# Patient Record
Sex: MALE | Race: WHITE | NOT HISPANIC OR LATINO | Employment: OTHER | URBAN - METROPOLITAN AREA
[De-identification: names, ages, dates, MRNs, and addresses within clinical notes are randomized per-mention and may not be internally consistent; named-entity substitution may affect disease eponyms.]

---

## 2021-08-03 ENCOUNTER — HOSPITAL ENCOUNTER (INPATIENT)
Facility: CLINIC | Age: 71
LOS: 1 days | Discharge: ANOTHER HOSPITAL | DRG: 540 | End: 2021-08-05
Attending: EMERGENCY MEDICINE | Admitting: INTERNAL MEDICINE
Payer: MEDICARE

## 2021-08-03 ENCOUNTER — APPOINTMENT (OUTPATIENT)
Dept: GENERAL RADIOLOGY | Facility: CLINIC | Age: 71
DRG: 540 | End: 2021-08-03
Attending: EMERGENCY MEDICINE
Payer: MEDICARE

## 2021-08-03 ENCOUNTER — OFFICE VISIT (OUTPATIENT)
Dept: URGENT CARE | Facility: URGENT CARE | Age: 71
End: 2021-08-03
Payer: MEDICARE

## 2021-08-03 VITALS — SYSTOLIC BLOOD PRESSURE: 131 MMHG | DIASTOLIC BLOOD PRESSURE: 71 MMHG | TEMPERATURE: 99.4 F | HEART RATE: 81 BPM

## 2021-08-03 DIAGNOSIS — L03.116 CELLULITIS OF LEFT LOWER EXTREMITY: ICD-10-CM

## 2021-08-03 DIAGNOSIS — L03.116 CELLULITIS OF LEFT ANKLE: ICD-10-CM

## 2021-08-03 DIAGNOSIS — E11.69 TYPE 2 DIABETES MELLITUS WITH OTHER SPECIFIED COMPLICATION, WITHOUT LONG-TERM CURRENT USE OF INSULIN (H): ICD-10-CM

## 2021-08-03 DIAGNOSIS — M79.89 LEFT LEG SWELLING: Primary | ICD-10-CM

## 2021-08-03 DIAGNOSIS — Z87.821 HISTORY OF FOREIGN BODY IN EYE: ICD-10-CM

## 2021-08-03 LAB
ANION GAP SERPL CALCULATED.3IONS-SCNC: 7 MMOL/L (ref 3–14)
BASOPHILS # BLD AUTO: 0 10E3/UL (ref 0–0.2)
BASOPHILS NFR BLD AUTO: 0 %
BUN SERPL-MCNC: 27 MG/DL (ref 7–30)
CALCIUM SERPL-MCNC: 8.8 MG/DL (ref 8.5–10.1)
CHLORIDE BLD-SCNC: 104 MMOL/L (ref 94–109)
CO2 SERPL-SCNC: 25 MMOL/L (ref 20–32)
CREAT SERPL-MCNC: 1.16 MG/DL (ref 0.66–1.25)
EOSINOPHIL # BLD AUTO: 0.1 10E3/UL (ref 0–0.7)
EOSINOPHIL NFR BLD AUTO: 1 %
ERYTHROCYTE [DISTWIDTH] IN BLOOD BY AUTOMATED COUNT: 13.4 % (ref 10–15)
GFR SERPL CREATININE-BSD FRML MDRD: 63 ML/MIN/1.73M2
GLUCOSE BLD-MCNC: 182 MG/DL (ref 70–99)
HCT VFR BLD AUTO: 39.9 % (ref 40–53)
HGB BLD-MCNC: 13.1 G/DL (ref 13.3–17.7)
HOLD SPECIMEN: NORMAL
IMM GRANULOCYTES # BLD: 0 10E3/UL
IMM GRANULOCYTES NFR BLD: 0 %
LACTATE SERPL-SCNC: 1 MMOL/L (ref 0.7–2)
LYMPHOCYTES # BLD AUTO: 1.7 10E3/UL (ref 0.8–5.3)
LYMPHOCYTES NFR BLD AUTO: 13 %
MCH RBC QN AUTO: 30.1 PG (ref 26.5–33)
MCHC RBC AUTO-ENTMCNC: 32.8 G/DL (ref 31.5–36.5)
MCV RBC AUTO: 92 FL (ref 78–100)
MONOCYTES # BLD AUTO: 0.8 10E3/UL (ref 0–1.3)
MONOCYTES NFR BLD AUTO: 7 %
NEUTROPHILS # BLD AUTO: 10.2 10E3/UL (ref 1.6–8.3)
NEUTROPHILS NFR BLD AUTO: 79 %
NRBC # BLD AUTO: 0 10E3/UL
NRBC BLD AUTO-RTO: 0 /100
PLATELET # BLD AUTO: 209 10E3/UL (ref 150–450)
POTASSIUM BLD-SCNC: 3.9 MMOL/L (ref 3.4–5.3)
RBC # BLD AUTO: 4.35 10E6/UL (ref 4.4–5.9)
SODIUM SERPL-SCNC: 136 MMOL/L (ref 133–144)
WBC # BLD AUTO: 12.9 10E3/UL (ref 4–11)

## 2021-08-03 PROCEDURE — 99285 EMERGENCY DEPT VISIT HI MDM: CPT

## 2021-08-03 PROCEDURE — 250N000011 HC RX IP 250 OP 636: Performed by: EMERGENCY MEDICINE

## 2021-08-03 PROCEDURE — 73610 X-RAY EXAM OF ANKLE: CPT | Mod: LT

## 2021-08-03 PROCEDURE — 80048 BASIC METABOLIC PNL TOTAL CA: CPT | Performed by: EMERGENCY MEDICINE

## 2021-08-03 PROCEDURE — 36415 COLL VENOUS BLD VENIPUNCTURE: CPT | Performed by: EMERGENCY MEDICINE

## 2021-08-03 PROCEDURE — C9803 HOPD COVID-19 SPEC COLLECT: HCPCS

## 2021-08-03 PROCEDURE — 99223 1ST HOSP IP/OBS HIGH 75: CPT | Mod: AI | Performed by: INTERNAL MEDICINE

## 2021-08-03 PROCEDURE — 87040 BLOOD CULTURE FOR BACTERIA: CPT | Performed by: EMERGENCY MEDICINE

## 2021-08-03 PROCEDURE — 99204 OFFICE O/P NEW MOD 45 MIN: CPT | Performed by: FAMILY MEDICINE

## 2021-08-03 PROCEDURE — 85025 COMPLETE CBC W/AUTO DIFF WBC: CPT | Performed by: EMERGENCY MEDICINE

## 2021-08-03 PROCEDURE — 83605 ASSAY OF LACTIC ACID: CPT | Performed by: EMERGENCY MEDICINE

## 2021-08-03 PROCEDURE — 96365 THER/PROPH/DIAG IV INF INIT: CPT

## 2021-08-03 PROCEDURE — 87635 SARS-COV-2 COVID-19 AMP PRB: CPT | Performed by: EMERGENCY MEDICINE

## 2021-08-03 RX ADMIN — TAZOBACTAM SODIUM AND PIPERACILLIN SODIUM 4.5 G: 500; 4 INJECTION, SOLUTION INTRAVENOUS at 23:38

## 2021-08-03 ASSESSMENT — ENCOUNTER SYMPTOMS
COLOR CHANGE: 1
WOUND: 1
FEVER: 0

## 2021-08-03 ASSESSMENT — MIFFLIN-ST. JEOR: SCORE: 1754.38

## 2021-08-03 NOTE — ED TRIAGE NOTES
Presents with LLE wounds of various stages of healing, redness and swelling. Pt has been remodeling a house and has several new wounds from the project as well as several diabetic ulcers to LLE. Pt on metformin. LLE is malodorous. VSS on RA.

## 2021-08-04 ENCOUNTER — APPOINTMENT (OUTPATIENT)
Dept: ULTRASOUND IMAGING | Facility: CLINIC | Age: 71
DRG: 540 | End: 2021-08-04
Attending: PODIATRIST
Payer: MEDICARE

## 2021-08-04 ENCOUNTER — APPOINTMENT (OUTPATIENT)
Dept: GENERAL RADIOLOGY | Facility: CLINIC | Age: 71
DRG: 540 | End: 2021-08-04
Attending: PODIATRIST
Payer: MEDICARE

## 2021-08-04 ENCOUNTER — APPOINTMENT (OUTPATIENT)
Dept: MRI IMAGING | Facility: CLINIC | Age: 71
DRG: 540 | End: 2021-08-04
Attending: PODIATRIST
Payer: MEDICARE

## 2021-08-04 PROBLEM — L03.116 CELLULITIS OF LEFT ANKLE: Status: ACTIVE | Noted: 2021-08-04

## 2021-08-04 LAB
CRP SERPL-MCNC: 122 MG/L (ref 0–8)
ERYTHROCYTE [SEDIMENTATION RATE] IN BLOOD BY WESTERGREN METHOD: 14 MM/HR (ref 0–20)
GLUCOSE BLDC GLUCOMTR-MCNC: 163 MG/DL (ref 70–99)
GLUCOSE BLDC GLUCOMTR-MCNC: 175 MG/DL (ref 70–99)
GLUCOSE BLDC GLUCOMTR-MCNC: 188 MG/DL (ref 70–99)
GLUCOSE BLDC GLUCOMTR-MCNC: 237 MG/DL (ref 70–99)
HBA1C MFR BLD: 7.6 % (ref 0–5.6)
SARS-COV-2 RNA RESP QL NAA+PROBE: NEGATIVE

## 2021-08-04 PROCEDURE — 250N000011 HC RX IP 250 OP 636: Performed by: INTERNAL MEDICINE

## 2021-08-04 PROCEDURE — 36415 COLL VENOUS BLD VENIPUNCTURE: CPT | Performed by: PODIATRIST

## 2021-08-04 PROCEDURE — 255N000002 HC RX 255 OP 636: Performed by: INTERNAL MEDICINE

## 2021-08-04 PROCEDURE — 83036 HEMOGLOBIN GLYCOSYLATED A1C: CPT | Performed by: INTERNAL MEDICINE

## 2021-08-04 PROCEDURE — 93922 UPR/L XTREMITY ART 2 LEVELS: CPT

## 2021-08-04 PROCEDURE — 99232 SBSQ HOSP IP/OBS MODERATE 35: CPT | Performed by: PHYSICIAN ASSISTANT

## 2021-08-04 PROCEDURE — 36415 COLL VENOUS BLD VENIPUNCTURE: CPT | Performed by: INTERNAL MEDICINE

## 2021-08-04 PROCEDURE — 86140 C-REACTIVE PROTEIN: CPT | Performed by: PODIATRIST

## 2021-08-04 PROCEDURE — G1004 CDSM NDSC: HCPCS | Performed by: RADIOLOGY

## 2021-08-04 PROCEDURE — 258N000003 HC RX IP 258 OP 636: Performed by: INTERNAL MEDICINE

## 2021-08-04 PROCEDURE — A9585 GADOBUTROL INJECTION: HCPCS | Performed by: INTERNAL MEDICINE

## 2021-08-04 PROCEDURE — 120N000004 HC R&B MS OVERFLOW

## 2021-08-04 PROCEDURE — 73720 MRI LWR EXTREMITY W/O&W/DYE: CPT | Mod: 26 | Performed by: RADIOLOGY

## 2021-08-04 PROCEDURE — 73720 MRI LWR EXTREMITY W/O&W/DYE: CPT | Mod: LT

## 2021-08-04 PROCEDURE — 70030 X-RAY EYE FOR FOREIGN BODY: CPT

## 2021-08-04 PROCEDURE — 73723 MRI JOINT LWR EXTR W/O&W/DYE: CPT | Mod: LT

## 2021-08-04 PROCEDURE — 250N000012 HC RX MED GY IP 250 OP 636 PS 637: Performed by: INTERNAL MEDICINE

## 2021-08-04 PROCEDURE — 99222 1ST HOSP IP/OBS MODERATE 55: CPT | Performed by: PODIATRIST

## 2021-08-04 PROCEDURE — 73723 MRI JOINT LWR EXTR W/O&W/DYE: CPT | Mod: 26 | Performed by: RADIOLOGY

## 2021-08-04 PROCEDURE — 85652 RBC SED RATE AUTOMATED: CPT | Performed by: PODIATRIST

## 2021-08-04 RX ORDER — DEXTROSE MONOHYDRATE 25 G/50ML
25-50 INJECTION, SOLUTION INTRAVENOUS
Status: DISCONTINUED | OUTPATIENT
Start: 2021-08-04 | End: 2021-08-05 | Stop reason: HOSPADM

## 2021-08-04 RX ORDER — DEXTROSE MONOHYDRATE 25 G/50ML
25-50 INJECTION, SOLUTION INTRAVENOUS
Status: DISCONTINUED | OUTPATIENT
Start: 2021-08-04 | End: 2021-08-05

## 2021-08-04 RX ORDER — LIDOCAINE 40 MG/G
CREAM TOPICAL
Status: DISCONTINUED | OUTPATIENT
Start: 2021-08-04 | End: 2021-08-05 | Stop reason: HOSPADM

## 2021-08-04 RX ORDER — CEFAZOLIN SODIUM 1 G/50ML
1750 SOLUTION INTRAVENOUS ONCE
Status: COMPLETED | OUTPATIENT
Start: 2021-08-04 | End: 2021-08-04

## 2021-08-04 RX ORDER — ACETAMINOPHEN 650 MG/1
650 SUPPOSITORY RECTAL EVERY 6 HOURS PRN
Status: DISCONTINUED | OUTPATIENT
Start: 2021-08-04 | End: 2021-08-05 | Stop reason: HOSPADM

## 2021-08-04 RX ORDER — SODIUM CHLORIDE 9 MG/ML
INJECTION, SOLUTION INTRAVENOUS CONTINUOUS
Status: DISCONTINUED | OUTPATIENT
Start: 2021-08-04 | End: 2021-08-05

## 2021-08-04 RX ORDER — ACETAMINOPHEN 325 MG/1
650 TABLET ORAL EVERY 6 HOURS PRN
Status: DISCONTINUED | OUTPATIENT
Start: 2021-08-04 | End: 2021-08-05 | Stop reason: HOSPADM

## 2021-08-04 RX ORDER — HYDROMORPHONE HCL IN WATER/PF 6 MG/30 ML
0.2 PATIENT CONTROLLED ANALGESIA SYRINGE INTRAVENOUS
Status: DISCONTINUED | OUTPATIENT
Start: 2021-08-04 | End: 2021-08-05 | Stop reason: HOSPADM

## 2021-08-04 RX ORDER — NALOXONE HYDROCHLORIDE 0.4 MG/ML
0.2 INJECTION, SOLUTION INTRAMUSCULAR; INTRAVENOUS; SUBCUTANEOUS
Status: DISCONTINUED | OUTPATIENT
Start: 2021-08-04 | End: 2021-08-05 | Stop reason: HOSPADM

## 2021-08-04 RX ORDER — NICOTINE POLACRILEX 4 MG
15-30 LOZENGE BUCCAL
Status: DISCONTINUED | OUTPATIENT
Start: 2021-08-04 | End: 2021-08-05

## 2021-08-04 RX ORDER — NALOXONE HYDROCHLORIDE 0.4 MG/ML
0.4 INJECTION, SOLUTION INTRAMUSCULAR; INTRAVENOUS; SUBCUTANEOUS
Status: DISCONTINUED | OUTPATIENT
Start: 2021-08-04 | End: 2021-08-05 | Stop reason: HOSPADM

## 2021-08-04 RX ORDER — CEFAZOLIN SODIUM 1 G/50ML
1750 SOLUTION INTRAVENOUS EVERY 24 HOURS
Status: DISCONTINUED | OUTPATIENT
Start: 2021-08-04 | End: 2021-08-05 | Stop reason: HOSPADM

## 2021-08-04 RX ORDER — OXYCODONE HYDROCHLORIDE 5 MG/1
5 TABLET ORAL EVERY 4 HOURS PRN
Status: DISCONTINUED | OUTPATIENT
Start: 2021-08-04 | End: 2021-08-05 | Stop reason: HOSPADM

## 2021-08-04 RX ORDER — NICOTINE POLACRILEX 4 MG
15-30 LOZENGE BUCCAL
Status: DISCONTINUED | OUTPATIENT
Start: 2021-08-04 | End: 2021-08-05 | Stop reason: HOSPADM

## 2021-08-04 RX ORDER — GADOBUTROL 604.72 MG/ML
10 INJECTION INTRAVENOUS ONCE
Status: COMPLETED | OUTPATIENT
Start: 2021-08-04 | End: 2021-08-04

## 2021-08-04 RX ADMIN — VANCOMYCIN HYDROCHLORIDE 1750 MG: 10 INJECTION, POWDER, LYOPHILIZED, FOR SOLUTION INTRAVENOUS at 03:23

## 2021-08-04 RX ADMIN — TAZOBACTAM SODIUM AND PIPERACILLIN SODIUM 4.5 G: 500; 4 INJECTION, SOLUTION INTRAVENOUS at 18:45

## 2021-08-04 RX ADMIN — VANCOMYCIN HYDROCHLORIDE 1750 MG: 1 INJECTION, POWDER, LYOPHILIZED, FOR SOLUTION INTRAVENOUS at 22:01

## 2021-08-04 RX ADMIN — TAZOBACTAM SODIUM AND PIPERACILLIN SODIUM 4.5 G: 500; 4 INJECTION, SOLUTION INTRAVENOUS at 06:39

## 2021-08-04 RX ADMIN — TAZOBACTAM SODIUM AND PIPERACILLIN SODIUM 4.5 G: 500; 4 INJECTION, SOLUTION INTRAVENOUS at 13:13

## 2021-08-04 RX ADMIN — SODIUM CHLORIDE, PRESERVATIVE FREE: 5 INJECTION INTRAVENOUS at 02:04

## 2021-08-04 RX ADMIN — INSULIN ASPART 1 UNITS: 100 INJECTION, SOLUTION INTRAVENOUS; SUBCUTANEOUS at 05:28

## 2021-08-04 RX ADMIN — SODIUM CHLORIDE, PRESERVATIVE FREE 100 ML/HR: 5 INJECTION INTRAVENOUS at 21:19

## 2021-08-04 RX ADMIN — GADOBUTROL 10 ML: 604.72 INJECTION INTRAVENOUS at 15:22

## 2021-08-04 RX ADMIN — INSULIN ASPART 2 UNITS: 100 INJECTION, SOLUTION INTRAVENOUS; SUBCUTANEOUS at 02:33

## 2021-08-04 ASSESSMENT — ACTIVITIES OF DAILY LIVING (ADL)
TOILETING_ISSUES: NO
PATIENT_/_FAMILY_COMMUNICATION_STYLE: SPOKEN LANGUAGE (ENGLISH OR BILINGUAL)
DIFFICULTY_COMMUNICATING: NO
NUMBER_OF_TIMES_PATIENT_HAS_FALLEN_WITHIN_LAST_SIX_MONTHS: 1
DRESSING/BATHING_DIFFICULTY: NO
DIFFICULTY_EATING/SWALLOWING: NO
WALKING_OR_CLIMBING_STAIRS_DIFFICULTY: NO
CONCENTRATING,_REMEMBERING_OR_MAKING_DECISIONS_DIFFICULTY: NO
FALL_HISTORY_WITHIN_LAST_SIX_MONTHS: YES
DOING_ERRANDS_INDEPENDENTLY_DIFFICULTY: NO
HEARING_DIFFICULTY_OR_DEAF: NO
WEAR_GLASSES_OR_BLIND: YES
VISION_MANAGEMENT: GLASSES

## 2021-08-04 ASSESSMENT — MIFFLIN-ST. JEOR: SCORE: 1769.15

## 2021-08-04 NOTE — ED PROVIDER NOTES
"  History   Chief Complaint:  Wound Check     HPI   Rangel Yang is a 71 year old male with history of type II diabetes and peripheral neuropathy requiring left fifth toe amputation who presents for a wound check. Recently the patient has been remodeling a house and fell off a ladder a week ago sustaining some wounds to his left ankle and left lower leg. Today he started to develop increasing pain, redness, and swelling to the left leg around these wounds, prompting him to come into the ED with primary concern that he could have a skin infection. He has not had any measured fever.     Review of Systems   Constitutional: Negative for fever.   Skin: Positive for color change (redness, left lower leg) and wound.   All other systems reviewed and are negative.      Allergies:  No known drug allergies      Medications:  Aspirin   Metformin     Past Medical History:    Hyperlipidemia  Peripheral neuropathy   Diabetes mellitus, type II      Past Surgical History:    Tonsillectomy and adenoidectomy  Open left fifth toe amputation   Full thickness subcutaneous debridement of left open 5th ray amputation site   Completion 5th ray metatarsal amputation with wound closure and full thickness subcutaneous debridement  Varicose vein surgery of left lower extremity      Family History:    CAD - Father   Diabetes - Mother     Social History:  The patient presents to the ED alone.     Physical Exam     Patient Vitals for the past 24 hrs:   BP Temp Temp src Pulse Resp SpO2 Height Weight   08/03/21 2330 (!) 148/79 -- -- 81 -- 100 % -- --   08/03/21 2305 (!) 142/68 -- -- 80 -- 96 % -- --   08/03/21 1853 (!) 136/97 97.7  F (36.5  C) Temporal 78 19 100 % 1.753 m (5' 9\") 100.4 kg (221 lb 5.5 oz)       Physical Exam  Nursing note and vitals reviewed.  Constitutional: Cooperative.   HENT:   Mouth/Throat: Moist mucous membranes.   Eyes: EOMI, nonicteric sclera  Cardiovascular: Normal rate, regular rhythm, no murmurs, rubs, or gallops.  " Normal DP pulses.  Pulmonary/Chest: Effort normal and breath sounds normal. No respiratory distress. No wheezes. No rales.   Abdominal: Soft. Nontender, nondistended, no guarding or rigidity.   Musculoskeletal: Normal range of motion.   Neurological: Alert. Moves all extremities spontaneously.   Skin: Skin is warm and dry. No rash noted.  Skin ulcer noted to left lateral malleolus.  Psychiatric: Normal mood and affect.      Emergency Department Course     Imaging:  XR Ankle Left:  IMPRESSION: Bimalleolar soft tissue swelling. Soft tissue defect over lateral malleolus, may reflect ulcer provided history. No radiographic evidence of osteomyelitis. Atherosclerotic calcifications.    Laboratory:   CBC: WBC 12.9 high, HGB 13.1 low,    BMP: Glucose 182 high, o/w WNL (Creatinine 1.16)   Lactic acid whole blood: 1.0   Asymptomatic COVID19 Virus PCR by nasopharyngeal swab: Negative       Emergency Department Course:  Reviewed:  I reviewed nursing notes, vitals and past medical history    Assessments:  2310: I obtained history and examined the patient as noted above.     Consults:   1213: I spoke with Dr. Watters of the hospitalist service regarding patient's presentation, findings, and plan of care.     Interventions:  2338 Zosyn 4.5 g IV     Disposition:  The patient was admitted to the hospital under the care of Dr. Watters.       Impression & Plan     Medical Decision Making:  Rangel Yang is a 71 year old male who presents for evaluation of skin redness.  The history, physical exam and supporting data are consistent with cellulitis.  There do not appear at this time to be any complication of cellulitis including necrotizing fascitis, osteomyelitis, sepsis, or shock.  I do not believe there is an abscess amenable to incision and drainage.  Based on history of diabetes, would admit to medicine for further cares and IV antibiotics.  Stable for admission. Dr. Watters accepts for admission.      Covid-19  Rangel HERRERA  Trey was evaluated during a global COVID-19 pandemic, which necessitated consideration that the patient might be at risk for infection with the SARS-CoV-2 virus that causes COVID-19.   Applicable protocols for evaluation were followed during the patient's care.   COVID-19 was considered as part of the patient's evaluation. The plan for testing is:  a test was obtained during this visit.     Diagnosis:    ICD-10-CM    1. Cellulitis of left ankle  L03.116         Scribe Disclosure:  I, Matthieu Agrawal, am serving as a scribe at 11:10 PM on 8/3/2021 to document services personally performed by Carlos Pinon MD based on my observations and the provider's statements to me.         Carlos Pinon MD  08/04/21 0964

## 2021-08-04 NOTE — ED NOTES
"[facility]  ED Nurse Handoff Report    Rangel Yang is a 71 year old male   ED Chief complaint: Wound Check and Wound Infection  . ED Diagnosis:   Final diagnoses:   Cellulitis of left ankle     Allergies: No Known Allergies    Code Status: Full Code  Activity level - Baseline/Home:  Stand by Assist. Activity Level - Current:   Stand by Assist. Lift room needed: No. Bariatric: No   Needed: No   Isolation: No. Infection: Not Applicable.     Vital Signs:   Vitals:    08/03/21 1853 08/03/21 2305   BP: (!) 136/97 (!) 142/68   Pulse: 78 80   Resp: 19    Temp: 97.7  F (36.5  C)    TempSrc: Temporal    SpO2: 100% 96%   Weight: 100.4 kg (221 lb 5.5 oz)    Height: 1.753 m (5' 9\")        Cardiac Rhythm:  ,      Pain level:    Patient confused: No. Patient Falls Risk: Yes.   Elimination Status: Has voided   Patient Report - Initial Complaint: wound to left lateral malleolus. Focused Assessment:   Skin Color/Condition Skin - Skin WDL: .WDL except  Skin Color: redness blanchable  Skin Integrity/Characteristics: scab  Skin Comment: numerous ulcers on left foot. Quarter sized wound on malleolus. Reports pt hit his ankle on a ladder. Pt reports this wound happened yesterday, but has worsened today. Malodorous wound on ankle. Redness on back of left calf, pt reports a shovel broke and knocked him in the leg. Few scattered scabs located on shin of right leg. Pt able to walk.         Tests Performed: labs, imaging. Abnormal Results:   Labs Ordered and Resulted from Time of ED Arrival Up to the Time of Departure from the ED   BASIC METABOLIC PANEL - Abnormal; Notable for the following components:       Result Value    Glucose 182 (*)     All other components within normal limits   CBC WITH PLATELETS AND DIFFERENTIAL - Abnormal; Notable for the following components:    WBC Count 12.9 (*)     RBC Count 4.35 (*)     Hemoglobin 13.1 (*)     Hematocrit 39.9 (*)     Absolute Neutrophils 10.2 (*)     All other " components within normal limits   LACTIC ACID WHOLE BLOOD - Normal   EXTRA BLUE TOP TUBE   EXTRA RED TOP TUBE   EXTRA GREEN TOP (LITHIUM HEPARIN) TUBE   EXTRA PURPLE TOP TUBE   EXTRA GREEN TOP (LITHIUM HEPARIN) TUBE   PERIPHERAL IV CATHETER   BLOOD CULTURE   BLOOD CULTURE   COVID-19 VIRUS (CORONAVIRUS) BY PCR   CBC WITH PLATELETS & DIFFERENTIAL    Narrative:     The following orders were created for panel order CBC + differential.  Procedure                               Abnormality         Status                     ---------                               -----------         ------                     CBC with platelets and d...[[id number]]  Abnormal            Final result                 Please view results for these tests on the individual orders.   EXTRA TUBE    Narrative:     The following orders were created for panel order Extra Tube (Maynard Draw).  Procedure                               Abnormality         Status                     ---------                               -----------         ------                     Extra Blue Top Tube[[id number]]                              Final result               Extra Red Top Tube[[id number]]                               Final result               Extra Green Top (Lithium...[[id number]]                      Final result               Extra Green Top (Lithium...[[id number]]                                                 Extra Purple Top Tube[[id number]]                            Final result                 Please view results for these tests on the individual orders.     XR Ankle Left G/E 3 Views    (Results Pending)     .   Treatments provided: zosyn  Family Comments: N/A  OBS brochure/video discussed/provided to patient:  Yes  ED Medications:   Medications   piperacillin-tazobactam (ZOSYN) intermittent infusion 4.5 g (4.5 g Intravenous New Bag 8/3/21 5046)     Drips infusing:  No  For the majority of the shift, the patient's behavior Green. Interventions performed  were N/A.    Sepsis treatment initiated: No     Patient tested for COVID 19 prior to admission: YES    ED Nurse Name/Phone Number: Hailey Landa RN,   11:50 PM    RECEIVING UNIT ED HANDOFF REVIEW    Above ED Nurse Handoff Report was reviewed: Yes  Reviewed by: Zakia Hunt RN on August 4, 2021 at 1:17 AM

## 2021-08-04 NOTE — CONSULTS
PATIENT HISTORY:  Rangel Yang is a 71 year old male who was admitted for left leg infection.      I was asked to see Rangel Yang  by  for ulcer left ankle.    Patient seen at bedside.  States he is from [place]. Just down in [place] getting his parents house ready for sale. He states he fell off a ladder a week ago. He had multiple cuts on his left leg and notes the one on the ankle was the only one that did not heal. He has been putting bacitracin on it. Noticed redness 2-3 days ago and came in today as it was worsening.     Review of Systems:  Patient denies fever, chills, rash, wound, stiffness, limping, weakness, heart burn, blood in stool, chest pain with activity, calf pain when walking, shortness of breath with activity, chronic cough, easy bleeding/bruising, swelling of ankles, excessive thirst, fatigue, depression, anxiety.  Patient admits to numbness.     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    resting bedside ABIs of 0.9 bilaterally; ocurred in association with  infection after agressive toenail cutting which the pt did himself     Hyperlipidemia LDL goal < 70 4-2010    tg > 700 at dx     Peripheral neuropathy     secondary to previously unrecognized DM2     Type II or unspecified type diabetes mellitus without mention of complication, uncontrolled 4-2010    very angry about his dx        PAST SURGICAL HISTORY:   Past Surgical History:   Procedure Laterality Date     TONSILLECTOMY & ADENOIDECTOMY       [facility] NONSPECIFIC PROCEDURE  4-    Open left 5th toe amputation, Dr. Hussein Engle     [facility] NONSPECIFIC PROCEDURE  4-    Full thickness subcutaneous debridement of left open 5th ray amputation site 04/19/2010, Dr. Hussein Engle.     [facility] NONSPECIFIC PROCEDURE  4-    Completion  5th ray metatarsal amputation with wound closure and full thickness subcutaneous debridement, Dr. Hussein Engle 04/21/2010.     [facility] NONSPECIFIC PROCEDURE  2008    varicose vein surgery of the left lower extremity.        MEDICATIONS:   Current Facility-Administered Medications:      acetaminophen (TYLENOL) tablet 650 mg, 650 mg, Oral, Q6H PRN **OR** acetaminophen (TYLENOL) Suppository 650 mg, 650 mg, Rectal, Q6H PRN, Jersey Watters MD     glucose gel 15-30 g, 15-30 g, Oral, Q15 Min PRN **OR** dextrose 50 % injection 25-50 mL, 25-50 mL, Intravenous, Q15 Min PRN **OR** glucagon injection 1 mg, 1 mg, Subcutaneous, Q15 Min PRN, Jersey Watters MD     HYDROmorphone (DILAUDID) injection 0.2 mg, 0.2 mg, Intravenous, Q2H PRN, Jersey Watters MD     insulin aspart (NovoLOG) injection (RAPID ACTING), 1-6 Units, Subcutaneous, Q4H, Jersey Watters MD, 1 Units at 08/04/21 0528     lidocaine (LMX4) cream, , Topical, Q1H PRN, Jersey Watters MD     lidocaine 1 % 0.1-1 mL, 0.1-1 mL, Other, Q1H PRN, Jersey Watters MD     melatonin tablet 1 mg, 1 mg, Oral, At Bedtime PRN, Jersey Watters MD     naloxone (NARCAN) injection 0.2 mg, 0.2 mg, Intravenous, Q2 Min PRN **OR** naloxone (NARCAN) injection 0.4 mg, 0.4 mg, Intravenous, Q2 Min PRN **OR** naloxone (NARCAN) injection 0.2 mg, 0.2 mg, Intramuscular, Q2 Min PRN **OR** naloxone (NARCAN) injection 0.4 mg, 0.4 mg, Intramuscular, Q2 Min PRN, Jersey Watters MD     oxyCODONE (ROXICODONE) tablet 5 mg, 5 mg, Oral, Q4H PRN, Jersey Watters MD     piperacillin-tazobactam (ZOSYN) intermittent infusion 4.5 g, 4.5 g, Intravenous, Q6H, Jersey Watters MD, Last Rate: 200 mL/hr at 08/04/21 0639, 4.5 g at 08/04/21 0639     sodium chloride (PF) 0.9% PF flush 3 mL, 3 mL, Intracatheter, Q8H, Jersey Watters MD, 3 mL at 08/04/21 0205     sodium chloride (PF) 0.9% PF flush 3 mL, 3 mL, Intracatheter, q1 min prn, Jersey Watters MD     sodium chloride 0.9% infusion, ,  Intravenous, Continuous, Jersey Watters MD, Last Rate: 100 mL/hr at 08/04/21 0204, New Bag at 08/04/21 0204     vancomycin (VANCOCIN) 1,750 mg in sodium chloride 0.9 % 500 mL intermittent infusion, 1,750 mg, Intravenous, Q24H, Jersey Watters MD     ALLERGIES:  No Known Allergies     SOCIAL HISTORY:   Social History     Socioeconomic History     Marital status:      Spouse name:  three times      Number of children: 2     Years of education: Not on file     Highest education level: Not on file   Occupational History     Employer: [employer]UDENT     Comment: former [employer]    Tobacco Use     Smoking status: Former Smoker   Substance and Sexual Activity     Alcohol use: Yes     Comment: occ     Drug use: No     Sexual activity: Yes     Partners: Female   Other Topics Concern     Parent/sibling w/ CABG, MI or angioplasty before 65F 55M? Not Asked   Social History Narrative     Not on file     Social Determinants of Health     Financial Resource Strain:      Difficulty of Paying Living Expenses:    Food Insecurity:      Worried About Running Out of Food in the Last Year:      Ran Out of Food in the Last Year:    Transportation Needs:      Lack of Transportation (Medical):      Lack of Transportation (Non-Medical):    Physical Activity:      Days of Exercise per Week:      Minutes of Exercise per Session:    Stress:      Feeling of Stress :    Social Connections:      Frequency of Communication with Friends and Family:      Frequency of Social Gatherings with Friends and Family:      Attends [employer] Services:      Active Member of Clubs or Organizations:      Attends Club or Organization Meetings:      Marital Status:    Intimate Partner Violence:      Fear of Current or Ex-Partner:      Emotionally Abused:      Physically Abused:      Sexually Abused:         FAMILY HISTORY:   Family History   Problem Relation Age of Onset     C.A.IVELISSE. Father         Father with coronary artery disease in his 40s.      "Diabetes Mother         EXAM:Vitals: BP (!) 141/69 (BP Location: Right arm)   Pulse 71   Temp 97.9  F (36.6  C) (Oral)   Resp 18   Ht 1.778 m (5' 10\")   Wt 100.3 kg (221 lb 1.6 oz)   SpO2 99%   BMI 31.72 kg/m    BMI= Body mass index is 31.72 kg/m .    LABS:    WBC   Date Value Ref Range Status   04/22/2010 12.7 (H) 4.0 - 11.0 10e9/L Final     WBC Count   Date Value Ref Range Status   08/03/2021 12.9 (H) 4.0 - 11.0 10e3/uL Final     HA1C: 7.6    C-reactive protein:  122  ESR: --: 14    General appearance: Patient is alert and fully cooperative with history & exam.  No sign of distress is noted during the visit.      Psychiatric: Affect is pleasant & appropriate.  Patient appears motivated to improve health.       Respiratory: Breathing is regular & unlabored while sitting.      HEENT: Hearing is intact to spoken word.  Speech is clear.  No gross evidence of visual impairment that would impact ambulation.       Dermatologic:  Full thickness ulceration to the lateral left ankle over distal fibula. Measures apprpoximately 1.4cm x 1.5cm x 0.4cm. does not probe to bone but base of wound is fibrous. Subcutaneous tissue expposed. Lightly malodorous. No purulent drainage noted. Redness up left leg is somewhat resolving from lines previously drawn.      Vascular: DP & PT pulses are not palpable bilaterally.  No significant edema or varicosities noted.  CFT and skin temperature is normal to both lower extremities.    Neurologic: Lower extremity sensation is diminished to feet.      Musculoskeletal: Patient is ambulatory without assistive device or brace. Previously left 5th toe amputation.     IMAGING: left ankle xray - I personally reviewed images - Bimalleolar soft tissue swelling. Soft tissue defect over lateral malleolus, may reflect ulcer provided history. No radiographic evidence of osteomyelitis. Atherosclerotic calcifications.    MRI left ankle and foot:   Pending    MIYA:  Waveforms: Triphasic throughout the " "right lower extremity. Biphasic in  the left femoral and popliteal region. Monophasic in the distal left  tibial arteries. Dampened digital waveforms in the left foot.                                                                      IMPRESSION:  1. Ankle-brachial index in the right lower extremity indicate moderate  arterial insufficiency.  2. Ankle-brachial index in the left lower extremity indicates moderate  to severe arterial insufficiency. Left digital brachial index would  indicate severe arterial insufficiency in the left foot.     ASSESSMENT: 71 yr old diabetic male with PAD, ulcer left ankle, cellulitis, r/o osteomyelitis and abscess.     PLAN:  Reviewed patient's chart in epic.  -dressing was changed at bedside.   -discussed xray results.   -at this time, I recommend MRI of the foot and ankle to assess for abscess or bone infection.   -MIYA\"s are poor to left foot and leg. If patient needs surgery, may need transfer to [facility] for vascular intervention before hand.   -we will follow up with MRI results tomorrow.       Kina Whitley DPM, Podiatry/Foot and Ankle Surgery    7:27 AM    "

## 2021-08-04 NOTE — PLAN OF CARE
Vitals are Temp: 97.9  F (36.6  C) Temp src: Oral BP: (!) 141/69 Pulse: 71   Resp: 18 SpO2: 99 %.  Patient admitted with cellulitis of L ankle. LLE red and edematous. X-ray showed soft tissue swelling, no osteomyelitis. Alert and Oriented x4. They are SBA with no assistive devices .  Pt is a NPO diet.  They are denying pain.  Patient has Normal Saline 0.9% running at 100 mL per hour. Q4 blood sugar checks. Plan: IV abx zosyn and vancomycin, podiatry consult. Continue to monitor.

## 2021-08-04 NOTE — PLAN OF CARE
PRIMARY DIAGNOSIS: SOFT TISSUE INFECTIONS  OUTPATIENT/OBSERVATION GOALS TO BE MET BEFORE DISCHARGE:  Vitals sign stable or return to baseline: Yes    Tolerating oral antibiotics or has home infusion set up if applicable: Yes    Pain status: Pain free.    Return to near baseline physical activity: Yes    Discharge Planner Nurse   Safe discharge environment identified: Yes  Barriers to discharge: Yes       Entered by: Aggie Chavira 08/04/2021 2:36 PM     Please review provider order for any additional goals.     Nurse to notify provider when observation goals have been met and patient is ready for discharge.    Pt is alert and oriented. Denies pain. IND in room. Seen by podiatry. They dressed the ankle wound, ordered MIYA and MRI, patient is at MRI now. Patient receiving Vanco and Zosyn. ID consulted. Pt is very eager to emil c.

## 2021-08-04 NOTE — PHARMACY-ADMISSION MEDICATION HISTORY
Admission medication history interview status for this patient is complete. See [facility] admission navigator for allergy information, prior to admission medications and immunization status.     Medication history interview done, indicate source(s): Patient  Medication history resources (including written lists, pill bottles, clinic record):None  Pharmacy: n/a    Changes made to PTA medication list:  Added: none  Changed: none  Reported as Not Taking: none  Removed: supplements/vitamins    Actions taken by pharmacist (provider contacted, etc):None     Additional medication history information:None    Medication reconciliation/reorder completed by provider prior to medication history?  Y   (Y/N)         Prior to Admission medications    Medication Sig Last Dose Taking? Auth Provider   metFORMIN (GLUCOPHAGE) 500 MG tablet Take 1,000 mg by mouth 2 times daily (with meals) 8/3/2021 at Unknown time Yes Reported, Patient

## 2021-08-04 NOTE — PROGRESS NOTES
SUBJECTIVE: Rangel Yang is a 71 year old male presenting with a chief complaint of left leg swelling and redness.  Onset of symptoms was day(s) ago.  Course of illness is worsening.    Predisposing factors include diabetes.    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    resting bedside ABIs of 0.9 bilaterally; ocurred in association with  infection after agressive toenail cutting which the pt did himself     Hyperlipidemia LDL goal < 70 4-2010    tg > 700 at dx     Peripheral neuropathy     secondary to previously unrecognized DM2     Type II or unspecified type diabetes mellitus without mention of complication, uncontrolled 4-2010    very angry about his dx     No Known Allergies  Social History     Tobacco Use     Smoking status: Former Smoker   Substance Use Topics     Alcohol use: Yes     Comment: occ       ROS:  SKIN: no rash  GI: no vomiting    OBJECTIVE:  /71   Pulse 81   Temp 99.4  F (37.4  C) (Tympanic) GENERAL APPEARANCE: healthy, alert and no distress  SKIN: leg ulcers and redness with left leg swelling      ICD-10-CM    1. Left leg swelling  M79.89 CANCELED: D dimer, quantitative   2. Cellulitis of left lower extremity  L03.116    3. Type 2 diabetes mellitus with other specified complication, without long-term current use of insulin (H)  E11.69      Pt will go through ED for cont w/u    
normal

## 2021-08-04 NOTE — PLAN OF CARE
"Primary Dx: Cellulitis of left ankle  Adm Date: 8/3/2021   Code Status: Full Code   Living Situation: Visiting family from [place]  Assessment:   BP (!) 169/83 (BP Location: Right arm)   Pulse 76   Temp 97.4  F (36.3  C) (Oral)   Resp 16   Ht 1.778 m (5' 10\")   Wt 100.3 kg (221 lb 1.6 oz)   SpO2 100%   BMI 31.72 kg/m    Neuro: AxOx4 and able to make needs known, GC 15, calm and cooperative.   HEENT:   Resp: Clear bilat, Denies SOB, Deep breathing/cough ind   CV: Hypertensive, HR 76, Denies chest pain   PNV: +1 edema LLE, pulses +2, CR < 3-sec. tingling in LLE reported \"from positioning during MRI\", denies numbness   GI: Non-distended, Last BM 8/3, Denies discomfort or difficulty.   : WDL   Skin: Wound L Ankle, Erythema and swelling L shin to ankle, Signs of phlebitis on medial calf R/T varicosities, reported \"got hit by a shovel\". No redness outside marked border.   MS: Strength WDL, Mildly limited mobility and joint soreness in L ankle. Ambulating SBA with no devices.   Pain: Denies   Safety: Calls appropriately, understands and cooperates with fall precautions   IV: NS @ 100, restarted following MRI 1621  Labs: MRI: showed osteitis with early osteomyelitis    Activity: SBA  Nutrition: Reg, tolerating well   Consults: Pod, ID  POC: IV Abx (zosyn/vanco), Elevate LLE, Consults, Result Blood Cx, Monitor BG.  Nrsg to continue providing supportive cares.    "

## 2021-08-04 NOTE — PLAN OF CARE
ROOM # 212    Living Situation (if not independent, order SW consult):  Facility name:  : Leonora Yang (daughter) [phone]    Activity level at baseline: Independent  Activity level on admit: SBA      Patient registered to observation; given Patient Bill of Rights; given the opportunity to ask questions about observation status and their plan of care.  Patient has been oriented to the observation room, bathroom and call light is in place.    Discussed discharge goals and expectations with patient/family.

## 2021-08-04 NOTE — H&P
Hospitalist Admission Note    Name: Rangel Yang    MRN: [id number]          YOB: 1950    Age: 71 year old  Date of admission: 8/3/2021  Primary care provider: Sandra Colmenares              Assessment:       Brief summary of admission assessment:    Rangel Yang is a 71 year old  male with a significant past medical history of type 2 diabetes with peripheral neuropathy and previous left fifth toe amputation who presents with  left lower extremity erythema, swelling and wound.  Patient was doing some remodeling work at home and he fell off a ladder a week ago sustaining skin injuries to his left ankle and left lower leg.  Patient was afebrile on arrival.  Complete blood count showed leukocytosis with left shift.  Blood sugar was 182.  Bimalleolar soft tissue swelling with no convincing evidence of osteomyelitis on x-ray noted.      Admission diagnoses:       #1.Leg wound and cellulitis             #2.  Type 2 diabetes on Metformin          Active comorbid medical conditions:    Hyperlipidemia    Peripheral neuropathy        COVID-19 Testing : Negative         Plan/MDM:       # Admission Status: Will admit patient to hospitalist service as inpatient as patient likely need over two mid night stay  in the hospital.     # Care plan:      Continue IV abx - zosyn , add vancomycin     Elevate affected limb    Podiatry consult    Obtain blood culture    Blood sugar control    # Supportive care:Pain management: acetominophen    # Diet:Diet advanced    # Activity:Advance activity as tolerated    # Education/Counseling :Discussed treatment plan with the patient    # Consults:Inpatient consult with podiatry    # VTE prophylactic measures:prophylaxis against venous thromboembolism with PCD     # Therapies:Wound care      # Additional orders:    --Care plan discussed with the patient/family and agreed to care plan   --Patient will be transferred to care of hospitalist attending for further  evaluation and management as appropriate   --Old medical orders reviewed   --imaging result independently reviewed by me     (See orders placed for this visit by me )     - Home medication reviewed and will be continued as appropriate once pharmacy reconciliation is completed         Code Status/Disposition:     # Code Status:Full Code      # Disposition:anticipate discharge to home and Anticipate discharge in 3 days        Disclaimer: This note consists of symbols derived from keyboarding, dictation and/or voice recognition software. As a result, there may be errors in the script that have gone undetected. Please consider this when interpreting information found in this chart.             Chief Complaint:       Left foot pain, wound, swelling and redness     History is obtained from the patient          History of Present Illness:      This patient is a 71 year old  male with a significant past medical history of type 2 diabetes with complications who presents with the following condition requiring a hospital admission:    Left lower extremity cellulitis and wound    Patient is 71-year-old male who came in with left foot redness, swelling and wound.  Patient sustained a fall about a week ago when he fell off a ladder with injury to his left ankle.  Following the injury, patient developed redness, swelling and pain which progressively got worse.  He has left ankle wound which is also worsening.  Patient was seen at urgent care and was referred to emergency department for evaluation.  Patient denies fever or chills.  No chest pain shortness of breath, nausea vomiting or diarrhea.  Patient had toe gangrene which was amputated in the past.         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    resting bedside ABIs of 0.9  bilaterally; ocurred in association with  infection after agressive toenail cutting which the pt did himself     Hyperlipidemia LDL goal < 70 4-2010    tg > 700 at dx     Peripheral neuropathy     secondary to previously unrecognized DM2     Type II or unspecified type diabetes mellitus without mention of complication, uncontrolled 4-2010    very angry about his dx            Past Surgical History:     Past Surgical History:   Procedure Laterality Date     TONSILLECTOMY & ADENOIDECTOMY       [facility] NONSPECIFIC PROCEDURE  4-    Open left 5th toe amputation, Dr. Hussein Engle     [facility] NONSPECIFIC PROCEDURE  4-    Full thickness subcutaneous debridement of left open 5th ray amputation site 04/19/2010, Dr. Hussein Engle.     [facility] NONSPECIFIC PROCEDURE  4-    Completion 5th ray metatarsal amputation with wound closure and full thickness subcutaneous debridement, Dr. Hussein Engle 04/21/2010.     [facility] NONSPECIFIC PROCEDURE  2008    varicose vein surgery of the left lower extremity.             Social History:     Social History     Tobacco Use     Smoking status: Former Smoker   Substance Use Topics     Alcohol use: Yes     Comment: occ             Family History:     Family History   Problem Relation Age of Onset     C.A.D. Father         Father with coronary artery disease in his 40s.     Diabetes Mother             Allergies:   No Known Allergies          Medications:        Prior to Admission medications    Medication Sig Last Dose Taking? Auth Provider   ASPIRIN 81 MG PO TABS ONE DAILY  Patient not taking: No sig reported   Terrell Manuel MD   cephALEXin (KEFLEX) 500 MG capsule Take 1 capsule (500 mg) by mouth 2 times daily  Patient not taking: Reported on 8/3/2021   Hank Child MD   Cholecalciferol (VITAMIN D3 PO) Take 2,000 Units by mouth daily  Patient not taking: Reported on 8/3/2021   Reported, Patient   cinnamon 500 MG CAPS Take 500-1,000 mg by mouth 2 times daily  Patient not  taking: Reported on 8/3/2021   Reported, Patient   Coenzyme Q10 (COQ10 PO) Take 1 capsule by mouth daily  Patient not taking: Reported on 8/3/2021   Reported, Patient   cyanocolbalamin (VITAMIN  B-12) 500 MCG tablet Take 500 mcg by mouth daily  Patient not taking: Reported on 8/3/2021   Reported, Patient   Ginseng 100 MG CAPS Take 2 capsules by mouth daily  Patient not taking: Reported on 8/3/2021   Reported, Patient   HERBALS Take 1 each by mouth 3 times daily [place]  Patient not taking: Reported on 8/3/2021   Reported, Patient   Lipoic Acid 150 MG CAPS Take 1 capsule by mouth 3 times daily  Patient not taking: Reported on 8/3/2021   Reported, Patient   Magnesium 500 MG CAPS Take 1 capsule by mouth daily  Patient not taking: Reported on 8/3/2021   Reported, Patient   metFORMIN (GLUCOPHAGE) 500 MG tablet Take 1,000 mg by mouth 2 times daily (with meals)   Reported, Patient   Multiple Vitamins-Minerals (ICAPS MV PO) Take 1 tablet by mouth daily  Patient not taking: Reported on 8/3/2021   Reported, Patient   Multiple Vitamins-Minerals (MAGNA C-7 OR)    Reported, Patient   Omega-3 Fatty Acids (OMEGA-3 FISH OIL PO) Take 1 g by mouth daily  Patient not taking: Reported on 8/3/2021   Reported, Patient   Turmeric 500 MG CAPS Take 1 capsule by mouth daily  Patient not taking: Reported on 8/3/2021   Reported, Patient          Review of Systems:     A Comprehensive greater than 10 system review of systems was carried out.  Pertinent positives and negatives are noted above in HPI.  Otherwise negative for contributory information.           Physical Exam:     Vital signs were reviewed    Temp:  [97.7  F (36.5  C)-99.4  F (37.4  C)] 97.7  F (36.5  C)  Pulse:  [78-81] 80  Resp:  [19] 19  BP: (131-142)/(68-97) 142/68  SpO2:  [96 %-100 %] 96 %        GEN: awake, alert, cooperative, no apparent distress, oriented x 3    NECK:Supple ,no mass or thyromegaly     HEENT:  Normocephalic/atraumatic, no scleral icterus, no nasal  discharge, mouth moist.    CV:  Regular rate and rhythm, no murmur or JVD.  S1 + S2 noted, no S3 or S4.    LUNGS:  Clear to auscultation bilaterally without rales/rhonchi/wheezing/retractions.  Symmetric chest rise on inhalation noted.    ABD:  Active bowel sounds, soft, non-tender/non-distended.  No rebound/guarding/rigidity.    EXT: Left foot erythema, swelling and wound as shown above  LGS: No cervical or axillary lymphadenopathy     SKIN:  Dry to touch, warm ,no exanthems noted in the visualized areas.    Neurologic:Grossly intact,non focal . No acute focal neurologic deficit     Psychaitric exam: Mood and affect normal                    Data:       All laboratory and imaging data in the past 24 hours reviewed     Results for orders placed or performed during the hospital encounter of 08/03/21   XR Ankle Left G/E 3 Views     Status: None    Narrative    EXAM: XR ANKLE LEFT G/E 3 VIEWS  LOCATION: [facility]  DATE/TIME: 8/3/2021 11:39 PM    INDICATION: diabetic ulcer overlying left lateral malleolus  COMPARISON: None.      Impression    IMPRESSION: Bimalleolar soft tissue swelling. Soft tissue defect over lateral malleolus, may reflect ulcer provided history. No radiographic evidence of osteomyelitis. Atherosclerotic calcifications.   Basic metabolic panel (BMP)     Status: Abnormal   Result Value Ref Range    Sodium 136 133 - 144 mmol/L    Potassium 3.9 3.4 - 5.3 mmol/L    Chloride 104 94 - 109 mmol/L    Carbon Dioxide (CO2) 25 20 - 32 mmol/L    Anion Gap 7 3 - 14 mmol/L    Urea Nitrogen 27 7 - 30 mg/dL    Creatinine 1.16 0.66 - 1.25 mg/dL    Calcium 8.8 8.5 - 10.1 mg/dL    Glucose 182 (H) 70 - 99 mg/dL    GFR Estimate 63 >60 mL/min/1.73m2   Extra Blue Top Tube     Status: None   Result Value Ref Range    Hold Specimen JIC    Extra Red Top Tube     Status: None   Result Value Ref Range    Hold Specimen JIC    Extra Green Top (Lithium Heparin) Tube     Status: None   Result Value Ref Range     Hold Specimen JIC    Extra Purple Top Tube     Status: None   Result Value Ref Range    Hold Specimen JIC    CBC with platelets and differential     Status: Abnormal   Result Value Ref Range    WBC Count 12.9 (H) 4.0 - 11.0 10e3/uL    RBC Count 4.35 (L) 4.40 - 5.90 10e6/uL    Hemoglobin 13.1 (L) 13.3 - 17.7 g/dL    Hematocrit 39.9 (L) 40.0 - 53.0 %    MCV 92 78 - 100 fL    MCH 30.1 26.5 - 33.0 pg    MCHC 32.8 31.5 - 36.5 g/dL    RDW 13.4 10.0 - 15.0 %    Platelet Count 209 150 - 450 10e3/uL    % Neutrophils 79 %    % Lymphocytes 13 %    % Monocytes 7 %    % Eosinophils 1 %    % Basophils 0 %    % Immature Granulocytes 0 %    NRBCs per 100 WBC 0 <1 /100    Absolute Neutrophils 10.2 (H) 1.6 - 8.3 10e3/uL    Absolute Lymphocytes 1.7 0.8 - 5.3 10e3/uL    Absolute Monocytes 0.8 0.0 - 1.3 10e3/uL    Absolute Eosinophils 0.1 0.0 - 0.7 10e3/uL    Absolute Basophils 0.0 0.0 - 0.2 10e3/uL    Absolute Immature Granulocytes 0.0 <=0.0 10e3/uL    Absolute NRBCs 0.0 10e3/uL   Lactic acid whole blood     Status: Normal   Result Value Ref Range    Lactic Acid 1.0 0.7 - 2.0 mmol/L   SARS-COV2 (COVID-19) Virus RT-PCR     Status: Normal    Specimen: Nasopharyngeal; Swab   Result Value Ref Range    SARS CoV2 PCR Negative Negative    Narrative    Testing was performed using the triny  SARS-CoV-2 & Influenza A/B Assay on the triny  Nguyen  System.  This test should be ordered for the detection of SARS-COV-2 in individuals who meet SARS-CoV-2 clinical and/or epidemiological criteria. Test performance is unknown in asymptomatic patients.  This test is for in vitro diagnostic use under the FDA EUA for laboratories certified under CLIA to perform moderate and/or high complexity testing. This test has not been FDA cleared or approved.  A negative test does not rule out the presence of PCR inhibitors in the specimen or target RNA in concentration below the limit of detection for the assay. The possibility of a false negative should be  considered if the patient's recent exposure or clinical presentation suggests COVID-19.  [facility] are certified under the Clinical Laboratory Improvement Amendments of 1988 (CLIA-88) as qualified to perform moderate and/or high complexity laboratory testing.   Glucose by meter     Status: Abnormal   Result Value Ref Range    GLUCOSE BY METER POCT 237 (H) 70 - 99 mg/dL   Asymptomatic COVID-19 Virus (Coronavirus) by PCR Nasopharyngeal     Status: Normal    Specimen: Nasopharyngeal; Swab    Narrative    The following orders were created for panel order Asymptomatic COVID-19 Virus (Coronavirus) by PCR Nasopharyngeal.  Procedure                               Abnormality         Status                     ---------                               -----------         ------                     SARS-COV2 (COVID-19) Vir...[[id number]]  Normal              Final result                 Please view results for these tests on the individual orders.   Extra Tube ([place] Draw)     Status: None (In process)    Narrative    The following orders were created for panel order Extra Tube ([place] Draw).  Procedure                               Abnormality         Status                     ---------                               -----------         ------                     Extra Blue Top Tube[[id number]]                              Final result               Extra Red Top Tube[[id number]]                               Final result               Extra Green Top (Lithium...[[id number]]                      Final result               Extra Green Top (Lithium...[[id number]]                                                 Extra Purple Top Tube[[id number]]                            Final result                 Please view results for these tests on the individual orders.   CBC + differential     Status: Abnormal    Narrative    The following orders were created for panel order CBC + differential.  Procedure                                Abnormality         Status                     ---------                               -----------         ------                     CBC with platelets and d...[[id number]]  Abnormal            Final result                 Please view results for these tests on the individual orders.             All imaging studies reviewed by me.         Patient`s old medical records reviewed and case discussed with the ED physician.    ED course-Reviewed  and care plan discussed with Carlos Pinon MD

## 2021-08-04 NOTE — PHARMACY-VANCOMYCIN DOSING SERVICE
"Pharmacy Vancomycin Initial Note  Date of Service 2021  Patient's  1950  71 year old, male    Indication: Skin and Soft Tissue Infection    Current estimated CrCl = Estimated Creatinine Clearance: 70.3 mL/min (based on SCr of 1.16 mg/dL).    Creatinine for last 3 days  8/3/2021:  7:20 PM Creatinine 1.16 mg/dL    Recent Vancomycin Level(s) for last 3 days  No results found for requested labs within last 72 hours.      Vancomycin IV Administrations (past 72 hours)      No vancomycin orders with administrations in past 72 hours.                Nephrotoxins and other renal medications (From now, onward)    Start     Dose/Rate Route Frequency Ordered Stop    21 2200  vancomycin (VANCOCIN) 1,750 mg in sodium chloride 0.9 % 500 mL intermittent infusion      1,750 mg  over 2 Hours Intravenous EVERY 24 HOURS 21 0256      21 0600  piperacillin-tazobactam (ZOSYN) intermittent infusion 4.5 g     Note to Pharmacy: For [facility], [facility] and [facility]: For Zosyn-naive patients, use the \"Zosyn initial dose + extended infusion\" order panel.    4.5 g  200 mL/hr over 30 Minutes Intravenous EVERY 6 HOURS 21 0152      21 0300  vancomycin (VANCOCIN) 1,750 mg in sodium chloride 0.9 % 500 mL intermittent infusion      1,750 mg  over 2 Hours Intravenous ONCE 21 0255            Contrast Orders - past 72 hours (72h ago, onward)    None          Loading dose: 1750 mg  Regimen: 1750 mg IV every 24 hours.  Start time: 02:56 on 2021  Exposure target: AUC24 (range)400-600 mg/L.hr   AUC24,ss: 501 mg/L.hr  Probability of AUC24 > 400: 75 %  Ctrough,ss: 14.5 mg/L  Probability of Ctrough,ss > 20: 23 %  Probability of nephrotoxicity (Lodise PATRIZIA ): 10 %          Plan:  1. Start vancomycin 1750 mg IV loading dose, then 1750 mg q24h starting 21 @2200.   2. Vancomycin monitoring method: AUC  3. Vancomycin therapeutic monitoring goal: 400-600 mg*h/L  4. Pharmacy will check vancomycin levels as appropriate " in 1-3 Days.    5. Serum creatinine levels will be ordered daily for the first week of therapy and at least twice weekly for subsequent weeks.      Eve Brown [facility]

## 2021-08-05 ENCOUNTER — HOSPITAL ENCOUNTER (INPATIENT)
Facility: CLINIC | Age: 71
LOS: 6 days | Discharge: HOME HEALTH | DRG: 623 | End: 2021-08-11
Attending: INTERNAL MEDICINE | Admitting: STUDENT IN AN ORGANIZED HEALTH CARE EDUCATION/TRAINING PROGRAM
Payer: MEDICARE

## 2021-08-05 VITALS
OXYGEN SATURATION: 96 % | WEIGHT: 221.1 LBS | SYSTOLIC BLOOD PRESSURE: 163 MMHG | TEMPERATURE: 96.2 F | HEART RATE: 69 BPM | RESPIRATION RATE: 18 BRPM | HEIGHT: 70 IN | DIASTOLIC BLOOD PRESSURE: 80 MMHG | BODY MASS INDEX: 31.65 KG/M2

## 2021-08-05 DIAGNOSIS — E11.42 DIABETIC POLYNEUROPATHY ASSOCIATED WITH TYPE 2 DIABETES MELLITUS (H): Primary | ICD-10-CM

## 2021-08-05 DIAGNOSIS — I10 ESSENTIAL HYPERTENSION: ICD-10-CM

## 2021-08-05 DIAGNOSIS — L97.324: ICD-10-CM

## 2021-08-05 DIAGNOSIS — M86.162 OTHER ACUTE OSTEOMYELITIS OF LEFT TIBIA (H): ICD-10-CM

## 2021-08-05 DIAGNOSIS — M86.9: ICD-10-CM

## 2021-08-05 DIAGNOSIS — M86.272 SUBACUTE OSTEOMYELITIS OF LEFT ANKLE (H): ICD-10-CM

## 2021-08-05 LAB
ANION GAP SERPL CALCULATED.3IONS-SCNC: 3 MMOL/L (ref 3–14)
BUN SERPL-MCNC: 18 MG/DL (ref 7–30)
CALCIUM SERPL-MCNC: 8.5 MG/DL (ref 8.5–10.1)
CHLORIDE BLD-SCNC: 107 MMOL/L (ref 94–109)
CO2 SERPL-SCNC: 26 MMOL/L (ref 20–32)
CREAT SERPL-MCNC: 0.97 MG/DL (ref 0.66–1.25)
ERYTHROCYTE [DISTWIDTH] IN BLOOD BY AUTOMATED COUNT: 13.5 % (ref 10–15)
GFR SERPL CREATININE-BSD FRML MDRD: 79 ML/MIN/1.73M2
GLUCOSE BLD-MCNC: 199 MG/DL (ref 70–99)
GLUCOSE BLDC GLUCOMTR-MCNC: 188 MG/DL (ref 70–99)
GLUCOSE BLDC GLUCOMTR-MCNC: 193 MG/DL (ref 70–99)
GLUCOSE BLDC GLUCOMTR-MCNC: 239 MG/DL (ref 70–99)
HCT VFR BLD AUTO: 38.6 % (ref 40–53)
HGB BLD-MCNC: 12.4 G/DL (ref 13.3–17.7)
MCH RBC QN AUTO: 29.7 PG (ref 26.5–33)
MCHC RBC AUTO-ENTMCNC: 32.1 G/DL (ref 31.5–36.5)
MCV RBC AUTO: 93 FL (ref 78–100)
PLATELET # BLD AUTO: 203 10E3/UL (ref 150–450)
POTASSIUM BLD-SCNC: 4.3 MMOL/L (ref 3.4–5.3)
RBC # BLD AUTO: 4.17 10E6/UL (ref 4.4–5.9)
SODIUM SERPL-SCNC: 136 MMOL/L (ref 133–144)
WBC # BLD AUTO: 10.3 10E3/UL (ref 4–11)

## 2021-08-05 PROCEDURE — 250N000011 HC RX IP 250 OP 636: Performed by: INTERNAL MEDICINE

## 2021-08-05 PROCEDURE — 250N000013 HC RX MED GY IP 250 OP 250 PS 637: Performed by: INTERNAL MEDICINE

## 2021-08-05 PROCEDURE — 99232 SBSQ HOSP IP/OBS MODERATE 35: CPT | Performed by: PODIATRIST

## 2021-08-05 PROCEDURE — 250N000013 HC RX MED GY IP 250 OP 250 PS 637: Performed by: PHYSICIAN ASSISTANT

## 2021-08-05 PROCEDURE — 85027 COMPLETE CBC AUTOMATED: CPT | Performed by: INTERNAL MEDICINE

## 2021-08-05 PROCEDURE — 250N000012 HC RX MED GY IP 250 OP 636 PS 637: Performed by: PHYSICIAN ASSISTANT

## 2021-08-05 PROCEDURE — 250N000011 HC RX IP 250 OP 636: Performed by: PHYSICIAN ASSISTANT

## 2021-08-05 PROCEDURE — 99222 1ST HOSP IP/OBS MODERATE 55: CPT | Mod: AI | Performed by: PHYSICIAN ASSISTANT

## 2021-08-05 PROCEDURE — 258N000003 HC RX IP 258 OP 636: Performed by: PHYSICIAN ASSISTANT

## 2021-08-05 PROCEDURE — 36415 COLL VENOUS BLD VENIPUNCTURE: CPT | Performed by: INTERNAL MEDICINE

## 2021-08-05 PROCEDURE — 258N000003 HC RX IP 258 OP 636: Performed by: INTERNAL MEDICINE

## 2021-08-05 PROCEDURE — 80048 BASIC METABOLIC PNL TOTAL CA: CPT | Performed by: INTERNAL MEDICINE

## 2021-08-05 PROCEDURE — 120N000001 HC R&B MED SURG/OB

## 2021-08-05 PROCEDURE — 99207 PR APP CREDIT; MD BILLING SHARED VISIT: CPT | Performed by: PHYSICIAN ASSISTANT

## 2021-08-05 RX ORDER — ACETAMINOPHEN 325 MG/1
650 TABLET ORAL EVERY 6 HOURS PRN
Status: DISCONTINUED | OUTPATIENT
Start: 2021-08-05 | End: 2021-08-11 | Stop reason: HOSPADM

## 2021-08-05 RX ORDER — LIDOCAINE 40 MG/G
CREAM TOPICAL
Status: DISCONTINUED | OUTPATIENT
Start: 2021-08-05 | End: 2021-08-07

## 2021-08-05 RX ORDER — NICOTINE POLACRILEX 4 MG
15-30 LOZENGE BUCCAL
Status: DISCONTINUED | OUTPATIENT
Start: 2021-08-05 | End: 2021-08-11 | Stop reason: HOSPADM

## 2021-08-05 RX ORDER — NALOXONE HYDROCHLORIDE 0.4 MG/ML
0.2 INJECTION, SOLUTION INTRAMUSCULAR; INTRAVENOUS; SUBCUTANEOUS
Status: DISCONTINUED | OUTPATIENT
Start: 2021-08-05 | End: 2021-08-11 | Stop reason: HOSPADM

## 2021-08-05 RX ORDER — SODIUM CHLORIDE 9 MG/ML
INJECTION, SOLUTION INTRAVENOUS CONTINUOUS
Status: DISCONTINUED | OUTPATIENT
Start: 2021-08-06 | End: 2021-08-09

## 2021-08-05 RX ORDER — AMOXICILLIN 250 MG
1 CAPSULE ORAL 2 TIMES DAILY PRN
Status: DISCONTINUED | OUTPATIENT
Start: 2021-08-05 | End: 2021-08-11 | Stop reason: HOSPADM

## 2021-08-05 RX ORDER — DEXTROSE MONOHYDRATE 25 G/50ML
25-50 INJECTION, SOLUTION INTRAVENOUS
Status: DISCONTINUED | OUTPATIENT
Start: 2021-08-05 | End: 2021-08-11 | Stop reason: HOSPADM

## 2021-08-05 RX ORDER — ONDANSETRON 2 MG/ML
4 INJECTION INTRAMUSCULAR; INTRAVENOUS EVERY 6 HOURS PRN
Status: DISCONTINUED | OUTPATIENT
Start: 2021-08-05 | End: 2021-08-07

## 2021-08-05 RX ORDER — AMOXICILLIN 250 MG
2 CAPSULE ORAL 2 TIMES DAILY PRN
Status: DISCONTINUED | OUTPATIENT
Start: 2021-08-05 | End: 2021-08-11 | Stop reason: HOSPADM

## 2021-08-05 RX ORDER — CEFAZOLIN SODIUM 1 G/50ML
1750 SOLUTION INTRAVENOUS EVERY 24 HOURS
Status: DISCONTINUED | OUTPATIENT
Start: 2021-08-05 | End: 2021-08-06

## 2021-08-05 RX ORDER — ACETAMINOPHEN 650 MG/1
650 SUPPOSITORY RECTAL EVERY 6 HOURS PRN
Status: DISCONTINUED | OUTPATIENT
Start: 2021-08-05 | End: 2021-08-11 | Stop reason: HOSPADM

## 2021-08-05 RX ORDER — PIPERACILLIN SODIUM, TAZOBACTAM SODIUM 3; .375 G/15ML; G/15ML
3.38 INJECTION, POWDER, LYOPHILIZED, FOR SOLUTION INTRAVENOUS EVERY 6 HOURS
Status: DISCONTINUED | OUTPATIENT
Start: 2021-08-05 | End: 2021-08-09

## 2021-08-05 RX ORDER — ONDANSETRON 4 MG/1
4 TABLET, ORALLY DISINTEGRATING ORAL EVERY 6 HOURS PRN
Status: DISCONTINUED | OUTPATIENT
Start: 2021-08-05 | End: 2021-08-07

## 2021-08-05 RX ORDER — OXYCODONE HYDROCHLORIDE 5 MG/1
5 TABLET ORAL EVERY 4 HOURS PRN
Status: DISCONTINUED | OUTPATIENT
Start: 2021-08-05 | End: 2021-08-07

## 2021-08-05 RX ORDER — NALOXONE HYDROCHLORIDE 0.4 MG/ML
0.4 INJECTION, SOLUTION INTRAMUSCULAR; INTRAVENOUS; SUBCUTANEOUS
Status: DISCONTINUED | OUTPATIENT
Start: 2021-08-05 | End: 2021-08-11 | Stop reason: HOSPADM

## 2021-08-05 RX ADMIN — Medication 1 MG: at 00:23

## 2021-08-05 RX ADMIN — PIPERACILLIN SODIUM AND TAZOBACTAM SODIUM 3.38 G: 3; .375 INJECTION, POWDER, LYOPHILIZED, FOR SOLUTION INTRAVENOUS at 19:41

## 2021-08-05 RX ADMIN — TAZOBACTAM SODIUM AND PIPERACILLIN SODIUM 4.5 G: 500; 4 INJECTION, SOLUTION INTRAVENOUS at 08:05

## 2021-08-05 RX ADMIN — SODIUM CHLORIDE: 9 INJECTION, SOLUTION INTRAVENOUS at 19:33

## 2021-08-05 RX ADMIN — OXYCODONE HYDROCHLORIDE 5 MG: 5 TABLET ORAL at 02:25

## 2021-08-05 RX ADMIN — VANCOMYCIN HYDROCHLORIDE 1750 MG: 5 INJECTION, POWDER, LYOPHILIZED, FOR SOLUTION INTRAVENOUS at 21:29

## 2021-08-05 RX ADMIN — OXYCODONE HYDROCHLORIDE 5 MG: 5 TABLET ORAL at 19:41

## 2021-08-05 RX ADMIN — INSULIN ASPART 2 UNITS: 100 INJECTION, SOLUTION INTRAVENOUS; SUBCUTANEOUS at 08:05

## 2021-08-05 RX ADMIN — TAZOBACTAM SODIUM AND PIPERACILLIN SODIUM 4.5 G: 500; 4 INJECTION, SOLUTION INTRAVENOUS at 02:15

## 2021-08-05 ASSESSMENT — ACTIVITIES OF DAILY LIVING (ADL)
ADLS_ACUITY_SCORE: 18
DIFFICULTY_COMMUNICATING: NO
CONCENTRATING,_REMEMBERING_OR_MAKING_DECISIONS_DIFFICULTY: NO
HEARING_DIFFICULTY_OR_DEAF: NO
NUMBER_OF_TIMES_PATIENT_HAS_FALLEN_WITHIN_LAST_SIX_MONTHS: 1
DRESSING/BATHING_DIFFICULTY: NO
PATIENT_/_FAMILY_COMMUNICATION_STYLE: SPOKEN LANGUAGE (ENGLISH OR BILINGUAL)
DOING_ERRANDS_INDEPENDENTLY_DIFFICULTY: NO
WALKING_OR_CLIMBING_STAIRS_DIFFICULTY: NO
DIFFICULTY_EATING/SWALLOWING: NO
WEAR_GLASSES_OR_BLIND: YES
TOILETING_ISSUES: NO
FALL_HISTORY_WITHIN_LAST_SIX_MONTHS: YES

## 2021-08-05 NOTE — DISCHARGE SUMMARY
Patient alert and oriented  Peripheral IV saline locked- transferring with it in place  Pain controlled, declined needing intervention  VSS, on room air  Report called by previous nurse  Report given to EMS  Transfer via [facility] stretcher to [facility] for further care  All belongings sent with patient

## 2021-08-05 NOTE — PLAN OF CARE
"Vitals: BP (!) 157/69 (BP Location: Right arm)   Pulse 77   Temp 97.5  F (36.4  C) (Oral)   Resp 16   Ht 1.778 m (5' 10\")   Wt 100.3 kg (221 lb 1.6 oz)   SpO2 95%   BMI 31.72 kg/m      Neuro: WNL, A&O x4  Cardiac: WNL  Lungs: WNL  GI: WNL  : WNL  Skin: Cellulitis of L ankle, redness and swelling, borders marked with no growth  Activity: SBA  Diet: Regular  Pain: Reports pain in L ankle, oxy given x1  IV: Peripheral  mL/hr   Meds: Vanco and Zosyn  Labs/tests: MRI foot/ankle shows osteomyelitis  Plan: IV fluids, antibiotics, and plan to discharge to [facility] when bed is available.Will provide supportive care.      "

## 2021-08-05 NOTE — CONSULTS
ID consult dictated IMP 1 70 yo male DM and likely PVD, prior christen amp 15 yrs ago, complex wf infection R lateral foot 3/21 resolved/, now new R foot traumatic wd and cellulitis, prob not deep despite MRI    REC  Same for now, await podiatry,

## 2021-08-05 NOTE — PROGRESS NOTES
X-COVER    MRI Foot/Ankle reviewed and suggests left fibular osteomyelitis.  Per discussion with rounding provider, patient's ABIs revealed moderate RLE and moderate-to-severe LLE arterial insufficiency (severe in the left foot).  Given osteomyelitis in setting of severe PVD, will likely need transfer to [facility].  Patient currently stable.      Contacted Patient [facility]acement.  They are not sure what staffing looks like for Med Beds right now.  Will reach out to ANS and call back with an update.     Glenny PAC        ADDENDUM:   No beds available tonight.  Will need to call in AM to assess bed/staffing availability.

## 2021-08-05 NOTE — H&P
[facility]    History and Physical - Hospitalist Service       Date of Admission: 08/05/21     Assessment & Plan      Rangel Yang is a 71 year old male with a history of DM Type 2, Peripheral Neuropathy, Previous Left 5th toe amputation who was initially admitted 8/3/21 to [facility] for evaluation left ankle wound and cellulitis. He was admitted and started on IV antibiotics. MRI concerning for osteomyelitis for the distal fibular and evidence of aterial insufficiency on MIYA. He is transferred for vascular surgery consultation prior to surgical I&D.      Left ankle wound with associated cellulitis  Osteomyelitis of distal left fibula: Pt was doing some remodeling work last week when he scraped his left anterior lower extremity and left ankle on the aluminum ladder.  He started to develop increased erythema and opening of the left ankle wound 2-3 days prior to admission.  Xray on admission with bimalleolar soft tissue swelling with no convincing evidence of osteomyelitis.  MIYA were obtained which showed BLE arterial insufficiency.  MRI obtained which shows distal fibula osteitis and concern for early osteomyelitis. Has been on vancomycin and Zosyn since admission. BC NGTD  - Admit inpatient  - Continue IV vancomycin and Zosyn  - Podiatry consulted, appreciate assistance  --ID consulted, appreciate assistance  -vascular workup    Peripheral Arterial Disease: MIYA revealed moderate arterial insufficiency in the RLE and moderate to severe in the LLE.  - Vascular surgery consultation  -  NPO at midnight    DM-2 with neuropathy, A1C 7.6: Metformin 1000 mg bid PTA  - Hold Metformin  - SSI      Diet:  moderate carbohydrate, NPO at midnight   DVT Prophylaxis: Pneumatic Compression Devices  Manuel Catheter: Not present  Central Lines: None  Code Status:   FULL CODE     Disposition Plan   Expected discharge: 2+ days pending vascular evaluation, podiatry plan. Anticipate some type of surgical  intervention in next few days.      The patient's care was discussed with Dr. Loi Carranza PA-C    _____________________________________________________________________    Chief Complaint   Osteomyelitis    History is obtained from the patient    History of Present Illness   Rangel Yang is a 71 year old male with a past medical history of type 2 diabetes, peripheral neuropathy with previous left fifth toe amputation who was transferred from [facility] for evaluation of osteomyelitis and suspected peripheral vascular disease.    Patient had initially sustained a mechanical injury when he fell off a ladder approximately week prior to admission.  He presented to [facility] 8/3/21 with complaints of increasing swelling and erythema.  He was admitted and started on IV antibiotics.  MRI was obtained which was concerning for osteomyelitis of the distal fibula.  ABIs indicative of peripheral vascular disease.  Podiatry was consulted who recommended transfer to [facility] for vascular surgery consultation prior to surgical I&D. He is presently evaluated in his room on the medical floor. Reports increased pain after MRI today, improved with oxycodone. No fevers, chills. Currently does not feel generally ill. Reports improvement in redness, swelling significantly since admission to [facility]. No CP, SOB, nausea.     Review of Systems    The 10 point Review of Systems is negative other than noted in the HPI or here.     Past Medical History    I have reviewed this patient's medical history and updated it with pertinent information if needed.   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  Past Surgical History:   Procedure Laterality Date     TONSILLECTOMY & ADENOIDECTOMY       [facility] NONSPECIFIC PROCEDURE  4-    Open left 5th toe amputation, Dr. Hussein Engle     [facility] NONSPECIFIC PROCEDURE  4-    Full thickness subcutaneous debridement of left open 5th ray amputation site 04/19/2010, Dr. Hussein Engle.     [facility] NONSPECIFIC PROCEDURE  4-    Completion 5th ray metatarsal amputation with wound closure and full thickness subcutaneous debridement, Dr. Hussein Engle 04/21/2010.     [facility] NONSPECIFIC PROCEDURE  2008    varicose vein surgery of the left lower extremity.       Social History   I have reviewed this patient's social history and updated it with pertinent information if needed.  Social History     Tobacco Use     Smoking status: Former Smoker   Substance Use Topics     Alcohol use: Yes     Comment: occ     Drug use: No       Family History   I have reviewed this patient's family history and updated it with pertinent information if needed.  Family History   Problem Relation Age of Onset     C.A.D. Father         Father with coronary artery disease in his 40s.     Diabetes Mother        Prior to Admission Medications   Cannot display prior to admission medications because the patient has not been admitted in this contact.     Allergies   No Known Allergies    Physical Exam   Temp: (!) 96.3  F (35.7  C) Temp src: Oral BP: (!) 185/84 Pulse: 72   Resp: 16 SpO2: 100 % O2 Device: None (Room air)    There were no vitals filed for this visit.  Vital Signs with Ranges  Temp:  [96.2  F (35.7  C)-99  F  (37.2  C)] 96.3  F (35.7  C)  Pulse:  [69-78] 72  Resp:  [16-18] 16  BP: (135-185)/(64-84) 185/84  SpO2:  [95 %-100 %] 100 %  No intake/output data recorded.    Constitutional: Alert and oriented, sitting up in bed. Appears comfortable and is appropriately conversant   ENT: moist mucous membranes  Eyes:  Sclera anicteric, EOMI  Respiratory: Lungs clear to auscultation bilaterally, no increased work of breathing  Cardiovascular: Regular rate and rhythm, + murmur  GI:  active bowel sounds, abdomen soft, non-tender  Skin/Integumen: wound present lateral malleolus with purulent drainage. Erythema surrounding and up into shin though receeding from previously marked borders  Msk:  Moves all four extremities  Neuro:  Speech is clear. Face symmetric. CN 2-12 grossly intact.     Data   Data reviewed today: I reviewed all medications, new labs and imaging results over the last 24 hours. I personally reviewed no images or EKG's today.    Recent Labs   Lab 08/05/21  1114 08/05/21  0648 08/05/21  0511 08/03/21  1920   WBC  --   --  10.3 12.9*   HGB  --   --  12.4* 13.1*   MCV  --   --  93 92   PLT  --   --  203 209   NA  --   --  136 136   POTASSIUM  --   --  4.3 3.9   CHLORIDE  --   --  107 104   CO2  --   --  26 25   BUN  --   --  18 27   CR  --   --  0.97 1.16   ANIONGAP  --   --  3 7   SHEILA  --   --  8.5 8.8   * 193* 199* 182*

## 2021-08-05 NOTE — PLAN OF CARE
PRIMARY DIAGNOSIS: SOFT TISSUE INFECTIONS  OUTPATIENT/OBSERVATION GOALS TO BE MET BEFORE DISCHARGE:  Vitals sign stable or return to baseline: Yes     Tolerating oral antibiotics or has home infusion set up if applicable: Yes     Pain status: Pain free.     Return to near baseline physical activity: Yes     Discharge Planner Nurse   Safe discharge environment identified: Yes  Barriers to discharge: Yes         Please review provider order for any additional goals.      Nurse to notify provider when observation goals have been met and patient is ready for discharge.     Pt is alert and oriented. Denies pain. IND in room. ID consulted. Patient receiving Vanco and Zosyn. Podiatry recommending transfer to D for vascular consult before podiatry intervention. L ankle wound with mepilex, all other wounds JESSICA.

## 2021-08-05 NOTE — PROGRESS NOTES
[facility]  Internal Medicine  Progress Note    Date of Service: 8/5/2021    Patient: Rangel Yang  MRN: [id number]  Admission Date: 8/3/2021  Hospital Day # 3    Assessment & Plan: Rangel Yang is a 71 year old male with a history of DM Type 2, Peripheral Neuropathy, Previous Left 5th toe amputation who presented with LLE erythema, swelling and wound.     LLE Wound with Cellulitis and Osteomyelitis - pt was doing some remodeling work last week when he scraped his left anterior lower extremity and left ankle on the aluminum ladder.  He started to develop increased erythema and opening of the left ankle wound 2-3 days prior to admission.  Xray on admission with bimalleolar soft tissue swelling with no convincing evidence of osteomyelitis.  MIYA were obtained which showed BLE arterial insufficiency.  MRI obtained which shows distal fibula osteitis and concern for early osteomyelitis.   - continue Vancomycin and Zosyn  - Infectious Disease consult  - Podiatry consulted and recommend transfer to [facility] for Vascular Surgery evaluation prior to Podiatry surgical intervention  - analgesics prn    Peripheral Arterial Disease - MIYA revealed moderate arterial insufficiency in the RLE and moderate to severe in the LLE.  - will transfer to [facility] for Vascular Surgery consultation      DM Type 2 - hgb A1C 7.6.  -199.  - holding pta Metformin  - continue ISS protocol     CODE: full  DVT: SCD  Diet/fluids: regular diet, NS  Disposition: transfer to [facility]; no beds available at this time.    Viv Pacheco MS, PA-C  Hospitalist Physician Assistant  [facility]  Pager: [phone]      Subjective & Interval Hx:    Patient reports pain is controlled.  Denies chest pain, shortness of breath, abdominal pain.  He thinks the redness and swelling of the LLE have improved since yesterday.    Last 24 hr care team notes reviewed.   ROS:  4 point ROS including Respiratory, CV, GI and  ", other than that noted in the HPI, is negative.    Physical Exam:    Blood pressure 135/64, pulse 78, temperature 98.4  F (36.9  C), temperature source Oral, resp. rate 16, height 1.778 m (5' 10\"), weight 100.3 kg (221 lb 1.6 oz), SpO2 98 %.  General: Alert, interactive, NAD, lying in bed  HEENT: AT/NC  Resp: clear to auscultation bilaterally, no crackles or wheezes  Cardiac: regular rate and rhythm, no murmur  Abdomen: Soft, nontender, nondistended. +BS  Extremities: LLE anterior shin to the foot with erythema today receeding from the previously marked borders. no exceeding outlined borders.  Left lateral malleolus dressing in place. Left lateral foot with chronic appearing dry ulceration which does not appear infected.    Neuro: Alert & oriented x 3, Cns 2-12 intact, moves all extremities equally  Labs & Images:  Reviewed in Epic   Medications:    Current Facility-Administered Medications   Medication     acetaminophen (TYLENOL) tablet 650 mg    Or     acetaminophen (TYLENOL) Suppository 650 mg     glucose gel 15-30 g    Or     dextrose 50 % injection 25-50 mL    Or     glucagon injection 1 mg     glucose gel 15-30 g    Or     dextrose 50 % injection 25-50 mL    Or     glucagon injection 1 mg     HYDROmorphone (DILAUDID) injection 0.2 mg     insulin aspart (NovoLOG) injection (RAPID ACTING)     insulin aspart (NovoLOG) injection (RAPID ACTING)     lidocaine (LMX4) cream     lidocaine 1 % 0.1-1 mL     melatonin tablet 1 mg     naloxone (NARCAN) injection 0.2 mg    Or     naloxone (NARCAN) injection 0.4 mg    Or     naloxone (NARCAN) injection 0.2 mg    Or     naloxone (NARCAN) injection 0.4 mg     oxyCODONE (ROXICODONE) tablet 5 mg     piperacillin-tazobactam (ZOSYN) intermittent infusion 4.5 g     sodium chloride (PF) 0.9% PF flush 3 mL     sodium chloride (PF) 0.9% PF flush 3 mL     sodium chloride 0.9% infusion     vancomycin (VANCOCIN) 1,750 mg in sodium chloride 0.9 % 500 mL intermittent infusion       "

## 2021-08-05 NOTE — PROGRESS NOTES
"Podiatry / Foot and Ankle Surgery Progress Note    August 5, 2021    Subject: Patient was seen at bedside. He states that the swelling and redness have come down since yesterday. Minimal to no pain to the area.    Objective:  Vitals: /64   Pulse 78   Temp 98.4  F (36.9  C) (Oral)   Resp 16   Ht 1.778 m (5' 10\")   Wt 100.3 kg (221 lb 1.6 oz)   SpO2 98%   BMI 31.72 kg/m    BMI= Body mass index is 31.72 kg/m .     WBC   Date Value Ref Range Status   04/22/2010 12.7 (H) 4.0 - 11.0 10e9/L Final     WBC Count   Date Value Ref Range Status   08/05/2021 10.3 4.0 - 11.0 10e3/uL Final     General:  Patient is alert and orientated.  NAD  HA1C: 7.6     C-reactive protein:  122  ESR: --: 14     General appearance: Patient is alert and fully cooperative with history & exam.  No sign of distress is noted during the visit.      Psychiatric: Affect is pleasant & appropriate.  Patient appears motivated to improve health.       Respiratory: Breathing is regular & unlabored while sitting.      HEENT: Hearing is intact to spoken word.  Speech is clear.  No gross evidence of visual impairment that would impact ambulation.       Dermatologic:  Full thickness ulceration to the lateral left ankle over distal fibula. Measures apprpoximately 1.4cm x 1.5cm x 0.4cm. does not probe to bone but base of wound is fibrous. Subcutaneous tissue expposed. Lightly malodorous. No purulent drainage noted. Redness up left leg is somewhat resolving from lines previously drawn.      Vascular: DP & PT pulses are not palpable bilaterally.  No significant edema or varicosities noted.  CFT and skin temperature is normal to both lower extremities.     Neurologic: Lower extremity sensation is diminished to feet.      Musculoskeletal: Patient is ambulatory without assistive device or brace. Previously left 5th toe amputation.      IMAGING: left ankle xray - I personally reviewed images - Bimalleolar soft tissue swelling. Soft tissue defect over lateral " malleolus, may reflect ulcer provided history. No radiographic evidence of osteomyelitis. Atherosclerotic calcifications.     MRI left ankle and foot:    1.Deep to marker placed over the distal lateral fibula there is a soft  tissue defect that extends to the periosteum of the bone. Associated  edema within the distal fibula with very subtle early marrow  replacement on T1-weighted images about the far distal fibula tip  laterally. These findings are most consistent with distal fibula  osteitis with very early osteomyelitis.   2. Significant soft tissue swelling proximal and distal to the fibula  tip this replacement of normal subcutaneous fat, consistent with  cellulitis.  3. Cellulitis of the soft tissues at the dorsal aspect of the second  through fifth metatarsals with the soft tissue wound abutting the  extensor and the periosteum of the fifth metatarsal head without  evidence of osteomyelitis in this area.  4. Nonspecific edema within the plantar musculature can be seen in the  context of diabetic microangiopathy or neuropathy.     MIYA:  Waveforms: Triphasic throughout the right lower extremity. Biphasic in  the left femoral and popliteal region. Monophasic in the distal left  tibial arteries. Dampened digital waveforms in the left foot.                                                                      IMPRESSION:  1. Ankle-brachial index in the right lower extremity indicate moderate  arterial insufficiency.  2. Ankle-brachial index in the left lower extremity indicates moderate  to severe arterial insufficiency. Left digital brachial index would  indicate severe arterial insufficiency in the left foot.     ASSESSMENT: 71 yr old diabetic male with PAD, ulcer left ankle, cellulitis, osteomyelitis of distal fibula.    Plan:    -Had a long discussion with patient about his MRI results. Explained that it is showing early signs of osteomyelitis in the distal fibula where the ulcer is located. Discussed that  with osteomyelitis/bone infection this needs to be surgically removed to try to get the infection under control. We discussed that antibiotics do not usually get rid of the infection itself.  -Discussed that I am also concerned with his decrease in blood flow and that if we proceed with surgery at this time this could cause worsening of the wound and nonhealing. He is at high risk of loss of below the knee amputation.  -I recommend transfer to [facility] for vascular assessment before proceeding with surgery on the left ankle.  -Discussed that what we would do is try to shave down some of the bone and feel the remaining distal fibula with antibiotic bone cement to try to help get rid of the infection. He will likely need a longer course of IV antibiotics.  -Patient is in agreement with plan.  -We will follow up with him at the [facility].        Kina Whitley DPM, Podiatry/Foot and Ankle Surgery  12:39 PM

## 2021-08-05 NOTE — CONSULTS
Consult Date: 08/05/2021    INFECTIOUS DISEASE CONSULTATION    LOCATION:  Room 212, [facility]    REFERRING PHYSICIAN:  Viv Pacheco PA-C    IMPRESSION:     1.  A 71-year-old male with underlying diabetes and probably peripheral vascular disease, prior recurrent left foot problems, now with recent traumatic lateral foot wound and cellulitis without major clinical sepsis.  MRI scan shows the wound tracks deep to bone, but unlikely by history to be osteomyelitis.  2.  Prior significant left lateral foot wound distal to the current area treated in [place] with extensive antibiotics and I and D.  Imaging without obvious infection at that site.  3.  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  4.  Diabetes mellitus.  5.  Probable peripheral vascular disease.    RECOMMENDATIONS:     1.  Continue same for now.  Probably do not need vancomycin, but one more day if cultures are negative, probably stop.  2.  Await Podiatry plan.  Aggressive approach here would be to biopsy the distal bone, make sure there is not actual osteomyelitis present.  If present, some amount of I and D and extended antibiotics.  If no deep infection, probably oral antibiotics here will be acceptable if he improves.  3.  Probably significant peripheral vascular disease workup underway.    HISTORY OF PRESENT ILLNESS:  This 71-year-old male is seen in consultation.  He has a history of diabetes and undiagnosed probable peripheral vascular disease.  He has had issues in his left foot going back 15 years at which time he had a toe amputation with infection.  Since that time, he has done relatively well in that leg, some minor issues.  Then in 03/2021, he was injured at a [employer] protest and developed an infection in his lateral left foot and also in his jaw.  That infection in the jaw resolved with antibiotics and treatment.  The foot wound required hospitalization in [place], where he lives.  He had an I and D and IV and then  oral antibiotics.  Not felt to have deep infection in that area of foot wound, which is distal to the current area resolved.    He then did well, fell off a ladder a few weeks ago and had an injury to the left lateral foot and some other minor lacerations on his leg.  All those have been healing.  The area seemed to be improving, but then in the last few days, significant redness, swelling and increased discomfort at the site occurred.  He is now admitted on antibiotics.  It is improved.    PAST MEDICAL HISTORY:  The prior infections as listed, history of diabetes controlled, although hemoglobin A1c has been in the 7's.    SOCIAL AND FAMILY HISTORY:  Lives in [place], visiting family here now.    MEDICATIONS:  As listed.    REVIEW OF SYSTEMS:  Some discomfort at the site.  Overall feels better at present.    PHYSICAL EXAMINATION:    GENERAL:  The patient appears his stated age.  Does not look toxic or ill.  VITAL SIGNS:  Currently normal.   HEART AND LUNGS:  Unremarkable.  ABDOMEN:  Soft and nontender.  EXTREMITIES:  The left leg with a mild cellulitis around the area wound as noted.  Distal foot looks okay, redness less than initial pictures showed.    IMAGING/LABORATORY:  Slight abnormality in that area, deep.  White blood cell count 12,900 initially.  Sed rate 14.    Thank you very much for the consultation.  I will follow the patient with you.    Amos Arora MD        D: 2021   T: 2021   MT: JEROMY    Name:     MATT SALAZAR  MRN:      [id number]        Account:      [id number]   :      1950           Consult Date: 2021     Document: [id number]    cc:  SADIE VILLEGAS PA-C

## 2021-08-05 NOTE — DISCHARGE SUMMARY
[facility] Observation Unit Discharge Summary          Rangel Yang MRN# [id number]   Age: 71 year old YOB: 1950     Date of Admission:  8/3/2021  Date of Discharge::  8/5/2021  Admitting Physician:  Jersey Watters MD  Discharge Physician:  Viv Pacheco PA-C  Primary Physician: Sandra Colmenares     Primary Discharge Diagnoses:   PAD, ulcer left ankle, cellulitis, osteomyelitis of distal fibula.     Secondary Discharge Diagnoses:   Diabetes Mellitus Type 2     Hospital Course:   For detail history, please refer to H & P from 8/3/2021. 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     LLE Wound with Cellulitis and Osteomyelitis - pt was doing some remodeling work last week when he scraped his left anterior lower extremity and left ankle on the aluminum ladder.  He started to develop increased erythema and opening of the left ankle wound 2-3 days prior to admission.  Xray on admission with bimalleolar soft tissue swelling with no convincing evidence of osteomyelitis.  MIYA were obtained which showed BLE arterial insufficiency.  MRI obtained which shows distal fibula osteitis and concern for early osteomyelitis.   - continue Vancomycin and Zosyn  - Infectious Disease consulted  - Podiatry consulted and recommend transfer to [facility] for Vascular Surgery evaluation prior to Podiatry surgical intervention  - patient was accepted in transfer by Dr. Ma with Internal Medicine     Peripheral Arterial Disease - MIYA revealed moderate arterial insufficiency in the RLE and moderate to severe in the LLE.  - will transfer to [facility] for Vascular Surgery consultation      DM Type 2 - hgb A1C 7.6.  -199.  - holding pta Metformin  - continue ISS protocol    Procedures/Imaging:     Results for orders placed or performed during the hospital  encounter of 08/03/21   XR Ankle Left G/E 3 Views    Narrative    EXAM: XR ANKLE LEFT G/E 3 VIEWS  LOCATION: [facility]  DATE/TIME: 8/3/2021 11:39 PM    INDICATION: diabetic ulcer overlying left lateral malleolus  COMPARISON: None.      Impression    IMPRESSION: Bimalleolar soft tissue swelling. Soft tissue defect over lateral malleolus, may reflect ulcer provided history. No radiographic evidence of osteomyelitis. Atherosclerotic calcifications.   US MIYA Doppler No Exercise    Narrative    IR MIYA US MIYA DOPPLER NO EXERCISE, 1-2 LEVELS, BILAT   8/4/2021 9:15  AM     HISTORY: left foot ulcers.  Assess bilateral bloodflow with toe  pressures, peripheral vascular disease.    COMPARISON: None.    FINDINGS:  Right MIYA:   DP: 0.88  PT: 0.85.    Left MIYA:   DP: 0.59   PT: 0.58.    Right Digital brachial index: 0.45, 64 mmHg.  Left Digital Brachial index: 0.17, 24 mmHg    Waveforms: Triphasic throughout the right lower extremity. Biphasic in  the left femoral and popliteal region. Monophasic in the distal left  tibial arteries. Dampened digital waveforms in the left foot.      Impression    IMPRESSION:  1. Ankle-brachial index in the right lower extremity indicate moderate  arterial insufficiency.  2. Ankle-brachial index in the left lower extremity indicates moderate  to severe arterial insufficiency. Left digital brachial index would  indicate severe arterial insufficiency in the left foot.     MIYA CRITERIA:  >0.95 Normal  0.90 - 0.94 Mild  0.5 - 0.89 Moderate  0.2 - 0.49 Severe  <0.2 Critical    DIGITAL BRACHIAL DIAGNOSTIC CRITERIA    > 0.7                                                     Normal  0.5-0.7                                                 Mild PAD  0.35-0.5                                               Moderate PAD  <0.35 & Toe pressure 40mmHG      Moderate to Severe PAD  <0.35 & Toe pressure <30mmHG    Severe PAD    MARY MILAN MD         SYSTEM ID:  [id number]   MR Ankle Left  w/o & w Contrast    Narrative    MR   left  ankle without  and foot with intravenous contrast 8/4/2021  3:50 PM    History: assess for fibular osteomyelitis    Techniques: Multiplanar multisequence imaging of the left ankle was  obtained without  administration of intra-articular or intravenous  contrast and the left foot before and after administration of contrast  agent.    Comparison: Foot radiograph dated 7/16/2016    Findings:    A marker is placed over the distal lateral fibula. There is a soft  tissue defect that extends to the periosteum of the bone. Significant  edema is noted surrounding the distal fibula. There is also edema  within the distal fibula and periosteal reaction (series 8, image 20).  On the T1-weighted images, very subtle early marrow replacement about  the distal fibula tip laterally just proximal to a osseous fragment is  noted. These findings are most consistent with distal fibula osteitis  with very early osteomyelitis.     Associated soft tissue edema and replacement of normal fatty  subcutaneous tissues, consistent this cellulitis extends approximately  6 cm proximal to the distal fibula and 3 cm distal to the distal  fibula surrounding the peroneal tendons, there is no tendinous  involvement.    In the foot there is an additional soft tissue defect overlying the  second through fifth metatarsals. Associated cellulitis and soft  tissue swelling is noted. The soft tissue defect abuts the distal  fifth metatarsal and envelops the extensor tendon without obvious  involvement of the bone.     Achilles tendon: Intact.    Plantar fascia: Normal.     ANTERIOR COMPARTMENT    Tendons: Anterior extensor tendons are intact.    JOINTS    Small ankle joint effusion.    GENERAL FINDINGS    Bones: Posttraumatic changes with osseous fragment distal to the  fibula and medial malleolus. Fracture deformity of the fourth  metatarsal. Degenerative changes of the purpose for this and third  tarsometatarsal  articulation. No acute fracture, marrow contusion. No  talar dome osteochondral lesion.    Muscles: Normal.    Tarsal tunnel: Normal.     Plantar plates: Intersesamoidal ligament and sesamoidal phalangeal  ligaments of the first metatarsophalangeal joints are intact. Plantar  plates of the second through fifth toe at metatarsophalangeal joints  are grossly intact.    Intermetatarsal spaces: No interdigital neuroma. No significant  intermetatarsal bursitis.    Ligaments and Tendons    Lisfranc interosseous ligament: Intact.    ANCILLARY FINDINGS    Significant edema this in the plantar musculature consistent this  sequela of microangiopathy or neuropathy.      Impression    Impression:    1.Deep to marker placed over the distal lateral fibula there is a soft  tissue defect that extends to the periosteum of the bone. Associated  edema within the distal fibula with very subtle early marrow  replacement on T1-weighted images about the far distal fibula tip  laterally. These findings are most consistent with distal fibula  osteitis with very early osteomyelitis.   2. Significant soft tissue swelling proximal and distal to the fibula  tip this replacement of normal subcutaneous fat, consistent with  cellulitis.  3. Cellulitis of the soft tissues at the dorsal aspect of the second  through fifth metatarsals with the soft tissue wound abutting the  extensor and the periosteum of the fifth metatarsal head without  evidence of osteomyelitis in this area.  4. Nonspecific edema within the plantar musculature can be seen in the  context of diabetic microangiopathy or neuropathy.    JUTTA ELLERMANN, MD         SYSTEM ID:  [id number]   MR Foot Left w/o & w Contrast    Narrative    MR   left  ankle without  and foot with intravenous contrast 8/4/2021  3:50 PM    History: assess for fibular osteomyelitis    Techniques: Multiplanar multisequence imaging of the left ankle was  obtained without  administration of intra-articular or  intravenous  contrast and the left foot before and after administration of contrast  agent.    Comparison: Foot radiograph dated 7/16/2016    Findings:    A marker is placed over the distal lateral fibula. There is a soft  tissue defect that extends to the periosteum of the bone. Significant  edema is noted surrounding the distal fibula. There is also edema  within the distal fibula and periosteal reaction (series 8, image 20).  On the T1-weighted images, very subtle early marrow replacement about  the distal fibula tip laterally just proximal to a osseous fragment is  noted. These findings are most consistent with distal fibula osteitis  with very early osteomyelitis.     Associated soft tissue edema and replacement of normal fatty  subcutaneous tissues, consistent this cellulitis extends approximately  6 cm proximal to the distal fibula and 3 cm distal to the distal  fibula surrounding the peroneal tendons, there is no tendinous  involvement.    In the foot there is an additional soft tissue defect overlying the  second through fifth metatarsals. Associated cellulitis and soft  tissue swelling is noted. The soft tissue defect abuts the distal  fifth metatarsal and envelops the extensor tendon without obvious  involvement of the bone.     Achilles tendon: Intact.    Plantar fascia: Normal.     ANTERIOR COMPARTMENT    Tendons: Anterior extensor tendons are intact.    JOINTS    Small ankle joint effusion.    GENERAL FINDINGS    Bones: Posttraumatic changes with osseous fragment distal to the  fibula and medial malleolus. Fracture deformity of the fourth  metatarsal. Degenerative changes of the purpose for this and third  tarsometatarsal articulation. No acute fracture, marrow contusion. No  talar dome osteochondral lesion.    Muscles: Normal.    Tarsal tunnel: Normal.     Plantar plates: Intersesamoidal ligament and sesamoidal phalangeal  ligaments of the first metatarsophalangeal joints are intact. Plantar  plates  of the second through fifth toe at metatarsophalangeal joints  are grossly intact.    Intermetatarsal spaces: No interdigital neuroma. No significant  intermetatarsal bursitis.    Ligaments and Tendons    Lisfranc interosseous ligament: Intact.    ANCILLARY FINDINGS    Significant edema this in the plantar musculature consistent this  sequela of microangiopathy or neuropathy.      Impression    Impression:    1.Deep to marker placed over the distal lateral fibula there is a soft  tissue defect that extends to the periosteum of the bone. Associated  edema within the distal fibula with very subtle early marrow  replacement on T1-weighted images about the far distal fibula tip  laterally. These findings are most consistent with distal fibula  osteitis with very early osteomyelitis.   2. Significant soft tissue swelling proximal and distal to the fibula  tip this replacement of normal subcutaneous fat, consistent with  cellulitis.  3. Cellulitis of the soft tissues at the dorsal aspect of the second  through fifth metatarsals with the soft tissue wound abutting the  extensor and the periosteum of the fifth metatarsal head without  evidence of osteomyelitis in this area.  4. Nonspecific edema within the plantar musculature can be seen in the  context of diabetic microangiopathy or neuropathy.    JUTTA ELLERMANN, MD         SYSTEM ID:  [id number]   XR Eye Foreign Body    Narrative    XR EYE FOREIGN BODY 8/4/2021 2:09 PM    INDICATION: History of foreign body in eye.  X-ray prior to MRI;  History of foreign body in eye  COMPARISON: None.      Impression    IMPRESSION: No metallic intraorbital foreign body within either orbit.    BISMARK MACK MD         SYSTEM ID:  [id number]     Consultations:   ID  Podiatry    Code Status:   Full    Allergies:   No Known Allergies     Subjective:   Patient reports pain is controlled.  Denies chest pain, shortness of breath, abdominal pain.  He thinks the redness and swelling of the  "LLE have improved since yesterday.    Physical Exam:   Blood pressure 135/64, pulse 78, temperature 98.4  F (36.9  C), temperature source Oral, resp. rate 16, height 1.778 m (5' 10\"), weight 100.3 kg (221 lb 1.6 oz), SpO2 98 %.  General: Alert, interactive, NAD, lying in bed  HEENT: AT/NC  Resp: clear to auscultation bilaterally, no crackles or wheezes  Cardiac: regular rate and rhythm, no murmur  Abdomen: Soft, nontender, nondistended. +BS  Extremities: LLE anterior shin to the foot with erythema today receeding from the previously marked borders. no exceeding outlined borders.  Left lateral malleolus dressing in place. Left lateral foot with chronic appearing dry ulceration which does not appear infected.    Neuro: Alert & oriented x 3, Cns 2-12 intact, moves all extremities equally   Discharge Medicatios:        Current Discharge Medication List      CONTINUE these medications which have NOT CHANGED    Details   metFORMIN (GLUCOPHAGE) 500 MG tablet Take 1,000 mg by mouth 2 times daily (with meals)             Instructions Given to Patient as Discharge:     Discharge Procedure Orders   Reason for your hospital stay   Order Comments: You were hospitalized due to a left lower leg wound.  You were found to have poor circulation in your arteries and signs of early osteomyelitis.  Podiatry was consulted and recommended transfer to [facility] for a Vascular Surgery consultation prior to proceeding with Podiatry surgical intervention.     Full Code     Order Specific Question Answer Comments   Code status determined by: Discussion with patient/ legal decision maker        Pending Tests at Discharge:   none    Discharge Disposition:   Transferred to [facility]     Viv Pacheco MS, PA-C  Hospitalist Service  Pager [phone]    >30 minutes was spent in discharge planning, care coordination, physical examination and medication reconciliation on the date of discharge, 8/5/2021    "

## 2021-08-06 ENCOUNTER — APPOINTMENT (OUTPATIENT)
Dept: INTERVENTIONAL RADIOLOGY/VASCULAR | Facility: CLINIC | Age: 71
DRG: 623 | End: 2021-08-06
Attending: STUDENT IN AN ORGANIZED HEALTH CARE EDUCATION/TRAINING PROGRAM
Payer: MEDICARE

## 2021-08-06 PROBLEM — E11.42 DIABETIC POLYNEUROPATHY ASSOCIATED WITH TYPE 2 DIABETES MELLITUS (H): Status: ACTIVE | Noted: 2021-08-05

## 2021-08-06 PROBLEM — L97.324: Status: ACTIVE | Noted: 2021-08-05

## 2021-08-06 LAB
GLUCOSE BLDC GLUCOMTR-MCNC: 141 MG/DL (ref 70–99)
GLUCOSE BLDC GLUCOMTR-MCNC: 157 MG/DL (ref 70–99)
GLUCOSE BLDC GLUCOMTR-MCNC: 186 MG/DL (ref 70–99)
GLUCOSE BLDC GLUCOMTR-MCNC: 192 MG/DL (ref 70–99)
GLUCOSE BLDC GLUCOMTR-MCNC: 193 MG/DL (ref 70–99)

## 2021-08-06 PROCEDURE — 250N000011 HC RX IP 250 OP 636: Performed by: STUDENT IN AN ORGANIZED HEALTH CARE EDUCATION/TRAINING PROGRAM

## 2021-08-06 PROCEDURE — C1760 CLOSURE DEV, VASC: HCPCS

## 2021-08-06 PROCEDURE — 120N000001 HC R&B MED SURG/OB

## 2021-08-06 PROCEDURE — C1769 GUIDE WIRE: HCPCS

## 2021-08-06 PROCEDURE — 99232 SBSQ HOSP IP/OBS MODERATE 35: CPT | Performed by: PHYSICIAN ASSISTANT

## 2021-08-06 PROCEDURE — 99207 PR CDG-CODE CATEGORY CHANGED: CPT | Performed by: PHYSICIAN ASSISTANT

## 2021-08-06 PROCEDURE — 99153 MOD SED SAME PHYS/QHP EA: CPT

## 2021-08-06 PROCEDURE — 37186 SEC ART THROMBECTOMY ADD-ON: CPT | Mod: LT | Performed by: SURGERY

## 2021-08-06 PROCEDURE — 75710 ARTERY X-RAYS ARM/LEG: CPT | Mod: 26 | Performed by: SURGERY

## 2021-08-06 PROCEDURE — 272N000567 HC SHEATH CR4

## 2021-08-06 PROCEDURE — 250N000009 HC RX 250: Performed by: SURGERY

## 2021-08-06 PROCEDURE — 255N000002 HC RX 255 OP 636: Performed by: SURGERY

## 2021-08-06 PROCEDURE — 04CQ3ZZ EXTIRPATION OF MATTER FROM LEFT ANTERIOR TIBIAL ARTERY, PERCUTANEOUS APPROACH: ICD-10-PCS | Performed by: SURGERY

## 2021-08-06 PROCEDURE — 99232 SBSQ HOSP IP/OBS MODERATE 35: CPT | Mod: 57 | Performed by: PODIATRIST

## 2021-08-06 PROCEDURE — 37184 PRIM ART M-THRMBC 1ST VSL: CPT

## 2021-08-06 PROCEDURE — 99222 1ST HOSP IP/OBS MODERATE 55: CPT | Mod: 25 | Performed by: SURGERY

## 2021-08-06 PROCEDURE — 272N000116 HC CATH CR1

## 2021-08-06 PROCEDURE — 258N000003 HC RX IP 258 OP 636: Performed by: PHYSICIAN ASSISTANT

## 2021-08-06 PROCEDURE — C1757 CATH, THROMBECTOMY/EMBOLECT: HCPCS

## 2021-08-06 PROCEDURE — 272N000302 HC DEVICE INFLATION CR5

## 2021-08-06 PROCEDURE — 272N000124 HC CATH CR11

## 2021-08-06 PROCEDURE — C1725 CATH, TRANSLUMIN NON-LASER: HCPCS

## 2021-08-06 PROCEDURE — 76937 US GUIDE VASCULAR ACCESS: CPT | Mod: 26 | Performed by: SURGERY

## 2021-08-06 PROCEDURE — 272N000196 HC ACCESSORY CR5

## 2021-08-06 PROCEDURE — 272N000190 HC ACCESSORY CR14

## 2021-08-06 PROCEDURE — 250N000012 HC RX MED GY IP 250 OP 636 PS 637: Performed by: PHYSICIAN ASSISTANT

## 2021-08-06 PROCEDURE — 272N000570 HC SHEATH CR7

## 2021-08-06 PROCEDURE — 99152 MOD SED SAME PHYS/QHP 5/>YRS: CPT | Performed by: SURGERY

## 2021-08-06 PROCEDURE — 250N000011 HC RX IP 250 OP 636: Performed by: SURGERY

## 2021-08-06 PROCEDURE — 250N000013 HC RX MED GY IP 250 OP 250 PS 637: Performed by: PHYSICIAN ASSISTANT

## 2021-08-06 PROCEDURE — 047L3Z1 DILATION OF LEFT FEMORAL ARTERY USING DRUG-COATED BALLOON, PERCUTANEOUS APPROACH: ICD-10-PCS | Performed by: SURGERY

## 2021-08-06 PROCEDURE — C2623 CATH, TRANSLUMIN, DRUG-COAT: HCPCS

## 2021-08-06 PROCEDURE — 250N000011 HC RX IP 250 OP 636: Performed by: PHYSICIAN ASSISTANT

## 2021-08-06 PROCEDURE — 272N000197 HC ACCESSORY CR6

## 2021-08-06 PROCEDURE — 047Q3ZZ DILATION OF LEFT ANTERIOR TIBIAL ARTERY, PERCUTANEOUS APPROACH: ICD-10-PCS | Performed by: SURGERY

## 2021-08-06 PROCEDURE — 37224 IR LOWER EXTREMITY ANGIOGRAM LEFT: CPT | Mod: LT | Performed by: SURGERY

## 2021-08-06 PROCEDURE — 36246 INS CATH ABD/L-EXT ART 2ND: CPT

## 2021-08-06 PROCEDURE — 37228 PR REVASC TIB/PERON ART, ANGIOPLASTY INIT VESSEL: CPT | Mod: LT | Performed by: SURGERY

## 2021-08-06 PROCEDURE — 250N000013 HC RX MED GY IP 250 OP 250 PS 637: Performed by: STUDENT IN AN ORGANIZED HEALTH CARE EDUCATION/TRAINING PROGRAM

## 2021-08-06 PROCEDURE — 047N3Z1 DILATION OF LEFT POPLITEAL ARTERY USING DRUG-COATED BALLOON, PERCUTANEOUS APPROACH: ICD-10-PCS | Performed by: SURGERY

## 2021-08-06 RX ORDER — NALOXONE HYDROCHLORIDE 0.4 MG/ML
0.2 INJECTION, SOLUTION INTRAMUSCULAR; INTRAVENOUS; SUBCUTANEOUS
Status: DISCONTINUED | OUTPATIENT
Start: 2021-08-06 | End: 2021-08-06 | Stop reason: HOSPADM

## 2021-08-06 RX ORDER — FENTANYL CITRATE 50 UG/ML
25-50 INJECTION, SOLUTION INTRAMUSCULAR; INTRAVENOUS EVERY 5 MIN PRN
Status: DISCONTINUED | OUTPATIENT
Start: 2021-08-06 | End: 2021-08-06 | Stop reason: HOSPADM

## 2021-08-06 RX ORDER — IODIXANOL 320 MG/ML
150 INJECTION, SOLUTION INTRAVASCULAR ONCE
Status: COMPLETED | OUTPATIENT
Start: 2021-08-06 | End: 2021-08-06

## 2021-08-06 RX ORDER — HEPARIN SODIUM 1000 [USP'U]/ML
4000 INJECTION, SOLUTION INTRAVENOUS; SUBCUTANEOUS ONCE
Status: COMPLETED | OUTPATIENT
Start: 2021-08-06 | End: 2021-08-06

## 2021-08-06 RX ORDER — CLOPIDOGREL BISULFATE 75 MG/1
75 TABLET ORAL DAILY
Status: DISCONTINUED | OUTPATIENT
Start: 2021-08-07 | End: 2021-08-11 | Stop reason: HOSPADM

## 2021-08-06 RX ORDER — HEPARIN SODIUM 200 [USP'U]/100ML
1 INJECTION, SOLUTION INTRAVENOUS CONTINUOUS PRN
Status: DISCONTINUED | OUTPATIENT
Start: 2021-08-06 | End: 2021-08-06 | Stop reason: HOSPADM

## 2021-08-06 RX ORDER — NALOXONE HYDROCHLORIDE 0.4 MG/ML
0.4 INJECTION, SOLUTION INTRAMUSCULAR; INTRAVENOUS; SUBCUTANEOUS
Status: DISCONTINUED | OUTPATIENT
Start: 2021-08-06 | End: 2021-08-06 | Stop reason: HOSPADM

## 2021-08-06 RX ORDER — PROTAMINE SULFATE 10 MG/ML
30 INJECTION, SOLUTION INTRAVENOUS ONCE
Status: COMPLETED | OUTPATIENT
Start: 2021-08-06 | End: 2021-08-06

## 2021-08-06 RX ORDER — HEPARIN SODIUM 1000 [USP'U]/ML
2000 INJECTION, SOLUTION INTRAVENOUS; SUBCUTANEOUS ONCE
Status: COMPLETED | OUTPATIENT
Start: 2021-08-06 | End: 2021-08-06

## 2021-08-06 RX ORDER — HYDRALAZINE HYDROCHLORIDE 20 MG/ML
10 INJECTION INTRAMUSCULAR; INTRAVENOUS EVERY 4 HOURS PRN
Status: DISCONTINUED | OUTPATIENT
Start: 2021-08-06 | End: 2021-08-11 | Stop reason: HOSPADM

## 2021-08-06 RX ORDER — ASPIRIN 81 MG/1
81 TABLET ORAL DAILY
Status: DISCONTINUED | OUTPATIENT
Start: 2021-08-06 | End: 2021-08-11 | Stop reason: HOSPADM

## 2021-08-06 RX ORDER — SODIUM CHLORIDE 9 MG/ML
INJECTION, SOLUTION INTRAVENOUS CONTINUOUS
Status: CANCELLED | OUTPATIENT
Start: 2021-08-06

## 2021-08-06 RX ORDER — FLUMAZENIL 0.1 MG/ML
0.2 INJECTION, SOLUTION INTRAVENOUS
Status: DISCONTINUED | OUTPATIENT
Start: 2021-08-06 | End: 2021-08-06 | Stop reason: HOSPADM

## 2021-08-06 RX ORDER — CLOPIDOGREL BISULFATE 75 MG/1
300 TABLET ORAL ONCE
Status: COMPLETED | OUTPATIENT
Start: 2021-08-06 | End: 2021-08-06

## 2021-08-06 RX ORDER — LIDOCAINE 40 MG/G
CREAM TOPICAL
Status: CANCELLED | OUTPATIENT
Start: 2021-08-06

## 2021-08-06 RX ORDER — ATORVASTATIN CALCIUM 40 MG/1
40 TABLET, FILM COATED ORAL EVERY EVENING
Status: DISCONTINUED | OUTPATIENT
Start: 2021-08-06 | End: 2021-08-11 | Stop reason: HOSPADM

## 2021-08-06 RX ADMIN — SODIUM CHLORIDE: 9 INJECTION, SOLUTION INTRAVENOUS at 17:26

## 2021-08-06 RX ADMIN — MIDAZOLAM HYDROCHLORIDE 1 MG: 1 INJECTION, SOLUTION INTRAMUSCULAR; INTRAVENOUS at 10:12

## 2021-08-06 RX ADMIN — CLOPIDOGREL BISULFATE 300 MG: 75 TABLET ORAL at 13:35

## 2021-08-06 RX ADMIN — PROTAMINE SULFATE 30 MG: 10 INJECTION, SOLUTION INTRAVENOUS at 12:01

## 2021-08-06 RX ADMIN — HEPARIN SODIUM 4000 UNITS: 1000 INJECTION INTRAVENOUS; SUBCUTANEOUS at 10:27

## 2021-08-06 RX ADMIN — FENTANYL CITRATE 50 MCG: 50 INJECTION, SOLUTION INTRAMUSCULAR; INTRAVENOUS at 10:30

## 2021-08-06 RX ADMIN — HEPARIN SODIUM 4000 UNITS: 1000 INJECTION INTRAVENOUS; SUBCUTANEOUS at 10:36

## 2021-08-06 RX ADMIN — HEPARIN SODIUM 2000 UNITS: 1000 INJECTION INTRAVENOUS; SUBCUTANEOUS at 11:51

## 2021-08-06 RX ADMIN — ASPIRIN 81 MG: 81 TABLET, COATED ORAL at 08:53

## 2021-08-06 RX ADMIN — PIPERACILLIN SODIUM AND TAZOBACTAM SODIUM 3.38 G: 3; .375 INJECTION, POWDER, LYOPHILIZED, FOR SOLUTION INTRAVENOUS at 20:12

## 2021-08-06 RX ADMIN — FENTANYL CITRATE 50 MCG: 50 INJECTION, SOLUTION INTRAMUSCULAR; INTRAVENOUS at 10:46

## 2021-08-06 RX ADMIN — FENTANYL CITRATE 50 MCG: 50 INJECTION, SOLUTION INTRAMUSCULAR; INTRAVENOUS at 10:12

## 2021-08-06 RX ADMIN — PIPERACILLIN SODIUM AND TAZOBACTAM SODIUM 3.38 G: 3; .375 INJECTION, POWDER, LYOPHILIZED, FOR SOLUTION INTRAVENOUS at 02:39

## 2021-08-06 RX ADMIN — FENTANYL CITRATE 50 MCG: 50 INJECTION, SOLUTION INTRAMUSCULAR; INTRAVENOUS at 10:20

## 2021-08-06 RX ADMIN — IODIXANOL 165 ML: 320 INJECTION, SOLUTION INTRAVASCULAR at 12:02

## 2021-08-06 RX ADMIN — MIDAZOLAM HYDROCHLORIDE 1 MG: 1 INJECTION, SOLUTION INTRAMUSCULAR; INTRAVENOUS at 10:30

## 2021-08-06 RX ADMIN — FENTANYL CITRATE 50 MCG: 50 INJECTION, SOLUTION INTRAMUSCULAR; INTRAVENOUS at 11:30

## 2021-08-06 RX ADMIN — HEPARIN SODIUM 3 BAG: 200 INJECTION, SOLUTION INTRAVENOUS at 10:12

## 2021-08-06 RX ADMIN — FENTANYL CITRATE 50 MCG: 50 INJECTION, SOLUTION INTRAMUSCULAR; INTRAVENOUS at 11:46

## 2021-08-06 RX ADMIN — MIDAZOLAM HYDROCHLORIDE 1 MG: 1 INJECTION, SOLUTION INTRAMUSCULAR; INTRAVENOUS at 10:20

## 2021-08-06 RX ADMIN — INSULIN ASPART 1 UNITS: 100 INJECTION, SOLUTION INTRAVENOUS; SUBCUTANEOUS at 17:31

## 2021-08-06 RX ADMIN — PIPERACILLIN SODIUM AND TAZOBACTAM SODIUM 3.38 G: 3; .375 INJECTION, POWDER, LYOPHILIZED, FOR SOLUTION INTRAVENOUS at 14:15

## 2021-08-06 RX ADMIN — MIDAZOLAM HYDROCHLORIDE 1 MG: 1 INJECTION, SOLUTION INTRAMUSCULAR; INTRAVENOUS at 10:47

## 2021-08-06 RX ADMIN — LIDOCAINE HYDROCHLORIDE 10 ML: 10 INJECTION, SOLUTION INFILTRATION; PERINEURAL at 10:23

## 2021-08-06 RX ADMIN — PIPERACILLIN SODIUM AND TAZOBACTAM SODIUM 3.38 G: 3; .375 INJECTION, POWDER, LYOPHILIZED, FOR SOLUTION INTRAVENOUS at 07:03

## 2021-08-06 ASSESSMENT — ACTIVITIES OF DAILY LIVING (ADL)
ADLS_ACUITY_SCORE: 18
ADLS_ACUITY_SCORE: 17
ADLS_ACUITY_SCORE: 17
ADLS_ACUITY_SCORE: 18
ADLS_ACUITY_SCORE: 17

## 2021-08-06 NOTE — CONSULTS
V[facility] SURGERY HOSP[facility] PATIENT CONSULTATION NOTE  Consulted by: Hospitalist  Reason for consultation: PAD with left ankle wound    HPI:  Rangel Yang is a 71 year old year old male who has a PMH significant for diabetes with peripheral neuropathy, hyperlipidemia presented with increasing swelling and pain of his left ankle after falling from a ladder 1.5 weeks ago.  MRI imaging concerning for osteomyelitis.  ABIs show right 0.88 left 0.59 (digit 24 mmHg and adequate for wound healing) and PVRs indicating infrapopliteal disease.  History of 5th toe amp in the past due to diabetic foot infection in the past.    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    resting bedside ABIs of 0.9 bilaterally; ocurred in association with  infection after agressive toenail cutting which the pt did himself     Hyperlipidemia LDL goal < 70 4-2010    tg > 700 at dx     Peripheral neuropathy     secondary to previously unrecognized DM2     Type II or unspecified type diabetes mellitus without mention of complication, uncontrolled 4-2010    very angry about his dx       PAST SURGICAL HISTORY:  Past Surgical History:   Procedure Laterality Date     TONSILLECTOMY & ADENOIDECTOMY       [facility] NONSPECIFIC PROCEDURE  4-    Open left 5th toe amputation, Dr. Hussein Engle     [facility] NONSPECIFIC PROCEDURE  4-    Full thickness subcutaneous debridement of left open 5th ray amputation site 04/19/2010, Dr. Hussein Engle.     [facility] NONSPECIFIC PROCEDURE   4-    Completion 5th ray metatarsal amputation with wound closure and full thickness subcutaneous debridement, Dr. Hussein Engle 04/21/2010.     [facility] NONSPECIFIC PROCEDURE  2008    varicose vein surgery of the left lower extremity.       FAMILY HISTORY:  Family History   Problem Relation Age of Onset     C.A.D. Father         Father with coronary artery disease in his 40s.     Diabetes Mother        SOCIAL HISTORY:   Social History     Tobacco Use     Smoking status: Former Smoker   Substance Use Topics     Alcohol use: Yes     Comment: occ     HOME MEDICATIONS:  Prior to Admission medications    Medication Sig Start Date End Date Taking? Authorizing Provider   metFORMIN (GLUCOPHAGE) 500 MG tablet Take 1,000 mg by mouth 2 times daily (with meals)   Yes Reported, Patient       VITAL SIGNS:  /53 (BP Location: Right arm)   Pulse 72   Temp (!) 96.4  F (35.8  C) (Oral)   Resp 16   SpO2 97%   No intake or output data in the 24 hours ending 08/06/21 0714    Labs:  ROUTINE IP LABS (Last four results)  BMP  Recent Labs   Lab 08/06/21  0213 08/05/21  2121 08/05/21  1114 08/05/21  0648 08/05/21  0511 08/03/21  1920   NA  --   --   --   --  136 136   POTASSIUM  --   --   --   --  4.3 3.9   CHLORIDE  --   --   --   --  107 104   SHEILA  --   --   --   --  8.5 8.8   CO2  --   --   --   --  26 25   BUN  --   --   --   --  18 27   CR  --   --   --   --  0.97 1.16   * 239* 188* 193* 199* 182*     CBC  Recent Labs   Lab 08/05/21  0511 08/03/21  1920   WBC 10.3 12.9*   RBC 4.17* 4.35*   HGB 12.4* 13.1*   HCT 38.6* 39.9*   MCV 93 92   MCH 29.7 30.1   MCHC 32.1 32.8   RDW 13.5 13.4    209     INRNo lab results found in last 7 days.    PHYSICAL EXAM:  Constitutional: healthy, alert, no acute distress and cooperative   Cardiovascular: RRR  Respiratory: CTAB anteriorly, breathing unlabored without secondary muscle use  Psychiatric: mentation appears normal and affect normal/bright  Neck: no asymmetry  GI/Abdomen:  +BS, abdomen soft, non-tender. No masses, no CVAT  MSK: able to move all extremities without weakness or ataxia  Extremities: See picture below, palpable 2+ femoral pulses, nonpalpable left popliteal pulse, palpable right popliteal pulse, monophasic distal signals  Hematology: no bruising on visible skin    IMAGING:  ABIs show right 0.88 left 0.59 (digit 24 mmHg and adequate for wound healing) and PVRs indicating infrapopliteal disease.    Patient Active Problem List   Diagnosis     CARDIOVASCULAR SCREENING; LDL GOAL LESS THAN 100     Cellulitis of left ankle     Osteomyelitis (H)       ASSESSMENT:   71 year old year old male who has a PMH significant for diabetes with peripheral neuropathy, hyperlipidemia presented with increasing swelling and pain of his left ankle after falling from a ladder 1.5 weeks ago.  MRI imaging concerning for osteomyelitis.  ABIs show right 0.88 left 0.59 (digit 24 mmHg and adequate for wound healing) and PVRs indicating infrapopliteal disease.     PLAN:  Continue n.p.o.   Right lower extremity angiogram this morning  ASA started, start statin when able    Discussed w/ Dr. Champion.    Eliana Kinney MD  Vascular Surgery Fellow    Vascular surgery attending staff note: I have seen and examined the patient. This is a 71-year-old male with infection of the distal fibula nonhealing wound. We will proceed with arteriogram and intervention for limb salvage.    TAD CHAMPION M.D.

## 2021-08-06 NOTE — PLAN OF CARE
[id number]    A/Ox4, hypertensive. C/o 5/10 L ankle pain, prn oxy given x1. L ankle wound cleansed with microklenz, mepilex placed. IVF infusing with int abx. Sliding scale insulin per orders. Up SBA. NPO@ midnight for vascular consult tomorrow, continue to monitor.

## 2021-08-06 NOTE — PROGRESS NOTES
Vascular surgery brief procedure note    S/p left lower extremity angiogram via right groin approach with distal SFA angioplasty with 6 mm DCB and AT angioplasty with 3mm balloon complicated by distal embolization requiring penumbra CAT 3 thrombectomy and 2 mm distal AT/DP angioplasty    Plan:  -Bedrest for 2 hours  -Plavix 300 mg postop, 75 mg/day for 3 months  -ASA and start statin  -Proceed with Podiatry intervention

## 2021-08-06 NOTE — PLAN OF CARE
A/Ox4. VSS ex hypertensive, on RA. Denies pain. Up ind. L ankle wound covered with mepilex. Redness marked. Consistent carb diet with BGM. PIV infusing IVF with int abx. Angiogram via right groin done, Tegaderm over groin site, CDI. CMS intact. Plan for I&D and bone resection of fibula tomorrow at 8:30AM. NPO at MN.

## 2021-08-06 NOTE — IR NOTE
Interventional Radiology Intra-procedural Nursing Note    Patient Name: Rangel Yang  Medical Record Number: [id number]  Today's Date: August 6, 2021    Procedure: Left lower extremity arteriogram with possible intervention under intravenous moderate sedation.  Start time: 1022  End time: 1208  Report provided to: Station 88 RN  Patient depart time and location: 1216 to Station 88    Note: Patient entered Interventional Radiology Suite number two via cart. Patient awake, alert and orientated. Assisted onto procedural table in supine position. Prepped and draped.  Dr. Champion in room. Time out and procedure started. Blood pressure elevated throughout the case. Telemetry reading sinus rhythm.    Procedure well tolerated by patient. Procedure end with debrief by Dr. Champion.    Administered medication totals:  Lidocaine 1% 10 mL Intradermal  Heparin 10,000 Units IVP  Protamine 30 mg IVP  Versed 4 mg IVP  Fentanyl 300 mcg IVP    Last dose of sedation administered at 1146.

## 2021-08-06 NOTE — PRE-PROCEDURE
GENERAL PRE-PROCEDURE:   Procedure:  LEFT LOWER EXTREMITY ARTERIOGRAM WITH INTERVENTION     Verbal consent obtained?: Yes    Written consent obtained?: Yes    Risks and benefits: Risks, benefits and alternatives were discussed    Consent given by:  Patient  Patient states understanding of procedure being performed: Yes    Patient's understanding of procedure matches consent: Yes    Procedure consent matches procedure scheduled: Yes    Expected level of sedation:  Moderate  Appropriately NPO:  Yes  Mallampati  :  Grade 2- soft palate, base of uvula, tonsillar pillars, and portion of posterior pharyngeal wall visible  Lungs:  Lungs clear with good breath sounds bilaterally  Heart:  Normal heart sounds and rate  History & Physical reviewed:  History and physical reviewed and no updates needed  Statement of review:  I have reviewed the lab findings, diagnostic data, medications, and the plan for sedation

## 2021-08-06 NOTE — PROVIDER NOTIFICATION
MD Notification    Person notified:Hospitalist    Person Name:Ramandeep Short    Date/Time:8/6/21@12:31 PM    Interaction:web page    Purpose of Notification:Patient back from IR, can you update diet order? Thanks.    Orders Received:    Comments:

## 2021-08-06 NOTE — PLAN OF CARE
A&Ox4. VSS on RA. C/o minor ankle pain, declined medication. Up SBA. L ankle wound covered w/ Mepilex, CDI. Redness marked on L leg, within borders. Tolerated diet, NPO since MN. PIV infuing NS @ 100 w/ int abx. Plan for vascular consult and possible procedure today. Slept between cares.

## 2021-08-06 NOTE — PROGRESS NOTES
[facility]    Infectious Disease Progress Note    Date of Service (when I saw the patient): 08/06/2021     Assessment & Plan   Rangel Yang is a 71 year old male who was admitted on 8/5/2021.     IMPRESSION:     1.  A 71-year-old male with underlying diabetes and probably peripheral vascular disease, prior recurrent left foot problems, now with recent traumatic lateral foot wound and cellulitis without major clinical sepsis.  MRI scan shows the wound tracks deep to bone, but unlikely by history to be osteomyelitis.  2.  Prior significant left lateral foot wound distal to the current area treated in [place] with extensive antibiotics and I and D.  Imaging without obvious infection at that site.  3.  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  4.  Diabetes mellitus.  5.  s/p S/p left lower extremity angiogram via right groin approach with distal SFA angioplasty with 6 mm DCB and AT angioplasty with 3mm balloon complicated by distal embolization requiring penumbra CAT 3 thrombectomy and 2 mm distal AT/DP angioplasty     RECOMMENDATIONS:     1. Stop vancomycin and continue on zosyn alone, reassess as cultures are reported.   2.  Await Podiatry plan.  Aggressive approach here would be to biopsy the distal bone, make sure there is not actual osteomyelitis present.  If present, some amount of I and D and extended antibiotics.  If no deep infection, probably oral antibiotics here will be acceptable if he improves.  3.  Probably significant peripheral vascular disease workup underway.    Thomas Moreno MD    Interval History   S/p above mentioned procedure   Afebrile   No new complaints   hungry wants to eat     Physical Exam   Temp: (!) 96.4  F (35.8  C) Temp src: Oral BP: (!) 163/81 Pulse: 67   Resp: 12 SpO2: 100 % O2 Device: None (Room air) Oxygen Delivery: 2 LPM  There were no vitals filed for this visit.  Vital Signs with Ranges  Temp:  [96.2  F (35.7  C)-99  F (37.2  C)] 96.4  F  (35.8  C)  Pulse:  [65-77] 67  Resp:  [12-24] 12  BP: (132-185)/(53-88) 163/81  SpO2:  [94 %-100 %] 100 %    Constitutional: Awake, alert, cooperative, no apparent distress  Lungs: Clear to auscultation bilaterally, no crackles or wheezing  Cardiovascular: Regular rate and rhythm, normal S1 and S2, and no murmur noted  Abdomen: Normal bowel sounds, soft, non-distended, non-tender  Skin: No rashes, no cyanosis, no edema  Other:    Medications     heparin 2 units/mL in 0.9% NaCl TABLE SOLN       heparin 2 units/mL in 0.9% NaCl TABLE SOLN       heparin 2 units/mL in 0.9% NaCl TABLE SOLN       heparin 2 units/mL in 0.9% NaCl TABLE SOLN       sodium chloride Stopped (08/06/21 1005)       aspirin  81 mg Oral Daily     insulin aspart  1-7 Units Subcutaneous TID AC     insulin aspart  1-5 Units Subcutaneous At Bedtime     iodixanol  150 mL Arterial Once     piperacillin-tazobactam  3.375 g Intravenous Q6H     protamine  30 mg Intravenous Once     sodium chloride (PF)  3 mL Intracatheter Q8H     vancomycin  1,750 mg Intravenous Q24H       Data   All microbiology laboratory data reviewed.  Recent Labs   Lab Test 08/05/21  0511 08/03/21  1920   WBC 10.3 12.9*   HGB 12.4* 13.1*   HCT 38.6* 39.9*   MCV 93 92    209     Recent Labs   Lab Test 08/05/21  0511 08/03/21  1920   CR 0.97 1.16     Recent Labs   Lab Test 08/04/21  0753   SED 14     No lab results found.    Invalid input(s): BEBE

## 2021-08-06 NOTE — CONSULTS
Podiatry / Foot and Ankle Surgery Progress Note    August 6, 2021    Subject: Patient was seen at bedside.  Laying flat after angiogram. Daughter at bedside.     Objective:  Vitals: BP (!) 144/74 (BP Location: Right arm)   Pulse 69   Temp 97.4  F (36.3  C) (Oral)   Resp 18   SpO2 97%   BMI= There is no height or weight on file to calculate BMI.    HA1C: 7.6     C-reactive protein:  122  ESR: --: 14     General appearance: Patient is alert and fully cooperative with history & exam.  No sign of distress is noted during the visit.      Psychiatric: Affect is pleasant & appropriate.  Patient appears motivated to improve health.       Respiratory: Breathing is regular & unlabored while sitting.      HEENT: Hearing is intact to spoken word.  Speech is clear.  No gross evidence of visual impairment that would impact ambulation.       Dermatologic:  Full thickness ulceration to the lateral left ankle over distal fibula. Measures apprpoximately 1.4cm x 1.5cm x 0.4cm. does not probe to bone but base of wound is fibrous. Subcutaneous tissue expposed. Lightly malodorous. No purulent drainage noted. Redness up left leg is somewhat resolving from lines previously drawn.      Vascular: DP & PT pulses are not palpable bilaterally.  No significant edema or varicosities noted.  CFT and skin temperature is normal to both lower extremities.     Neurologic: Lower extremity sensation is diminished to feet.      Musculoskeletal: Patient is ambulatory without assistive device or brace. Previously left 5th toe amputation.      IMAGING: left ankle xray - I personally reviewed images - Bimalleolar soft tissue swelling. Soft tissue defect over lateral malleolus, may reflect ulcer provided history. No radiographic evidence of osteomyelitis. Atherosclerotic calcifications.     MRI left ankle and foot:    1.Deep to marker placed over the distal lateral fibula there is a soft  tissue defect that extends to the periosteum of the bone.  Associated  edema within the distal fibula with very subtle early marrow  replacement on T1-weighted images about the far distal fibula tip  laterally. These findings are most consistent with distal fibula  osteitis with very early osteomyelitis.   2. Significant soft tissue swelling proximal and distal to the fibula  tip this replacement of normal subcutaneous fat, consistent with  cellulitis.  3. Cellulitis of the soft tissues at the dorsal aspect of the second  through fifth metatarsals with the soft tissue wound abutting the  extensor and the periosteum of the fifth metatarsal head without  evidence of osteomyelitis in this area.  4. Nonspecific edema within the plantar musculature can be seen in the  context of diabetic microangiopathy or neuropathy.     MIYA:  Waveforms: Triphasic throughout the right lower extremity. Biphasic in  the left femoral and popliteal region. Monophasic in the distal left  tibial arteries. Dampened digital waveforms in the left foot.                                                                      IMPRESSION:  1. Ankle-brachial index in the right lower extremity indicate moderate  arterial insufficiency.  2. Ankle-brachial index in the left lower extremity indicates moderate  to severe arterial insufficiency. Left digital brachial index would  indicate severe arterial insufficiency in the left foot.     ASSESSMENT: 71 yr old diabetic male with PAD, ulcer left ankle, cellulitis, osteomyelitis of distal fibula.     Plan:    -Had a long discussion with patient about his MRI results. Explained that it is showing early signs of osteomyelitis in the distal fibula where the ulcer is located. Discussed that with osteomyelitis/bone infection this needs to be surgically removed to try to get the infection under control. We discussed that antibiotics do not usually get rid of the infection itself.  -Discussed that I am also concerned with his decrease in blood flow and that if we proceed  with surgery at this time this could cause worsening of the wound and nonhealing. He is at high risk of loss of below the knee amputation.  -Patient underwent angiogram today and cleared to proceed with left ankle surgery.   -Will schedule for tomorrow for I&D and bone resection of fibula with antibiotic bone cement placement. On for 8:30am tomorrow morning.   -Patient to be NPO after midnight tonight.     Kina Whitley DPM, Podiatry/Foot and Ankle Surgery  12:51 PM

## 2021-08-07 ENCOUNTER — HOME INFUSION (PRE-WILLOW HOME INFUSION) (OUTPATIENT)
Dept: PHARMACY | Facility: CLINIC | Age: 71
End: 2021-08-07

## 2021-08-07 ENCOUNTER — APPOINTMENT (OUTPATIENT)
Dept: GENERAL RADIOLOGY | Facility: CLINIC | Age: 71
DRG: 623 | End: 2021-08-07
Attending: PODIATRIST
Payer: MEDICARE

## 2021-08-07 ENCOUNTER — ANESTHESIA EVENT (OUTPATIENT)
Dept: SURGERY | Facility: CLINIC | Age: 71
DRG: 623 | End: 2021-08-07
Payer: MEDICARE

## 2021-08-07 ENCOUNTER — ANESTHESIA (OUTPATIENT)
Dept: SURGERY | Facility: CLINIC | Age: 71
DRG: 623 | End: 2021-08-07
Payer: MEDICARE

## 2021-08-07 LAB
ANION GAP SERPL CALCULATED.3IONS-SCNC: 1 MMOL/L (ref 3–14)
BASOPHILS # BLD AUTO: 0.1 10E3/UL (ref 0–0.2)
BASOPHILS NFR BLD AUTO: 1 %
BUN SERPL-MCNC: 11 MG/DL (ref 7–30)
CALCIUM SERPL-MCNC: 8.3 MG/DL (ref 8.5–10.1)
CHLORIDE BLD-SCNC: 104 MMOL/L (ref 94–109)
CHOLEST SERPL-MCNC: 183 MG/DL
CO2 SERPL-SCNC: 31 MMOL/L (ref 20–32)
CREAT SERPL-MCNC: 0.97 MG/DL (ref 0.66–1.25)
EOSINOPHIL # BLD AUTO: 0.4 10E3/UL (ref 0–0.7)
EOSINOPHIL NFR BLD AUTO: 4 %
ERYTHROCYTE [DISTWIDTH] IN BLOOD BY AUTOMATED COUNT: 13.1 % (ref 10–15)
FASTING STATUS PATIENT QL REPORTED: YES
GFR SERPL CREATININE-BSD FRML MDRD: 79 ML/MIN/1.73M2
GLUCOSE BLD-MCNC: 173 MG/DL (ref 70–99)
GLUCOSE BLDC GLUCOMTR-MCNC: 165 MG/DL (ref 70–99)
GLUCOSE BLDC GLUCOMTR-MCNC: 174 MG/DL (ref 70–99)
GLUCOSE BLDC GLUCOMTR-MCNC: 176 MG/DL (ref 70–99)
GLUCOSE BLDC GLUCOMTR-MCNC: 181 MG/DL (ref 70–99)
GLUCOSE BLDC GLUCOMTR-MCNC: 183 MG/DL (ref 70–99)
GLUCOSE BLDC GLUCOMTR-MCNC: 206 MG/DL (ref 70–99)
HCT VFR BLD AUTO: 38.4 % (ref 40–53)
HDLC SERPL-MCNC: 27 MG/DL
HGB BLD-MCNC: 12.2 G/DL (ref 13.3–17.7)
IMM GRANULOCYTES # BLD: 0.1 10E3/UL
IMM GRANULOCYTES NFR BLD: 1 %
LDLC SERPL CALC-MCNC: 121 MG/DL
LYMPHOCYTES # BLD AUTO: 1.7 10E3/UL (ref 0.8–5.3)
LYMPHOCYTES NFR BLD AUTO: 17 %
MCH RBC QN AUTO: 29.5 PG (ref 26.5–33)
MCHC RBC AUTO-ENTMCNC: 31.8 G/DL (ref 31.5–36.5)
MCV RBC AUTO: 93 FL (ref 78–100)
MONOCYTES # BLD AUTO: 0.7 10E3/UL (ref 0–1.3)
MONOCYTES NFR BLD AUTO: 7 %
NEUTROPHILS # BLD AUTO: 7.3 10E3/UL (ref 1.6–8.3)
NEUTROPHILS NFR BLD AUTO: 70 %
NONHDLC SERPL-MCNC: 156 MG/DL
NRBC # BLD AUTO: 0 10E3/UL
NRBC BLD AUTO-RTO: 0 /100
PLATELET # BLD AUTO: 257 10E3/UL (ref 150–450)
POTASSIUM BLD-SCNC: 3.9 MMOL/L (ref 3.4–5.3)
RBC # BLD AUTO: 4.13 10E6/UL (ref 4.4–5.9)
SODIUM SERPL-SCNC: 136 MMOL/L (ref 133–144)
TRIGL SERPL-MCNC: 175 MG/DL
WBC # BLD AUTO: 10.2 10E3/UL (ref 4–11)

## 2021-08-07 PROCEDURE — 250N000025 HC SEVOFLURANE, PER MIN: Performed by: PODIATRIST

## 2021-08-07 PROCEDURE — 87186 SC STD MICRODIL/AGAR DIL: CPT | Performed by: PODIATRIST

## 2021-08-07 PROCEDURE — 250N000009 HC RX 250: Performed by: PODIATRIST

## 2021-08-07 PROCEDURE — 278N000051 HC OR IMPLANT GENERAL: Performed by: PODIATRIST

## 2021-08-07 PROCEDURE — 250N000009 HC RX 250: Performed by: NURSE ANESTHETIST, CERTIFIED REGISTERED

## 2021-08-07 PROCEDURE — 999N000063 XR ANKLE PORT LEFT G/E 3 VIEWS: Mod: LT

## 2021-08-07 PROCEDURE — 360N000075 HC SURGERY LEVEL 2, PER MIN: Performed by: PODIATRIST

## 2021-08-07 PROCEDURE — 250N000011 HC RX IP 250 OP 636: Performed by: PODIATRIST

## 2021-08-07 PROCEDURE — 258N000003 HC RX IP 258 OP 636: Performed by: NURSE ANESTHETIST, CERTIFIED REGISTERED

## 2021-08-07 PROCEDURE — 250N000011 HC RX IP 250 OP 636: Performed by: NURSE ANESTHETIST, CERTIFIED REGISTERED

## 2021-08-07 PROCEDURE — 36415 COLL VENOUS BLD VENIPUNCTURE: CPT | Performed by: PODIATRIST

## 2021-08-07 PROCEDURE — 0HXNXZZ TRANSFER LEFT FOOT SKIN, EXTERNAL APPROACH: ICD-10-PCS | Performed by: SURGERY

## 2021-08-07 PROCEDURE — 250N000011 HC RX IP 250 OP 636: Performed by: PHYSICIAN ASSISTANT

## 2021-08-07 PROCEDURE — 258N000003 HC RX IP 258 OP 636: Performed by: ANESTHESIOLOGY

## 2021-08-07 PROCEDURE — 250N000013 HC RX MED GY IP 250 OP 250 PS 637: Performed by: PODIATRIST

## 2021-08-07 PROCEDURE — 120N000001 HC R&B MED SURG/OB

## 2021-08-07 PROCEDURE — 87075 CULTR BACTERIA EXCEPT BLOOD: CPT | Performed by: PODIATRIST

## 2021-08-07 PROCEDURE — 88311 DECALCIFY TISSUE: CPT | Mod: TC | Performed by: PODIATRIST

## 2021-08-07 PROCEDURE — 272N000001 HC OR GENERAL SUPPLY STERILE: Performed by: PODIATRIST

## 2021-08-07 PROCEDURE — 99232 SBSQ HOSP IP/OBS MODERATE 35: CPT | Performed by: INTERNAL MEDICINE

## 2021-08-07 PROCEDURE — 258N000003 HC RX IP 258 OP 636: Performed by: PODIATRIST

## 2021-08-07 PROCEDURE — 14302 TIS TRNFR ADDL 30 SQ CM: CPT | Mod: LT | Performed by: PODIATRIST

## 2021-08-07 PROCEDURE — 27641 PARTIAL REMOVAL OF FIBULA: CPT | Mod: 51 | Performed by: PODIATRIST

## 2021-08-07 PROCEDURE — 14021 TIS TRNFR S/A/L 10.1-30 SQCM: CPT | Mod: LT | Performed by: PODIATRIST

## 2021-08-07 PROCEDURE — 999N000141 HC STATISTIC PRE-PROCEDURE NURSING ASSESSMENT: Performed by: PODIATRIST

## 2021-08-07 PROCEDURE — 85025 COMPLETE CBC W/AUTO DIFF WBC: CPT | Performed by: PODIATRIST

## 2021-08-07 PROCEDURE — 82465 ASSAY BLD/SERUM CHOLESTEROL: CPT | Performed by: PODIATRIST

## 2021-08-07 PROCEDURE — 710N000009 HC RECOVERY PHASE 1, LEVEL 1, PER MIN: Performed by: PODIATRIST

## 2021-08-07 PROCEDURE — 80048 BASIC METABOLIC PNL TOTAL CA: CPT | Performed by: PODIATRIST

## 2021-08-07 PROCEDURE — 370N000017 HC ANESTHESIA TECHNICAL FEE, PER MIN: Performed by: PODIATRIST

## 2021-08-07 PROCEDURE — 0QBK0ZZ EXCISION OF LEFT FIBULA, OPEN APPROACH: ICD-10-PCS | Performed by: PODIATRIST

## 2021-08-07 DEVICE — BONE CEMENT SIMPLEX W/TOBRAMYCIN 6197-9-001: Type: IMPLANTABLE DEVICE | Site: ANKLE | Status: FUNCTIONAL

## 2021-08-07 RX ORDER — ONDANSETRON 2 MG/ML
4 INJECTION INTRAMUSCULAR; INTRAVENOUS EVERY 6 HOURS PRN
Status: DISCONTINUED | OUTPATIENT
Start: 2021-08-07 | End: 2021-08-11 | Stop reason: HOSPADM

## 2021-08-07 RX ORDER — ONDANSETRON 2 MG/ML
4 INJECTION INTRAMUSCULAR; INTRAVENOUS EVERY 30 MIN PRN
Status: DISCONTINUED | OUTPATIENT
Start: 2021-08-07 | End: 2021-08-07 | Stop reason: HOSPADM

## 2021-08-07 RX ORDER — BISACODYL 10 MG
10 SUPPOSITORY, RECTAL RECTAL DAILY PRN
Status: DISCONTINUED | OUTPATIENT
Start: 2021-08-07 | End: 2021-08-11 | Stop reason: HOSPADM

## 2021-08-07 RX ORDER — ACETAMINOPHEN 325 MG/1
650 TABLET ORAL EVERY 4 HOURS PRN
Status: DISCONTINUED | OUTPATIENT
Start: 2021-08-10 | End: 2021-08-10

## 2021-08-07 RX ORDER — OXYCODONE HYDROCHLORIDE 5 MG/1
10 TABLET ORAL EVERY 4 HOURS PRN
Status: DISCONTINUED | OUTPATIENT
Start: 2021-08-07 | End: 2021-08-11 | Stop reason: HOSPADM

## 2021-08-07 RX ORDER — LIDOCAINE 40 MG/G
CREAM TOPICAL
Status: DISCONTINUED | OUTPATIENT
Start: 2021-08-07 | End: 2021-08-11 | Stop reason: HOSPADM

## 2021-08-07 RX ORDER — POLYETHYLENE GLYCOL 3350 17 G/17G
17 POWDER, FOR SOLUTION ORAL DAILY
Status: DISCONTINUED | OUTPATIENT
Start: 2021-08-08 | End: 2021-08-11 | Stop reason: HOSPADM

## 2021-08-07 RX ORDER — FENTANYL CITRATE 50 UG/ML
INJECTION, SOLUTION INTRAMUSCULAR; INTRAVENOUS PRN
Status: DISCONTINUED | OUTPATIENT
Start: 2021-08-07 | End: 2021-08-07

## 2021-08-07 RX ORDER — ONDANSETRON 2 MG/ML
INJECTION INTRAMUSCULAR; INTRAVENOUS PRN
Status: DISCONTINUED | OUTPATIENT
Start: 2021-08-07 | End: 2021-08-07

## 2021-08-07 RX ORDER — SODIUM CHLORIDE, SODIUM LACTATE, POTASSIUM CHLORIDE, CALCIUM CHLORIDE 600; 310; 30; 20 MG/100ML; MG/100ML; MG/100ML; MG/100ML
INJECTION, SOLUTION INTRAVENOUS CONTINUOUS
Status: DISCONTINUED | OUTPATIENT
Start: 2021-08-07 | End: 2021-08-07 | Stop reason: HOSPADM

## 2021-08-07 RX ORDER — MAGNESIUM HYDROXIDE 1200 MG/15ML
LIQUID ORAL PRN
Status: DISCONTINUED | OUTPATIENT
Start: 2021-08-07 | End: 2021-08-07 | Stop reason: HOSPADM

## 2021-08-07 RX ORDER — PROPOFOL 10 MG/ML
INJECTION, EMULSION INTRAVENOUS PRN
Status: DISCONTINUED | OUTPATIENT
Start: 2021-08-07 | End: 2021-08-07

## 2021-08-07 RX ORDER — ACETAMINOPHEN 325 MG/1
975 TABLET ORAL EVERY 8 HOURS
Status: COMPLETED | OUTPATIENT
Start: 2021-08-07 | End: 2021-08-10

## 2021-08-07 RX ORDER — OXYCODONE HYDROCHLORIDE 5 MG/1
5 TABLET ORAL EVERY 4 HOURS PRN
Status: DISCONTINUED | OUTPATIENT
Start: 2021-08-07 | End: 2021-08-11 | Stop reason: HOSPADM

## 2021-08-07 RX ORDER — BUPIVACAINE HYDROCHLORIDE 5 MG/ML
INJECTION, SOLUTION PERINEURAL PRN
Status: DISCONTINUED | OUTPATIENT
Start: 2021-08-07 | End: 2021-08-07 | Stop reason: HOSPADM

## 2021-08-07 RX ORDER — AMOXICILLIN 250 MG
1 CAPSULE ORAL 2 TIMES DAILY
Status: DISCONTINUED | OUTPATIENT
Start: 2021-08-07 | End: 2021-08-11 | Stop reason: HOSPADM

## 2021-08-07 RX ORDER — LIDOCAINE HYDROCHLORIDE 20 MG/ML
INJECTION, SOLUTION INFILTRATION; PERINEURAL PRN
Status: DISCONTINUED | OUTPATIENT
Start: 2021-08-07 | End: 2021-08-07

## 2021-08-07 RX ORDER — FENTANYL CITRATE 50 UG/ML
25 INJECTION, SOLUTION INTRAMUSCULAR; INTRAVENOUS EVERY 5 MIN PRN
Status: DISCONTINUED | OUTPATIENT
Start: 2021-08-07 | End: 2021-08-07 | Stop reason: HOSPADM

## 2021-08-07 RX ORDER — ONDANSETRON 4 MG/1
4 TABLET, ORALLY DISINTEGRATING ORAL EVERY 6 HOURS PRN
Status: DISCONTINUED | OUTPATIENT
Start: 2021-08-07 | End: 2021-08-11 | Stop reason: HOSPADM

## 2021-08-07 RX ORDER — HYDROMORPHONE HCL IN WATER/PF 6 MG/30 ML
0.2 PATIENT CONTROLLED ANALGESIA SYRINGE INTRAVENOUS EVERY 5 MIN PRN
Status: DISCONTINUED | OUTPATIENT
Start: 2021-08-07 | End: 2021-08-07 | Stop reason: HOSPADM

## 2021-08-07 RX ORDER — EPHEDRINE SULFATE 50 MG/ML
INJECTION, SOLUTION INTRAMUSCULAR; INTRAVENOUS; SUBCUTANEOUS PRN
Status: DISCONTINUED | OUTPATIENT
Start: 2021-08-07 | End: 2021-08-07

## 2021-08-07 RX ORDER — ONDANSETRON 4 MG/1
4 TABLET, ORALLY DISINTEGRATING ORAL EVERY 30 MIN PRN
Status: DISCONTINUED | OUTPATIENT
Start: 2021-08-07 | End: 2021-08-07 | Stop reason: HOSPADM

## 2021-08-07 RX ORDER — OXYCODONE HYDROCHLORIDE 5 MG/1
5 TABLET ORAL EVERY 4 HOURS PRN
Status: DISCONTINUED | OUTPATIENT
Start: 2021-08-07 | End: 2021-08-07

## 2021-08-07 RX ORDER — PROCHLORPERAZINE MALEATE 5 MG
5 TABLET ORAL EVERY 6 HOURS PRN
Status: DISCONTINUED | OUTPATIENT
Start: 2021-08-07 | End: 2021-08-11 | Stop reason: HOSPADM

## 2021-08-07 RX ADMIN — PHENYLEPHRINE HYDROCHLORIDE 100 MCG: 10 INJECTION INTRAVENOUS at 08:55

## 2021-08-07 RX ADMIN — PHENYLEPHRINE HYDROCHLORIDE 100 MCG: 10 INJECTION INTRAVENOUS at 09:03

## 2021-08-07 RX ADMIN — SODIUM CHLORIDE: 9 INJECTION, SOLUTION INTRAVENOUS at 11:35

## 2021-08-07 RX ADMIN — INSULIN ASPART 1 UNITS: 100 INJECTION, SOLUTION INTRAVENOUS; SUBCUTANEOUS at 11:39

## 2021-08-07 RX ADMIN — ATORVASTATIN CALCIUM 40 MG: 40 TABLET, FILM COATED ORAL at 19:43

## 2021-08-07 RX ADMIN — FENTANYL CITRATE 25 MCG: 50 INJECTION, SOLUTION INTRAMUSCULAR; INTRAVENOUS at 09:44

## 2021-08-07 RX ADMIN — ACETAMINOPHEN 975 MG: 325 TABLET, FILM COATED ORAL at 11:34

## 2021-08-07 RX ADMIN — PIPERACILLIN SODIUM AND TAZOBACTAM SODIUM 3.38 G: 3; .375 INJECTION, POWDER, LYOPHILIZED, FOR SOLUTION INTRAVENOUS at 19:42

## 2021-08-07 RX ADMIN — PHENYLEPHRINE HYDROCHLORIDE 100 MCG: 10 INJECTION INTRAVENOUS at 09:40

## 2021-08-07 RX ADMIN — SODIUM CHLORIDE, POTASSIUM CHLORIDE, SODIUM LACTATE AND CALCIUM CHLORIDE: 600; 310; 30; 20 INJECTION, SOLUTION INTRAVENOUS at 08:29

## 2021-08-07 RX ADMIN — PHENYLEPHRINE HYDROCHLORIDE 100 MCG: 10 INJECTION INTRAVENOUS at 09:31

## 2021-08-07 RX ADMIN — OXYCODONE HYDROCHLORIDE 5 MG: 5 TABLET ORAL at 23:38

## 2021-08-07 RX ADMIN — FENTANYL CITRATE 50 MCG: 50 INJECTION, SOLUTION INTRAMUSCULAR; INTRAVENOUS at 08:38

## 2021-08-07 RX ADMIN — PIPERACILLIN SODIUM AND TAZOBACTAM SODIUM 3.38 G: 3; .375 INJECTION, POWDER, LYOPHILIZED, FOR SOLUTION INTRAVENOUS at 06:50

## 2021-08-07 RX ADMIN — Medication 5 MG: at 09:12

## 2021-08-07 RX ADMIN — PHENYLEPHRINE HYDROCHLORIDE 100 MCG: 10 INJECTION INTRAVENOUS at 09:23

## 2021-08-07 RX ADMIN — PHENYLEPHRINE HYDROCHLORIDE 100 MCG: 10 INJECTION INTRAVENOUS at 09:12

## 2021-08-07 RX ADMIN — INSULIN ASPART 1 UNITS: 100 INJECTION, SOLUTION INTRAVENOUS; SUBCUTANEOUS at 17:33

## 2021-08-07 RX ADMIN — ACETAMINOPHEN 975 MG: 325 TABLET, FILM COATED ORAL at 19:42

## 2021-08-07 RX ADMIN — PIPERACILLIN SODIUM AND TAZOBACTAM SODIUM 3.38 G: 3; .375 INJECTION, POWDER, LYOPHILIZED, FOR SOLUTION INTRAVENOUS at 01:30

## 2021-08-07 RX ADMIN — PIPERACILLIN SODIUM AND TAZOBACTAM SODIUM 3.38 G: 3; .375 INJECTION, POWDER, LYOPHILIZED, FOR SOLUTION INTRAVENOUS at 13:12

## 2021-08-07 RX ADMIN — MIDAZOLAM 2 MG: 1 INJECTION INTRAMUSCULAR; INTRAVENOUS at 08:29

## 2021-08-07 RX ADMIN — PROPOFOL 200 MG: 10 INJECTION, EMULSION INTRAVENOUS at 08:38

## 2021-08-07 RX ADMIN — ONDANSETRON 4 MG: 2 INJECTION INTRAMUSCULAR; INTRAVENOUS at 08:44

## 2021-08-07 RX ADMIN — FENTANYL CITRATE 25 MCG: 50 INJECTION, SOLUTION INTRAMUSCULAR; INTRAVENOUS at 09:20

## 2021-08-07 RX ADMIN — Medication 5 MG: at 08:49

## 2021-08-07 RX ADMIN — LIDOCAINE HYDROCHLORIDE 100 MG: 20 INJECTION, SOLUTION INFILTRATION; PERINEURAL at 08:38

## 2021-08-07 ASSESSMENT — ENCOUNTER SYMPTOMS
DYSRHYTHMIAS: 0
SEIZURES: 0

## 2021-08-07 ASSESSMENT — ACTIVITIES OF DAILY LIVING (ADL)
ADLS_ACUITY_SCORE: 17
ADLS_ACUITY_SCORE: 19
ADLS_ACUITY_SCORE: 17

## 2021-08-07 ASSESSMENT — LIFESTYLE VARIABLES: TOBACCO_USE: 0

## 2021-08-07 NOTE — ANESTHESIA CARE TRANSFER NOTE
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    Anesthesia Type:   General     Note:    Oropharynx: oropharynx clear of all foreign objects  Level of Consciousness: awake  Oxygen Supplementation: face mask  Level of Supplemental Oxygen (L/min / FiO2): 6  Independent Airway: airway patency satisfactory and stable  Dentition: dentition unchanged  Vital Signs Stable: post-procedure vital signs reviewed and stable  Report to RN Given: handoff report given  Patient transferred to: PACU  Comments: At end of procedure, spontaneous respirations, adequate tidal volumes, followed commands to voice, LMA removed atraumatically, oropharynx suctioned, airway patent after LMA removal. Oxygen via facemask at 6 liters per minute to PACU. Oxygen tubing connected to wall O2 in PACU, SpO2, NiBP, and EKG monitors and alarms on and functioning, report on patient's clinical status given to PACU RN, RN questions answered.  Handoff Report: Identifed the Patient, Identified the Reponsible Provider, Reviewed the pertinent medical history, Discussed the surgical course, Reviewed Intra-OP anesthesia mangement and issues during anesthesia, Set expectations for post-procedure period and Allowed opportunity for questions and acknowledgement of understanding      Vitals:  Vitals Value Taken Time   /77 08/07/21 0955   Temp     Pulse 81 08/07/21 0957   Resp 18 08/07/21 0957   SpO2 100 % 08/07/21 0957   Vitals shown include unvalidated device data.    Electronically Signed By: KAREEM Molina CRNA  August 7, 2021  9:58 AM

## 2021-08-07 NOTE — ANESTHESIA PREPROCEDURE EVALUATION
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) [L97.324]   Procedure : Procedure(s):  Incision and debridement left ankle with bone excision and biopsy     Past Medical History:   Diagnosis Date     ATN (acute tubular necrosis) (H)     in association with diabetic foot infection, cr as high as 8.0; was 2.1 at discharge in      Diabetic gangrene (H)     resting bedside ABIs of 0.9 bilaterally; ocurred in association with  infection after agressive toenail cutting which the pt did himself     Hyperlipidemia LDL goal < 70     tg > 700 at dx     Peripheral neuropathy     secondary to previously unrecognized DM2     Type II or unspecified type diabetes mellitus without mention of complication, uncontrolled     very angry about his dx      Past Surgical History:   Procedure Laterality Date     IR LOWER EXTREMITY ANGIOGRAM LEFT  2021     TONSILLECTOMY & ADENOIDECTOMY       [facility] NONSPECIFIC PROCEDURE  2010    Open left 5th toe amputation, Dr. Hussein Engle     [facility] NONSPECIFIC PROCEDURE  2010    Full thickness subcutaneous debridement of left open 5th ray amputation site 2010, Dr. Hussein Engle.     [facility] NONSPECIFIC PROCEDURE  2010    Completion 5th ray metatarsal amputation with wound closure and full thickness subcutaneous debridement, Dr. Hussein Engle 2010.     [facility] NONSPECIFIC PROCEDURE      varicose vein surgery of the left lower extremity.      No Known Allergies   Social History     Tobacco Use     Smoking status: Former Smoker   Substance Use Topics     Alcohol use: Yes     Comment: occ      Wt Readings from Last 1 Encounters:   21 100.3 kg (221 lb 1.6 oz)        No Known  Allergies  Prior to Admission medications    Medication Sig Start Date End Date Taking? Authorizing Provider   metFORMIN (GLUCOPHAGE) 500 MG tablet Take 1,000 mg by mouth 2 times daily (with meals)   Yes Reported, Patient       Anesthesia Evaluation   Pt has had prior anesthetic. Type: General.    No history of anesthetic complications       ROS/MED HX  ENT/Pulmonary:    (-) tobacco use, asthma and sleep apnea   Neurologic:     (+) peripheral neuropathy,  (-) no seizures, no CVA and migraines   Cardiovascular:     (+) Dyslipidemia hypertension (new dx on this admission)-Peripheral Vascular Disease---- (-) CAD, WILSON, arrhythmias and valvular problems/murmurs   METS/Exercise Tolerance:     Hematologic:    (-) history of blood clots and anemia   Musculoskeletal:    (-) arthritis   GI/Hepatic:    (-) GERD and liver disease   Renal/Genitourinary:    (-) renal disease and nephrolithiasis   Endo:     (+) type II DM, Diabetic complications: neuropathy.  (-) Type I DM, thyroid disease and obesity   Psychiatric/Substance Use:    (-) psychiatric history   Infectious Disease: Comment: Osteomyelitis     (-) Recent Fever   Malignancy:       Other:            Physical Exam    Airway        Mallampati: III   TM distance: > 3 FB   Neck ROM: full   Mouth opening: > 3 cm    Respiratory Devices and Support         Dental       (+) chipped      Cardiovascular          Rhythm and rate: normal     Pulmonary   pulmonary exam normal                OUTSIDE LABS:  CBC:   Lab Results   Component Value Date    WBC 10.3 08/05/2021    WBC 12.9 (H) 08/03/2021    HGB 12.4 (L) 08/05/2021    HGB 13.1 (L) 08/03/2021    HCT 38.6 (L) 08/05/2021    HCT 39.9 (L) 08/03/2021     08/05/2021     08/03/2021     BMP:   Lab Results   Component Value Date     08/05/2021     08/03/2021    POTASSIUM 4.3 08/05/2021    POTASSIUM 3.9 08/03/2021    CHLORIDE 107 08/05/2021    CHLORIDE 104 08/03/2021    CO2 26 08/05/2021    CO2 25 08/03/2021     BUN 18 08/05/2021    BUN 27 08/03/2021    CR 0.97 08/05/2021    CR 1.16 08/03/2021     (H) 08/07/2021     (H) 08/06/2021     COAGS:   Lab Results   Component Value Date    INR 0.98 04/20/2010     POC:   Lab Results   Component Value Date     (H) 07/16/2016     HEPATIC:   Lab Results   Component Value Date    ALBUMIN 4.1 @ 05/20/2010    PROTTOTAL 7.2 04/20/2010    ALT 41 04/20/2010    AST 32 04/20/2010    ALKPHOS 307 (H) 04/20/2010    BILITOTAL 0.9 04/20/2010     OTHER:   Lab Results   Component Value Date    LACT 1.0 08/03/2021    A1C 7.6 (H) 08/04/2021    SHEILA 8.5 08/05/2021    PHOS 5.5 (H) 04/18/2010    MAG 1.9 04/12/2010    .0 (H) 08/04/2021    SED 14 08/04/2021       Anesthesia Plan    ASA Status:  2   NPO Status:  NPO Appropriate    Anesthesia Type: General.     - Airway: LMA   Induction: Propofol.   Maintenance: Balanced.        Consents    Anesthesia Plan(s) and associated risks, benefits, and realistic alternatives discussed. Questions answered and patient/representative(s) expressed understanding.     - Discussed with:  Patient         Postoperative Care    Pain management: Multi-modal analgesia.   PONV prophylaxis: Ondansetron (or other 5HT-3), Dexamethasone or Solumedrol     Comments:                Krystina Pennington MD

## 2021-08-07 NOTE — ANESTHESIA PROCEDURE NOTES
Airway       Patient location during procedure: OR       Procedure Start/Stop Times: 8/7/2021 8:41 AM  Staff -        Anesthesiologist:  Krystina Pennington MD       CRNA: Leonora Herbert APRN CRNA       Performed By: CRNA  Consent for Airway        Urgency: elective  Indications and Patient Condition       Indications for airway management: nguyễn-procedural       Induction type:intravenous       Mask difficulty assessment: 1 - vent by mask    Final Airway Details       Final airway type: supraglottic airway    Supraglottic Airway Details        Type: LMA       Brand: Ambu AuraGain       LMA size: 5    Post intubation assessment        Placement verified by: capnometry, equal breath sounds and chest rise        Number of attempts at approach: 1       Number of other approaches attempted: 0       Secured with: silk tape       Ease of procedure: easy       Dentition: Intact and Unchanged

## 2021-08-07 NOTE — BRIEF OP NOTE
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) [L97.324]  Post-operative diagnosis Same as pre-operative diagnosis    Procedure: Procedure(s):  Incision and debridement left ankle with bone excision and biopsy  Surgeon: Surgeon(s) and Role:     * Kina Whitley DPM, Podiatry/Foot and Ankle Surgery - Primary  Anesthesia: Combined General with Popliteal Block   Estimated blood loss: 20 ml  Drains: none  Specimens:   ID Type Source Tests Collected by Time Destination   1 : ASSESS FOR BONE INFECTION Tissue Ankle, Left SURGICAL PATHOLOGY EXAM Kina Whitley DPM, Podiatry/Foot and Ankle Surgery 8/7/2021  9:10 AM    A :  Wound Ankle, Left ANAEROBIC BACTERIAL CULTURE ROUTINE Kina Whitley DPM, Podiatry/Foot and Ankle Surgery 8/7/2021  9:12 AM    B :  Wound Ankle, Left AEROBIC BACTERIAL CULTURE ROUTINE Kina Whitley DPM, Podiatry/Foot and Ankle Surgery 8/7/2021  9:13 AM      Findings:   None.  Complications: None.  Implants:   Implant Name Type Inv. Item Serial No.  Lot No. LRB No. Used Action   BONE CEMENT SIMPLEX W/TOBRAMYCIN 6197-9-001 - [id number] Cement, Bone BONE CEMENT SIMPLEX W/TOBRAMYCIN 6197-9-001  [employer] [id number] Left 1 Implanted            DISCHARGE INSTRUCTIONS     As part of your transition home, we are providing you with this set of discharge instructions, as a guide to help you in that process. In addition to the care provided during your hospital stay, there may be upcoming clinic visits, laboratory appointments, and/or results noted on this After Visit Summary (AVS).     To assist the physicians caring for you during this transition, we would like you remind you of the followin. Please call a Provider for help if you experience any of the following: Any questions or concerns  2. Activity Instructions: Normal activity as tolerated  3. Dressing/Wound Instructions: Not applicable  4. Lifting & Weight-bearing Instructions: No restrictions  5. Diet: Return to previous diet  6. Miscellaneous: Take medications as prescribed and attend follow up.     If you have any questions 24-48 hours after discharge, please feel free to contact the hospital unit/department you were discharged from.

## 2021-08-07 NOTE — ANESTHESIA POSTPROCEDURE EVALUATION
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    Anesthesia Type:  General    Note:  Disposition: Admission   Postop Pain Control: Uneventful            Sign Out: Well controlled pain   PONV: No   Neuro/Psych: Uneventful            Sign Out: Acceptable/Baseline neuro status   Airway/Respiratory: Uneventful            Sign Out: Acceptable/Baseline resp. status   CV/Hemodynamics: Uneventful            Sign Out: Acceptable CV status; No obvious hypovolemia; No obvious fluid overload   Other NRE: NONE   DID A NON-ROUTINE EVENT OCCUR? No           Last vitals:  Vitals Value Taken Time   /83 08/07/21 1040   Temp     Pulse 74 08/07/21 1044   Resp 19 08/07/21 1044   SpO2 97 % 08/07/21 1045   Vitals shown include unvalidated device data.    Electronically Signed By: Krystina Pennington MD  August 7, 2021  1:14 PM

## 2021-08-07 NOTE — PROGRESS NOTES
Care Transitions Note:    The following is a self pay quote for IV Zosyn given by [facility] intake dept:  Thank you for being patient with us as we determine benefit coverage for Rangel Yang.         Pt has all Medicare products, which does not cover IV ABX in the home. (Pt would have coverage for short term TCU or IC). Below is what pt would be responsible for if pt wanted to go w [facility] home infusion                   Drug would go to Part-D (pt would be responsible for the co-pay per dispense)              Pt would have to self-pay for the per-kin (daily)              If not homebound, nursing would also be self-pay (per visit)         Here is what we are anticipating for out of pocket costs, based on the above information and medication currently being administered In Patient(Estimate attached for your review)         Name of Medication, dose & frequency    Cost of drugs & supplies per day    Cost per RN visit if patient is not Homebound    Zosyn 3.375g q6h    $65.43    This cost will be dependent on what the Homecare Agency charges. Could range from $90-$200         This estimate is subject to change if the recommended medication is different than what is listed above.

## 2021-08-07 NOTE — PLAN OF CARE
Patient is alert and oriented x4 vital signs stable. Denies pain up independent in the room. Tolerating MOD CHO. PIV infusing fluids @75ml/hr. Redness and swelling to left leg, foam dressing to left leg wound. Angiogram via right groin site covered with guaze &Tegaderm  CMS intact. Plan for I&D and bone resection of fibula tomorrow at 8:30AM. NPO at midnight.

## 2021-08-07 NOTE — OP NOTE
Procedure Date: 08/07/2021    SURGEON:  Kina Whitley DPM    PREOPERATIVE DIAGNOSES:    1.  Diabetic neuropathy.  2.  Peripheral arterial disease.  3.  Ulceration, left ankle.  4.  Osteomyelitis, left fibula.    POSTOPERATIVE DIAGNOSES:    1.  Diabetic with neuropathy.  2.  Peripheral arterial disease.  3.  Ulceration, left ankle.  4.  Osteomyelitis, left fibula.    PROCEDURE:    1.  Ulcer excision with flap closure.  2.  Left ankle fibular bone excision with biopsy and deep cultures.    ANESTHESIA:  General with local.    HEMOSTASIS:  Electrocautery.    ESTIMATED BLOOD LOSS:  20 mL    SPECIMENS REMOVED:  Left fibular bone for path and culture.    MATERIALS:  None.    INDICATIONS FOR PROCEDURE:  Mr. Yang is a 71-year-old uncontrolled diabetic male who presented to the hospital with worsening redness and swelling to the left ankle.  He notes that he fell off a ladder a week previously and sustained open wounds on the leg.  The other 1 has healed up, but this 1 got worse.  On physical exam, patient's pulses are not very palpable.  MIYA showed decreased blood flow.  Ulceration to the lateral aspect of the left ankle does probe to bone.  MRI shows early osteomyelitis in the distal fibula.  The patient underwent angiogram yesterday to improve blood flow to the foot and we discussed with the patient to go in and remove some of the fibular bone and put bone cement over this area to try to help get rid of the bone infection.  We discussed that we will also try to close the ulceration; however, we may not be able to do that because the skin is not very stretchy.  Risks, benefits, and complications were discussed with the patient and the patient's daughter and he wishes to proceed with surgery.    DESCRIPTION OF PROCEDURE:  The patient was brought to the operating room and placed on the operating table in a supine position.  Anesthesia was administered and local was injected.  The patient was placed in the lateral prone  position with the left side up using a beanbag.  The foot was prepped and draped using sterile technique.  Attention was directed to the lateral aspect of the left ankle.  An elliptical incision was made to excise the ulceration over the distal fibula measuring approximately 4 x 2 cm full thickness down to bone with a #15 blade.  The ulcer was excised in total.  The tissue was sharply dissected off of the distal fibula.  The peroneal tendons were noted and retracted posteriorly and plantarly.  The cortex of the distal fibula was then removed using sagittal saw and this was sent for pathology and culture.  This was then packed with tobramycin and vancomycin bone cement.  The wound was flushed with copious amounts of normal saline.  The incision was then extended anteriorly and posteriorly to try to help with closure of the ulceration.  The skin did not have much give.  The incision was 90% closed with a skin flap measuring 7 x 6 cm and a medial area that was still open measuring approximately 1.5 x 0.8 cm.  The tendon is exposed.  The patient's foot was placed in dry sterile dressing.  The patient tolerated the procedure and anesthesia well and was transferred to recovery with vital signs stable and vascular status intact.  The patient will be minimal weightbearing in a postop boot.  We will order a wound VAC for the left ankle.    Kina Whitley DPM        D: 2021   T: 2021   MT: LITTLE    Name:     MATT SALAZAR  MRN:      [id number]        Account:        [id number]   :      1950           Procedure Date: 2021     Document: [id number]

## 2021-08-07 NOTE — PROGRESS NOTES
[id number]: Writer gave report to Pre Op RN around 0730, pt to procedure close to that time, and then back to unit at 1045. Writer was unable to perform assessment on pt. Pt in bed comfortably, daughter at bedside.

## 2021-08-07 NOTE — PROGRESS NOTES
VASCULAR SURGERY PROGRESS NOTE    Subjective:  Underwent L ankle I&D    Objective:  Intake/Output Summary (Last 24 hours) at 8/7/2021 1035  Last data filed at 8/7/2021 0947  Gross per 24 hour   Intake 1510 ml   Output 20 ml   Net 1490 ml     Labs:  ROUTINE IP LABS (Last four results)  BMP  Recent Labs   Lab 08/07/21  0820 08/07/21  0129 08/06/21  2200 08/06/21  2111 08/05/21  0511 08/03/21  1920   NA  --   --   --   --  136 136   POTASSIUM  --   --   --   --  4.3 3.9   CHLORIDE  --   --   --   --  107 104   SHEILA  --   --   --   --  8.5 8.8   CO2  --   --   --   --  26 25   BUN  --   --   --   --  18 27   CR  --   --   --   --  0.97 1.16   * 165* 157* 193* 199* 182*     CBC  Recent Labs   Lab 08/05/21  0511 08/03/21 1920   WBC 10.3 12.9*   RBC 4.17* 4.35*   HGB 12.4* 13.1*   HCT 38.6* 39.9*   MCV 93 92   MCH 29.7 30.1   MCHC 32.1 32.8   RDW 13.5 13.4    209     INRNo lab results found in last 7 days.  PHYSICAL EXAM:  BP (!) 145/76   Pulse 70   Temp 97.3  F (36.3  C) (Temporal)   Resp 21   SpO2 100%   General: The patient is alert and oriented. Appropriate. No acute distress  Psych: Pleasant affect, Answers questions appropriately  Skin: Color appropriate for race, warm, dry.  Respiratory: Breathing unlabored  GI:  Abdomen soft, nontender to light palpation.  Extremities: R groin puncture site intact     Imaging:   No new imaging.    ASSESSMENT:   71 year old year old male who has a PMH significant for diabetes with peripheral neuropathy, hyperlipidemia presented with increasing swelling and pain of his left ankle after falling from a ladder 1.5 weeks ago.  MRI imaging concerning for osteomyelitis.  ABIs show right 0.88 left 0.59 (digit 24 mmHg and adequate for wound healing) and PVRs indicating infrapopliteal disease.     S/p left lower extremity angiogram via right groin approach with distal SFA angioplasty with 6 mm DCB and AT angioplasty with 3mm balloon complicated by distal embolization  requiring penumbra CAT 3 thrombectomy and 2 mm distal AT/DP angioplasty 8/6/21.   S/p I&D L ankle w/ bone excision and biopsy 8/7/21  Foot covered in PACU  R groin intact w/ no hematoma or ecchymosis    PLAN:  Continue ASA/statin indefinitely and Plavix for 1mo     Eliana Kinney MD  Vascular Surgery Fellow  Pager: [phone]

## 2021-08-07 NOTE — PROGRESS NOTES
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 MRI was concerning for osteomyelitis of the distal fibula and pt had evidence of aterial insufficiency on MIYA. Transferred for vascular surgery consultation prior to surgical I&D.          Assessment & Plan      Left ankle wound with associated cellulitis  Osteomyelitis of distal left fibula: Pt was doing some remodeling work last week when he scraped his left anterior lower extremity and left ankle on the aluminum ladder.  He started to develop increased erythema and opening of the left ankle wound 2-3 days prior to admission.  Xray on admission with bimalleolar soft tissue swelling with no convincing evidence of osteomyelitis.  MIYA were obtained which showed BLE arterial insufficiency.  MRI obtained which shows distal fibula osteitis and concern for early osteomyelitis. Had been on vancomycin and Zosyn since admission.  Now on just zosyn.    8/7:  S/P I&D L ankle w/ bone excision and biopsy 8/7/21, no major complications reported.  Continue abx, appreciate ID and surgical service assistance.  Limited activity planned through the weekend.     Peripheral arterial disease s/p distal SFA and AT angioplasty with 3mm balloon complicated by distal embolization requiring thrombectomy and 2 mm distal AT/DP angioplasty (8/6/21): MIYA revealed moderate arterial insufficiency in the RLE and moderate to severe in the LLE.  - Vascular surgery following s/p procedure above.   - Plavix 300 mg X1 post-op with plan for 75 mg/day X3 months.  Aspirin + Statin  should be indefinitely used.  - Atorvastatin 40 mg po at bedtime      Elevated blood pressure: Noted (most elevated readings this AM intraop during angiogram). Pt without significant pain. Renal function well managed. No hx of HTN.   - Will discuss ACEI tomorrow AM with him assuming BP remains elevated  - PRN hydralazine for SBP >180      DM-2 with neuropathy, A1C 7.6: Metformin 1000 mg bid PTA  - Hold Metformin for now  - sliding scale insulin,, will see how trends into tomorrow AM as he is just starting diet advance today.             COVID Status:  COVID-19 PCR Results    COVID-19 PCR Results 8/3/21   SARS CoV2 PCR Negative      Comments are available for some flowsheets but are not being displayed.         COVID-19 Antibody Results, Testing for Immunity    COVID-19 Antibody Results, Testing for Immunity   No data to display.            Diet: Consistent Carb 60 grams CHO per Meal Diet    DVT Prophylaxis: Defer to surgical service  Manuel Catheter: Not present  Code Status: Full Code      Disposition Plan   Discharge timing unclear, needs to at least stay through the weekend.     Entered: Los Durán 08/07/2021, 6:12 PM       Interval History   Assumed care, history reviewed.  Mr. Yang felt relatively well shortly after surgery.  He was tolerating a diet advance.  Denied any major pain.  No SOB or nausea.    -Data reviewed today: I reviewed all new labs and imaging reports over the last 24 hours. I personally reviewed no images or EKG's today.    Physical Exam   Temp: 97.3  F (36.3  C) Temp src: Oral BP: 130/59 Pulse: 69   Resp: 27 SpO2: 94 % O2 Device: None (Room air) Oxygen Delivery: 2 LPM  There were no vitals filed for this visit.  Vital Signs with Ranges  Temp:  [97  F (36.1  C)-98.3  F (36.8  C)] 97.3  F (36.3  C)  Pulse:  [69-80] 69  Resp:  [10-27] 27  BP: (130-164)/(48-90) 130/59  SpO2:  [94 %-100 %] 94 %  I/O last 3 completed shifts:  In: 1500 [P.O.:600; I.V.:900]  Out: 20 [Blood:20]    GEN:   Alert, oriented x 3, appears comfortable, no overt distress.  HEENT:  Normocephalic/atraumatic, no scleral icterus, no nasal discharge, mouth moist.  CV:  Regular rate and rhythm, distant.  No loud murmur/rub.  LUNGS:  Clear to auscultation bilaterally without rales/rhonchi/wheezing/retractions.  Symmetric chest rise on inhalation noted.  ABD:  Active bowel sounds, soft, non-tender, mildly distended.  No guarding/rigidity.  EXT:  Trace edema.  No cyanosis.  Detailed surgical site exam deferred to surgical service.  SKIN:  Dry to touch.    Medications     sodium chloride 75 mL/hr at 08/07/21 1135       acetaminophen  975 mg Oral Q8H     aspirin  81 mg Oral Daily     atorvastatin  40 mg Oral QPM     clopidogrel  75 mg Oral Daily     insulin aspart  1-7 Units Subcutaneous TID AC     insulin aspart  1-5 Units Subcutaneous At Bedtime     piperacillin-tazobactam  3.375 g Intravenous Q6H     [START ON 8/8/2021] polyethylene glycol  17 g Oral Daily     senna-docusate  1 tablet Oral BID     sodium chloride (PF)  3 mL Intracatheter Q8H     Data     Recent Labs   Lab 08/07/21  1125 08/05/21  0511 08/03/21  1920   WBC 10.2 10.3 12.9*   HGB 12.2* 12.4* 13.1*   HCT 38.4* 38.6* 39.9*   MCV 93 93 92    203 209       Recent Labs   Lab 08/07/21  1657 08/07/21  1134 08/07/21  1125 08/05/21  0511 08/03/21  1920   NA  --   --  136 136 136   POTASSIUM  --   --  3.9 4.3 3.9   CHLORIDE  --   --  104 107 104   CO2  --   --  31 26 25   ANIONGAP  --   --  1* 3 7   * 181* 173* 199* 182*   BUN  --   --  11 18 27   CR  --   --  0.97 0.97 1.16   GFRESTIMATED  --   --  79 79 63   SHEILA  --   --  8.3* 8.5 8.8       Recent Results (from the past 24 hour(s))   X-ray lt Ankle G/E 3 vw port: In PACU    Narrative    XR ANKLE PORT LEFT G/E 3 VIEWS 8/7/2021 10:22 AM    HISTORY: post op state ankle pain.    COMPARISON: None.    FINDINGS:   No acute fracture. Small linear fragment of bone along the lateral  margin of the lateral malleolus. No  plain film evidence of  osteomyelitis. There is a soft tissue ulcer overlying the lateral  malleolus. Small chronic ununited fracture of the tip of the lateral  malleolus. Small chronic avulsion fracture of the tip of the medial  malleolus.    SANDRO PERDOMO MD         SYSTEM ID:  [id number]

## 2021-08-07 NOTE — PROGRESS NOTES
Therapy: IV Abx    Insurance: Medicare (Primary)  & A[employer] Supplement Plan     Pt has all Medicare products, which does not cover IV ABX in the home. (Pt would have coverage for short term TCU or IC). Below is what pt would be responsible for if pt wanted to go w  home infusion      Drug would go to Part-D (pt would be responsible for the co-pay per dispense)    Pt would have to self-pay for the per-kin (daily)    If not homebound, nursing would also be self-pay (per visit)    Here is what we are anticipating for out of pocket costs, based on the above information and medication currently being administered In Patient    Name of Medication, dose & frequency Cost of drugs & supplies per day Cost per RN visit if patient is not Homebound   Zosyn 3.375g q6h $65.43 This cost will be dependent on what the Homecare Agency charges. Could range from $90-$200     This estimate is subject to change if the recommended medication is different than what is listed above.     Please contact Intake with any questions, [phone] or In Basket pool,  Home Infusion ([phone]).    08/07/2021([facility]):- In reference to admission on 08/05/2021 to check on IV Abx coverage.

## 2021-08-08 ENCOUNTER — APPOINTMENT (OUTPATIENT)
Dept: PHYSICAL THERAPY | Facility: CLINIC | Age: 71
DRG: 623 | End: 2021-08-08
Attending: PODIATRIST
Payer: MEDICARE

## 2021-08-08 LAB
ANION GAP SERPL CALCULATED.3IONS-SCNC: 4 MMOL/L (ref 3–14)
BUN SERPL-MCNC: 15 MG/DL (ref 7–30)
CALCIUM SERPL-MCNC: 8.2 MG/DL (ref 8.5–10.1)
CHLORIDE BLD-SCNC: 105 MMOL/L (ref 94–109)
CO2 SERPL-SCNC: 27 MMOL/L (ref 20–32)
CREAT SERPL-MCNC: 1.03 MG/DL (ref 0.66–1.25)
ERYTHROCYTE [DISTWIDTH] IN BLOOD BY AUTOMATED COUNT: 13.2 % (ref 10–15)
GFR SERPL CREATININE-BSD FRML MDRD: 73 ML/MIN/1.73M2
GLUCOSE BLD-MCNC: 199 MG/DL (ref 70–99)
GLUCOSE BLDC GLUCOMTR-MCNC: 134 MG/DL (ref 70–99)
GLUCOSE BLDC GLUCOMTR-MCNC: 148 MG/DL (ref 70–99)
GLUCOSE BLDC GLUCOMTR-MCNC: 166 MG/DL (ref 70–99)
GLUCOSE BLDC GLUCOMTR-MCNC: 166 MG/DL (ref 70–99)
GLUCOSE BLDC GLUCOMTR-MCNC: 199 MG/DL (ref 70–99)
GLUCOSE BLDC GLUCOMTR-MCNC: 207 MG/DL (ref 70–99)
HCT VFR BLD AUTO: 37.6 % (ref 40–53)
HGB BLD-MCNC: 12.1 G/DL (ref 13.3–17.7)
MCH RBC QN AUTO: 29.8 PG (ref 26.5–33)
MCHC RBC AUTO-ENTMCNC: 32.2 G/DL (ref 31.5–36.5)
MCV RBC AUTO: 93 FL (ref 78–100)
PLATELET # BLD AUTO: 277 10E3/UL (ref 150–450)
POTASSIUM BLD-SCNC: 4 MMOL/L (ref 3.4–5.3)
RBC # BLD AUTO: 4.06 10E6/UL (ref 4.4–5.9)
SODIUM SERPL-SCNC: 136 MMOL/L (ref 133–144)
WBC # BLD AUTO: 12.2 10E3/UL (ref 4–11)

## 2021-08-08 PROCEDURE — 85027 COMPLETE CBC AUTOMATED: CPT | Performed by: INTERNAL MEDICINE

## 2021-08-08 PROCEDURE — 36415 COLL VENOUS BLD VENIPUNCTURE: CPT | Performed by: INTERNAL MEDICINE

## 2021-08-08 PROCEDURE — 250N000011 HC RX IP 250 OP 636: Performed by: PODIATRIST

## 2021-08-08 PROCEDURE — 80048 BASIC METABOLIC PNL TOTAL CA: CPT | Performed by: INTERNAL MEDICINE

## 2021-08-08 PROCEDURE — 97116 GAIT TRAINING THERAPY: CPT | Mod: GP

## 2021-08-08 PROCEDURE — 250N000013 HC RX MED GY IP 250 OP 250 PS 637: Performed by: PODIATRIST

## 2021-08-08 PROCEDURE — 99232 SBSQ HOSP IP/OBS MODERATE 35: CPT | Performed by: INTERNAL MEDICINE

## 2021-08-08 PROCEDURE — 120N000001 HC R&B MED SURG/OB

## 2021-08-08 PROCEDURE — L4360 PNEUMAT WALKING BOOT PRE CST: HCPCS

## 2021-08-08 PROCEDURE — 258N000003 HC RX IP 258 OP 636: Performed by: PODIATRIST

## 2021-08-08 PROCEDURE — 97161 PT EVAL LOW COMPLEX 20 MIN: CPT | Mod: GP

## 2021-08-08 RX ADMIN — ASPIRIN 81 MG: 81 TABLET, COATED ORAL at 07:43

## 2021-08-08 RX ADMIN — ACETAMINOPHEN 975 MG: 325 TABLET, FILM COATED ORAL at 19:04

## 2021-08-08 RX ADMIN — INSULIN ASPART 2 UNITS: 100 INJECTION, SOLUTION INTRAVENOUS; SUBCUTANEOUS at 11:52

## 2021-08-08 RX ADMIN — CLOPIDOGREL BISULFATE 75 MG: 75 TABLET ORAL at 07:43

## 2021-08-08 RX ADMIN — SODIUM CHLORIDE: 9 INJECTION, SOLUTION INTRAVENOUS at 01:32

## 2021-08-08 RX ADMIN — ATORVASTATIN CALCIUM 40 MG: 40 TABLET, FILM COATED ORAL at 21:24

## 2021-08-08 RX ADMIN — ACETAMINOPHEN 975 MG: 325 TABLET, FILM COATED ORAL at 11:56

## 2021-08-08 RX ADMIN — PIPERACILLIN SODIUM AND TAZOBACTAM SODIUM 3.38 G: 3; .375 INJECTION, POWDER, LYOPHILIZED, FOR SOLUTION INTRAVENOUS at 13:45

## 2021-08-08 RX ADMIN — PIPERACILLIN SODIUM AND TAZOBACTAM SODIUM 3.38 G: 3; .375 INJECTION, POWDER, LYOPHILIZED, FOR SOLUTION INTRAVENOUS at 19:04

## 2021-08-08 RX ADMIN — PIPERACILLIN SODIUM AND TAZOBACTAM SODIUM 3.38 G: 3; .375 INJECTION, POWDER, LYOPHILIZED, FOR SOLUTION INTRAVENOUS at 01:10

## 2021-08-08 RX ADMIN — PIPERACILLIN SODIUM AND TAZOBACTAM SODIUM 3.38 G: 3; .375 INJECTION, POWDER, LYOPHILIZED, FOR SOLUTION INTRAVENOUS at 06:45

## 2021-08-08 RX ADMIN — ACETAMINOPHEN 975 MG: 325 TABLET, FILM COATED ORAL at 03:06

## 2021-08-08 RX ADMIN — INSULIN ASPART 1 UNITS: 100 INJECTION, SOLUTION INTRAVENOUS; SUBCUTANEOUS at 07:44

## 2021-08-08 RX ADMIN — SODIUM CHLORIDE: 9 INJECTION, SOLUTION INTRAVENOUS at 18:02

## 2021-08-08 ASSESSMENT — ACTIVITIES OF DAILY LIVING (ADL)
ADLS_ACUITY_SCORE: 19

## 2021-08-08 NOTE — PLAN OF CARE
POD1 of L ankle I&D and bone excision. A&Ox4. VSS on RA. Spot checking O2 as pt was talking it off overnight. Scheduled tylenol and PRN oxycodone given for pain. SBA and pivots to BSC, partial WB on LLE. . PIV infusing with intermittent antibiotics. CMS intact, dressing CDI, elevated on pillow. Groin site R CDI. Plan for today, ortho to bring boot, PT to see pt, WOC to place a wound vac on Monday. Discharge pending.

## 2021-08-08 NOTE — PROGRESS NOTES
[facility]  Hospitalist Progress Note  Name: Rangel Yagn    MRN: [id number]  Physician:  Los Durán DO, FHM (Text Page)  Securely message with the Vocera Web Console (learn more here)     Summary of Stay:  Rangel Yang is a 71 year old male with a history of T2DM with peripheral neuropathy and previous left 5th toe amputation who was initially admitted 8/3/21 to [facility] for evaluation and treatment of a left ankle wound and cellulitis. MRI was concerning for osteomyelitis of the distal fibula and pt had evidence of aterial insufficiency on MIYA. Transferred for vascular surgery consultation prior to surgical I&D.          Assessment & Plan      Left ankle wound with associated cellulitis  Osteomyelitis of distal left fibula: Pt was doing some remodeling work last week when he scraped his left anterior lower extremity and left ankle on the aluminum ladder.  He started to develop increased erythema and opening of the left ankle wound 2-3 days prior to admission.  Xray on admission with bimalleolar soft tissue swelling with no convincing evidence of osteomyelitis.  MIYA were obtained which showed BLE arterial insufficiency.  MRI obtained which shows distal fibula osteitis and concern for early osteomyelitis. Had been on vancomycin and Zosyn since admission.  Now on just zosyn.    8/7:  S/P I&D L ankle w/ bone excision and biopsy 8/7/21, no major complications reported.  Continue abx, appreciate ID and surgical service assistance.  Limited activity planned through the weekend.    8/8:  Doing well, pain controlled.  Continues on zosyn IV.  ID following and had planned to reassess after the weekend.  Await cultures.  Podiatry does not feel that all infection necessarily removed.  He may need a longer course of abx.  Patient remains on limited activity as he is planned for wound vac placement tomorrow.  Will defer local surgical site care and activity restrictions to  Podiatry.     Peripheral arterial disease s/p distal SFA and AT angioplasty with 3mm balloon complicated by distal embolization requiring thrombectomy and 2 mm distal AT/DP angioplasty (8/6/21): MIYA revealed moderate arterial insufficiency in the RLE and moderate to severe in the LLE.  - Vascular surgery following s/p procedure above.   - Plavix 300 mg X1 post-op with plan for 75 mg/day X 3 months.  Aspirin + Statin should be indefinitely used.  - Atorvastatin 40 mg po at bedtime      Elevated blood pressure:   Pt without significant pain. Renal function well managed. No hx of HTN.   - BP trending down today into 130's systolic.  Hold on adding another agent today.  If trends back up into tomorrow consider adding ACEI.  - PRN hydralazine for SBP >180      Hyperlipidemia:  -  Statin added given vascular disease.  Recommend to continue at discharge.    DM-2 with neuropathy, A1C 7.6: Metformin 1000 mg bid PTA  - Hold Metformin for now  - sliding scale insulin today. Consider resuming metformin tomorrow if he continues improving.  If glu trend up consider low dose lantus tomorrow.      Deconditioning:  -  Therapy         COVID Status:  COVID-19 PCR Results    COVID-19 PCR Results 8/3/21   SARS CoV2 PCR Negative      Comments are available for some flowsheets but are not being displayed.         COVID-19 Antibody Results, Testing for Immunity    COVID-19 Antibody Results, Testing for Immunity   No data to display.            Diet: Consistent Carb 60 grams CHO per Meal Diet    DVT Prophylaxis: Defer to surgical service (consider heparin/lovenox tomorrow if mobility not improving after wound vac)  Manuel Catheter: Not present  Code Status: Full Code      Disposition Plan   Discharge timing unclear, he hopes to discharge in the next couple days.  Discharge plan still needs clarity of antibiotic needs and mobility.     Entered: Los Durán 08/08/2021, 3:24 PM       Interval History   Mr. Yang feels well today.  Main  complaints are around a shared room/trouble sleeping in the hospital.  He also does not like the food.  No chest pain, SOB, nausea.  Pain controlled at surgical site.    -Data reviewed today: I reviewed all new labs and imaging reports over the last 24 hours. I personally reviewed no images or EKG's today.    Physical Exam   Temp: 97.2  F (36.2  C) Temp src: Oral BP: 137/50 Pulse: 71   Resp: 16 SpO2: 99 % O2 Device: None (Room air)    There were no vitals filed for this visit.  Vital Signs with Ranges  Temp:  [97.1  F (36.2  C)-97.3  F (36.3  C)] 97.2  F (36.2  C)  Pulse:  [69-77] 71  Resp:  [16-27] 16  BP: (130-137)/(50-60) 137/50  SpO2:  [94 %-99 %] 99 %  I/O last 3 completed shifts:  In: 960 [P.O.:960]  Out: 400 [Urine:400]    GEN:  Alert, oriented x 3, appears comfortable, no overt distress.  HEENT:  Normocephalic/atraumatic, no scleral icterus, no nasal discharge, mouth moist.  CV:  Regular rate and rhythm, distant.  No loud murmur/rub.  LUNGS:  Clear to auscultation bilaterally without rales/rhonchi/wheezing/retractions.  Symmetric chest rise on inhalation noted.  ABD:  Active bowel sounds, soft, non-tender, mildly distended.  No guarding/rigidity.  EXT:  Trace edema.  Detailed surgical site exam deferred to surgical service.  SKIN:  Dry to touch.    Medications     sodium chloride 75 mL/hr at 08/08/21 0132       acetaminophen  975 mg Oral Q8H     aspirin  81 mg Oral Daily     atorvastatin  40 mg Oral QPM     clopidogrel  75 mg Oral Daily     insulin aspart  1-7 Units Subcutaneous TID AC     insulin aspart  1-5 Units Subcutaneous At Bedtime     piperacillin-tazobactam  3.375 g Intravenous Q6H     polyethylene glycol  17 g Oral Daily     senna-docusate  1 tablet Oral BID     sodium chloride (PF)  3 mL Intracatheter Q8H     Data     Recent Labs   Lab 08/08/21  1140 08/07/21  1125 08/05/21  0511   WBC 12.2* 10.2 10.3   HGB 12.1* 12.2* 12.4*   HCT 37.6* 38.4* 38.6*   MCV 93 93 93    257 203       Recent  Labs   Lab 08/08/21  1140 08/08/21  1133 08/08/21  0739 08/07/21  1125 08/05/21  0511     --   --  136 136   POTASSIUM 4.0  --   --  3.9 4.3   CHLORIDE 105  --   --  104 107   CO2 27  --   --  31 26   ANIONGAP 4  --   --  1* 3   * 207* 148* 173* 199*   BUN 15  --   --  11 18   CR 1.03  --   --  0.97 0.97   GFRESTIMATED 73  --   --  79 79   SHEILA 8.2*  --   --  8.3* 8.5       No results found for this or any previous visit (from the past 24 hour(s)).

## 2021-08-08 NOTE — CONSULTS
Care Management Initial Consult    General Information  Assessment completed with: Children,  (daughter Leonora called writer)       Primary Care Provider verified and updated as needed: Yes   Readmission within the last 30 days: planned readmission   Return Category: New Diagnosis  Reason for Consult: discharge planning  Advance Care Planning:            Communication Assessment  Patient's communication style: spoken language (English or Bilingual)    Hearing Difficulty or Deaf: no   Wear Glasses or Blind: yes    Cognitive  Cognitive/Neuro/Behavioral: WDL                      Living Environment:   People in home: alone     Current living Arrangements: house      Able to return to prior arrangements: yes  Living Arrangement Comments:  [place] out of [place]    Family/Social Support:  Care provided by: self  Provides care for: no one  Marital Status: Single  Children          Description of Support System: Supportive, Involved    Support Assessment: Adequate family and caregiver support    Current Resources:   Patient receiving home care services:       Community Resources:    Equipment currently used at home: none  Supplies currently used at home:      Employment/Financial:  Employment Status:          Financial Concerns:             Lifestyle & Psychosocial Needs:  Social Determinants of Health     Tobacco Use: Medium Risk     Smoking Tobacco Use: Former Smoker     Smokeless Tobacco Use: Never Used   Alcohol Use:      Frequency of Alcohol Consumption:      Average Number of Drinks:      Frequency of Binge Drinking:    Financial Resource Strain:      Difficulty of Paying Living Expenses:    Food Insecurity:      Worried About Running Out of Food in the Last Year:      Ran Out of Food in the Last Year:    Transportation Needs:      Lack of Transportation (Medical):      Lack of Transportation (Non-Medical):    Physical Activity:      Days of Exercise per Week:      Minutes of Exercise per Session:    Stress:       Feeling of Stress :    Social Connections:      Frequency of Communication with Friends and Family:      Frequency of Social Gatherings with Friends and Family:      Attends Shinto Services:      Active Member of Clubs or Organizations:      Attends Club or Organization Meetings:      Marital Status:    Intimate Partner Violence:      Fear of Current or Ex-Partner:      Emotionally Abused:      Physically Abused:      Sexually Abused:    Depression:      PHQ-2 Score:    Housing Stability:      Unable to Pay for Housing in the Last Year:      Number of Places Lived in the Last Year:      Unstable Housing in the Last Year:        Functional Status:  Prior to admission patient needed assistance:              Mental Health Status:          Chemical Dependency Status:                Values/Beliefs:  Spiritual, Cultural Beliefs, Shinto Practices, Values that affect care:                 Additional Information:  Writer received a phone call from patient's daughter Leonora who lives in [place].  [place] lives out of state.  Leonora is primary care giver to her dad and is willing to have him come to her home at discharge, however she and her  both work full time and Rangel would be alone much of the time.  Leonora mentioned a TCU might be a good alternative given Rangel's IV antibiotics, wound vac and mobility.  Leonora is also concerned about timing of discharge since she works.  Writer explained there are still unknows r/t IV abx plan and type of wound vac the pt needs.  Also PT is recommending Rangel go home, but given the fact that he would be alone most of the time and the need for IV abx and possibly wound vac dressing changes the TCU may be a good option.  Explained to Leonora we will know more tomorrow and CC will be able to make TCU or Home infusion referrals, depending on the ID and podietry plans.  Leonora would like to be called to be involved in decision making. [phone].    Rosalba Winter,  RN

## 2021-08-08 NOTE — PROGRESS NOTES
S:  We received an order through Epic for a short Aircast boot for patient in room 8831-01.  O:  I met with the patient in room 831-01 and he is in bed alert.  I fit the patient with a short medium Aircast boot.    A:  The fit of the Aircast boot is adequate.  I gave the patient the wear and care instructions.  P:  The patient will wear the Aircast boot following Physician guidelines.  The patient will contact us prn.  David SHAH

## 2021-08-08 NOTE — PLAN OF CARE
POD 0 of I&D, bone excision of L ankle. A/O, VSS on RA. Pt refused capno, cont pulse ox in place. Denies pain. Pt bedrest, but able to dangle feet. Up SBA, pivots to BSC, partial WB on LLE. Good appetite, tolerating mod carb diet. BG checks w/ sliding scale insulin given. Voiding adequately, pt had loose BM this shift. PIV infusing NS at 75ml/hr, w/ int and. LLE dressing CDI, CMS intact, elevated on 2 pillows. R groin site CDI. Plan for orthotics to bring boot, PT to see, and WOC to place wound vac.

## 2021-08-08 NOTE — PLAN OF CARE
Orientation: A/Ox4  Aggression Stop Light: green  Mobility: PWB on LLE, ortho boot on, uses walker  Pain Management: no pain meds given-reported his left ankle is sore but doesn't hurt  Diet: Mod carb diet-good appetite  Bowel/Bladder: up to the bathroom  Abnormal Lab/Assessments:   and 207  Drain/Device/Wound: L outer ankle wound, PIV  Consults: Vascular, ID, podiatry  D/C Day/Goals/Place: pending consults.    Shift Note:  POD1. PT able to see patient and instruct him on how to ambulate with his ortho boot. Left foot/ankle dressing c/d/I, elevated on 2 pillows. Wound vac to be applied to left ankle tomorrow.

## 2021-08-08 NOTE — PROGRESS NOTES
Podiatry / Foot and Ankle Surgery Progress Note    August 8, 2021    Subject: Patient was seen at bedside.  Notes that he has had some pain to the left ankle. Has been keeping in elevated.     Objective:  Vitals: /50 (BP Location: Right arm)   Pulse 71   Temp 97.2  F (36.2  C) (Oral)   Resp 16   SpO2 99%   BMI= There is no height or weight on file to calculate BMI.     WBC   Date Value Ref Range Status   04/22/2010 12.7 (H) 4.0 - 11.0 10e9/L Final     WBC Count   Date Value Ref Range Status   08/07/2021 10.2 4.0 - 11.0 10e3/uL Final     A1c: 7.6    General:  Patient is alert and orientated.  NAD.    Vascular:  DP and PT pulses are faintly palpable.  Moderate edema to left leg with minimal varicosities noted.  CFT's < 3secs.  Skin temp is normal.    Neuro:  Light and gross touch sensation diminished to feet.    Derm:  Dressing is c/d/i. Sutures intact. Wound is not completely closed. Small area measuring 1.5cm 1.2cm x 0.4cm.  Tendon is visible. No redeness, purulent drainage noted. Heavy amounts of clear serous drainage noted.     Musculoskeletal:  No foot deformity noted.      Imaging: left ankle post op xrays - No acute fracture. Small linear fragment of bone along the lateral margin of the lateral malleolus. No plain film evidence of osteomyelitis. There is a soft tissue ulcer overlying the lateral malleolus. Small chronic ununited fracture of the tip of the lateral malleolus. Small chronic avulsion fracture of the tip of the medial malleolus.    Cultures:  pending    Assessment: 71 yr old diabetic male with PAD, s/p I&D and bone debridement left ankle with antibiotic cement placed.     Plan:    -POD#1  -Dressing changed at bedside.   -Keep foot and dressing dry.   -wound vac ordered. Likely put on tomorrow by wound nurses. Paperwork for vac in paperchart.   -minimal weight bearing in post op boot left foot.   -Not sure all bone infection is removed. Likely need IV antibiotics. ID recs appreciated.    -Dr. Harvey will follow up on patient tomorrow.       Kina Whitley DPM, Podiatry/Foot and Ankle Surgery  8:55 AM

## 2021-08-08 NOTE — PROGRESS NOTES
08/08/21 1300   Quick Adds   Type of Visit Initial PT Evaluation   Living Environment   People in home child(yolanda), adult   Current Living Arrangements house  (pt lives in [place] Staying at daughters to sell the house. )   Home Accessibility stairs within home   Number of Stairs, Within Home, Primary 10   Stair Railings, Within Home, Primary railing on left side (ascending)   Transportation Anticipated family or friend will provide   Living Environment Comments Pt currently at daughter's house while they prep to sell. 10 steps to get to main living area, or from back patio door no steps.   Self-Care   Usual Activity Tolerance good   Current Activity Tolerance moderate   Regular Exercise Yes   Activity/Exercise Type   (hiking etc)   Equipment Currently Used at Home none   Activity/Exercise/Self-Care Comment Pt was IND in all ADLs and MRADLs   Disability/Function   Fall history within last six months yes   Number of times patient has fallen within last six months 1   General Information   Onset of Illness/Injury or Date of Surgery 08/05/21   Referring Physician JACOB Valenzuela   Patient/Family Therapy Goals Statement (PT) To get out of here and get a good night's sleep   Pertinent History of Current Problem (include personal factors and/or comorbidities that impact the POC) Pt is a 71 YOM POD 1 L ankle I&D and bone excision. POD #2 distal SFA and AT angioplasty with 3mm balloon complicated by distal embolization requiring thrombectomy and 2 mm distal AT/DP angioplasty. PMH includes: type 2 DM, peripherial neuropthy and prior L toe amputation.   Existing Precautions/Restrictions fall;weight bearing   Weight-Bearing Status - LLE other (see comments)  (min WB L foot in post op boot)   General Observations Pt irritated about being bothered.   Cognition   Orientation Status (Cognition) oriented x 3   Affect/Mental Status (Cognition) agitated   Follows Commands (Cognition) WFL   Pain Assessment   Patient Currently in  Pain Yes, see Vital Sign flowsheet   Integumentary/Edema   Integumentary/Edema Comments Boot donned over dressing. R LE no deficits identified.   Posture    Posture Not impaired   Range of Motion (ROM)   ROM Comment WFL for mobiltiy, L ankle not tested   Strength   Strength Comments 5/5 B hip flexion, knee ext, R Df   Bed Mobility   Comment (Bed Mobility) mod I supine > sit   Transfers   Transfer Safety Comments SBA sit to stand at FWW, difficulty maintaining min WB status   Gait/Stairs (Locomotion)   Assistive Device (Gait) walker, front-wheeled   Distance in Feet (Required for LE Total Joints) 30   Pattern (Gait) swing-through   Maintains Weight-bearing Status (Gait) verbal cues to maintain   Comment (Gait/Stairs) pt ambulates with FWW, initially noted to put moderate pressure through foot during step and SBA for safety. Pt insistent that he will butt scoot up stairs and that he has done it prior many times as it is the safest way.   Balance   Balance Comments good with UE support on FWW   Sensory Examination   Sensory Perception Comments numbness/tingling as related to peripheral neuropathy B LE   Clinical Impression   Criteria for Skilled Therapeutic Intervention yes, treatment indicated   PT Diagnosis (PT) impaired gait   Influenced by the following impairments WB status, pain, decreased activity tolerance   Functional limitations due to impairments impaired gait requiring assist of 1 and device   Clinical Presentation Stable/Uncomplicated   Clinical Presentation Rationale Pt presentation and clinical judgement   Clinical Decision Making (Complexity) low complexity   Therapy Frequency (PT) Daily   Predicted Duration of Therapy Intervention (days/wks) 3 days   Planned Therapy Interventions (PT) gait training;stair training;transfer training;strengthening   Anticipated Equipment Needs at Discharge (PT) walker, rolling   Risk & Benefits of therapy have been explained evaluation/treatment results reviewed;care  plan/treatment goals reviewed;risks/benefits reviewed;current/potential barriers reviewed;participants voiced agreement with care plan;participants included;patient   Clinical Impression Comments Pt requires VC to maintain WB status with gait. Insistent that he will butt scoot up stairs, declines practicing them during hospital stay.   PT Discharge Planning    PT Discharge Recommendation (DC Rec) home with assist   PT Rationale for DC Rec Pt with mild difficulty maintaining min WB status L LE, improved with cues. Will require assist on stairs upon discharge. Anticipate gait and transfers with FWW to continue to improve with hospital stay and practice, may require supervision.   PT Brief overview of current status  Mod I bed mobiltiy. SBA transfers and gait x30' with FWW to cue for maintaining min WB status.   Total Evaluation Time   Total Evaluation Time (Minutes) 10

## 2021-08-09 ENCOUNTER — APPOINTMENT (OUTPATIENT)
Dept: PHYSICAL THERAPY | Facility: CLINIC | Age: 71
DRG: 623 | End: 2021-08-09
Attending: INTERNAL MEDICINE
Payer: MEDICARE

## 2021-08-09 LAB
BACTERIA BLD CULT: NO GROWTH
BACTERIA BLD CULT: NO GROWTH
BACTERIA TISS BX CULT: ABNORMAL
BACTERIA TISS BX CULT: ABNORMAL
GLUCOSE BLDC GLUCOMTR-MCNC: 131 MG/DL (ref 70–99)
GLUCOSE BLDC GLUCOMTR-MCNC: 153 MG/DL (ref 70–99)
GLUCOSE BLDC GLUCOMTR-MCNC: 153 MG/DL (ref 70–99)
GLUCOSE BLDC GLUCOMTR-MCNC: 154 MG/DL (ref 70–99)
GLUCOSE BLDC GLUCOMTR-MCNC: 167 MG/DL (ref 70–99)
GLUCOSE BLDC GLUCOMTR-MCNC: 208 MG/DL (ref 70–99)

## 2021-08-09 PROCEDURE — 250N000011 HC RX IP 250 OP 636: Performed by: PODIATRIST

## 2021-08-09 PROCEDURE — 120N000001 HC R&B MED SURG/OB

## 2021-08-09 PROCEDURE — 97605 NEG PRS WND THER DME<=50SQCM: CPT

## 2021-08-09 PROCEDURE — 250N000013 HC RX MED GY IP 250 OP 250 PS 637: Performed by: PODIATRIST

## 2021-08-09 PROCEDURE — 250N000011 HC RX IP 250 OP 636: Performed by: INTERNAL MEDICINE

## 2021-08-09 PROCEDURE — 250N000013 HC RX MED GY IP 250 OP 250 PS 637: Performed by: HOSPITALIST

## 2021-08-09 PROCEDURE — 97530 THERAPEUTIC ACTIVITIES: CPT | Mod: GP | Performed by: PHYSICAL THERAPIST

## 2021-08-09 PROCEDURE — 258N000003 HC RX IP 258 OP 636: Performed by: PODIATRIST

## 2021-08-09 PROCEDURE — 97116 GAIT TRAINING THERAPY: CPT | Mod: GP | Performed by: PHYSICAL THERAPIST

## 2021-08-09 PROCEDURE — G0463 HOSPITAL OUTPT CLINIC VISIT: HCPCS | Mod: 25

## 2021-08-09 PROCEDURE — 99233 SBSQ HOSP IP/OBS HIGH 50: CPT | Performed by: HOSPITALIST

## 2021-08-09 RX ORDER — LISINOPRIL 5 MG/1
5 TABLET ORAL DAILY
Status: DISCONTINUED | OUTPATIENT
Start: 2021-08-09 | End: 2021-08-11 | Stop reason: HOSPADM

## 2021-08-09 RX ORDER — PIPERACILLIN SODIUM, TAZOBACTAM SODIUM 4; .5 G/20ML; G/20ML
4.5 INJECTION, POWDER, LYOPHILIZED, FOR SOLUTION INTRAVENOUS EVERY 6 HOURS
Status: DISCONTINUED | OUTPATIENT
Start: 2021-08-09 | End: 2021-08-10

## 2021-08-09 RX ADMIN — INSULIN ASPART 2 UNITS: 100 INJECTION, SOLUTION INTRAVENOUS; SUBCUTANEOUS at 12:39

## 2021-08-09 RX ADMIN — ACETAMINOPHEN 975 MG: 325 TABLET, FILM COATED ORAL at 19:51

## 2021-08-09 RX ADMIN — CLOPIDOGREL BISULFATE 75 MG: 75 TABLET ORAL at 08:16

## 2021-08-09 RX ADMIN — PIPERACILLIN SODIUM AND TAZOBACTAM SODIUM 4.5 G: 4; .5 INJECTION, POWDER, LYOPHILIZED, FOR SOLUTION INTRAVENOUS at 14:22

## 2021-08-09 RX ADMIN — SODIUM CHLORIDE: 9 INJECTION, SOLUTION INTRAVENOUS at 10:36

## 2021-08-09 RX ADMIN — ATORVASTATIN CALCIUM 40 MG: 40 TABLET, FILM COATED ORAL at 19:51

## 2021-08-09 RX ADMIN — PIPERACILLIN SODIUM AND TAZOBACTAM SODIUM 4.5 G: 4; .5 INJECTION, POWDER, LYOPHILIZED, FOR SOLUTION INTRAVENOUS at 19:52

## 2021-08-09 RX ADMIN — ASPIRIN 81 MG: 81 TABLET, COATED ORAL at 08:16

## 2021-08-09 RX ADMIN — LISINOPRIL 5 MG: 5 TABLET ORAL at 19:51

## 2021-08-09 RX ADMIN — ACETAMINOPHEN 975 MG: 325 TABLET, FILM COATED ORAL at 12:39

## 2021-08-09 RX ADMIN — PIPERACILLIN SODIUM AND TAZOBACTAM SODIUM 3.38 G: 3; .375 INJECTION, POWDER, LYOPHILIZED, FOR SOLUTION INTRAVENOUS at 08:16

## 2021-08-09 RX ADMIN — INSULIN ASPART 1 UNITS: 100 INJECTION, SOLUTION INTRAVENOUS; SUBCUTANEOUS at 08:17

## 2021-08-09 RX ADMIN — PIPERACILLIN SODIUM AND TAZOBACTAM SODIUM 3.38 G: 3; .375 INJECTION, POWDER, LYOPHILIZED, FOR SOLUTION INTRAVENOUS at 00:13

## 2021-08-09 RX ADMIN — ACETAMINOPHEN 975 MG: 325 TABLET, FILM COATED ORAL at 05:04

## 2021-08-09 ASSESSMENT — ACTIVITIES OF DAILY LIVING (ADL)
ADLS_ACUITY_SCORE: 19

## 2021-08-09 NOTE — PROGRESS NOTES
"[facility] PODIATRY/FOOT & ANKLE SURGERY    Subjective:  Reports not getting good sleep the past four days. Has concerns regarding insurance coverage.      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  Aircast on.    Pathology: Pending.    Assessment:   71-year-old male type 2 diabetes, peripheral neuropathy, peripheral arterial disease status post ulcer excision with flap closure and left ankle fibular bone excision with biopsy and deep cultures.  This was done by Dr. Whiltey on 8/7/2021.    Plan:   I Appreciate the assistance by case management regarding discharge planning.  The Wound VAC has been placed. Paperwork completed by Dr. Whitley.  I answered some questions that Rangel had.  Foot/ankle relevant discharge orders are placed.  Weightbearing status is to be partial weightbearing in the Aircast while using a walker. The definition of \"partial\" is the least amount of weight yet safe (per PT)  Infectious Disease consult note reviewed    Podiatry will sign off at this time.  Thank you for involving us in his care.  Please reach out with any questions or concerns.    Angel Harvey DPM, FACFAS, MS  [facility] Department of Podiatry/Foot & Ankle Surgery  [phone]        "

## 2021-08-09 NOTE — PLAN OF CARE
Pt A/Ox4.  Vitally stable.  Up with Ax1, walker, and gait belt to bathroom.  CMS intact in LLE.  Denies pain.  Plan for wound vac placement tomorrow and then possible discharge on long term abx (ID to see).

## 2021-08-09 NOTE — PROGRESS NOTES
PT AO4 VSS, on RA, BP elevated, PRN BP  meds available, LLE wound vac. Baseline numbness and tingling.  SBA with walker to BR. ID following,plan to  discharge home on IV abxing, ambulated outside the room with Therapy,  Good appetite, calm and co operative most of the time, frustrated at times.

## 2021-08-09 NOTE — PROGRESS NOTES
Care Management Follow Up    Length of Stay (days): 4    Expected Discharge Date: 08/11/2021     Concerns to be Addressed:    TCU vs Daughters home  Patient plan of care discussed at interdisciplinary rounds: Yes    Anticipated Discharge Disposition: Transitional Care, Home  Disposition Comments: Dtrs home vs TCU  Anticipated Discharge Services:    Anticipated Discharge DME:      Patient/family educated on Medicare website which has current facility and service quality ratings: yes  Education Provided on the Discharge Plan:    Patient/Family in Agreement with the Plan:      Referrals Placed by CM/SW:    Private pay costs discussed: insurance costs out of pocket expenses    Additional Information:  Met with pt to discuss discharge plans.  Discussed pt discharging home with a Wound VAC and IV abx.  Patient stated he is hoping to go home to his daughter's home [id number] [facility] [address].  Discussed with pt that home care RN would be needed for wound VAC dressing changes and that PT recommends home care.  Discussed with Dr. Harvey that came in to see pt and he stated pt would be able to toe touch and ambulate up and down steps with CAM boot on and hold stairway rail.  Discussed that pt could go home on IV abx-For Zosyn 3.375g q6h the cost would be $65.43 / day for medication and supplies. If patient is not homebound, the cost for home RN would be dependent upon what the home care agency charges - costs range from $90-$200 per visit.   Patient stated that he would not be leaving the house other than going to the doctor.  Discussed home care agencies and pt was ok with any agency that could start right away with him.    Discussed Outpt IV abx at an Infusion Center as ID Provider stated there would be a daily option for IV abx at discharge.  Patient asked length of IV abx at discharge.  Informed pt that ID Provider would be called with this question.    Submitted online with [employer] for home wound VAC and  faxed signed Rx for wound VAC and requested chart information to [phone].  Anticipate home with home IV abx and Home Care RN, PT/OT and wound VAC dressing change support once set up and confirmed.  Care Coordinator will continue to follow for discharge needs.    Addendum:  Per Dr. Arora pt will need 2-3 weeks of IV abx at discharge.  Dr. Arora anticipates pt would discharge on Rocephin 2gm daily.  Per [facility] cost for pt would be roughly $33.19/day for the ceftriaxone and supplies.    Care Coordinator will continue to follow for discharge needs.    RUTHIE Malagon RN, BSN, OCN   Inpatient Care Coordination [address]  Office: [phone]

## 2021-08-09 NOTE — DISCHARGE INSTRUCTIONS
L lateral malleolus wound:   Negative Pressure Wound Therapy (NPWT) to be changed by RN every Mon/Wed/Fri with medium black foam, with Aylin over tendon and adapticcovering wound bed. RN to assess pump settings set to -125 and dressing integrity every shift. Remove foam dressing and replace with BID normal saline moist gauze dressing if:  -a dressing failure which cannot be repaired within 2 hours  -patient is discharging to home without a home pump  -patient is discharging to a facility outside the local area    Nursing to notify the Provider(s) and re-consult the [facility] Nurse if wound(s) deteriorate(s) or if necessary to reevaluate the plan.

## 2021-08-09 NOTE — PLAN OF CARE
"PT AO4, agitated about the \"toilets being so low\" and wants to \"get out of here\".  Dressing on LLE CDI.  Baseline numbness and tingling.  SBA W to BR.  Plan for ID to see today and for WV to be placed, pt would like to know when WOC is coming to see him so that daughter can be there when WV is placed.    "

## 2021-08-09 NOTE — PROGRESS NOTES
[facility]    Infectious Disease Progress Note    Date of Service (when I saw the patient): 08/09/2021     Assessment & Plan   Rangel Yang is a 71 year old male who was admitted on 8/5/2021.     IMPRESSION:     1.  A 71-year-old male with underlying diabetes and probably peripheral vascular disease, prior recurrent left foot problems, now with recent traumatic lateral foot wound and cellulitis without major clinical sepsis.  MRI scan shows the wound tracks deep to bone, but unlikely by history to be osteomyelitis.  2.  Prior significant left lateral foot wound distal to the current area treated in [place] with extensive antibiotics and I and D.  Imaging without obvious infection at that site.  3.  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  4.  Diabetes mellitus.  5.  s/p S/p left lower extremity angiogram via right groin approach with distal SFA angioplasty with 6 mm DCB and AT angioplasty with 3mm balloon complicated by distal embolization requiring penumbra CAT 3 thrombectomy and 2 mm distal AT/DP angioplasty     RECOMMENDATIONS:     1.  continue on zosyn alone, reassess as cultures are reported. GNR now on op cx  2.  Note Podiatry op.  cx ful info and path biopsy the distal bone pending, likely deep liz with cx + and thus aggressive ABX  3.   significant peripheral vascular disease with intervention noted  4 Care coordinators following, ? Rehab given ins/wd etc vs outpt, vs home IV, if sens GNR only possibly some IV then extended po    Amos Arora MD    Interval History   S/p above mentioned procedure   Afebrile   No new complaints   Pain OK  cx GNR    Physical Exam   Temp: 97  F (36.1  C) Temp src: Oral BP: (!) 169/90 Pulse: 75   Resp: 16 SpO2: 97 % O2 Device: None (Room air)    There were no vitals filed for this visit.  Vital Signs with Ranges  Temp:  [96.7  F (35.9  C)-97.5  F (36.4  C)] 97  F (36.1  C)  Pulse:  [67-75] 75  Resp:  [16-18] 16  BP: (132-169)/(65-90)  169/90  SpO2:  [97 %-98 %] 97 %    Constitutional: Awake, alert, cooperative, no apparent distress  Lungs: Clear to auscultation bilaterally, no crackles or wheezing  Cardiovascular: Regular rate and rhythm, normal S1 and S2, and no murmur noted  Abdomen: Normal bowel sounds, soft, non-distended, non-tender  Skin: No rashes, no cyanosis, no edema  Other:    Medications     sodium chloride 75 mL/hr at 08/09/21 1036       acetaminophen  975 mg Oral Q8H     aspirin  81 mg Oral Daily     atorvastatin  40 mg Oral QPM     clopidogrel  75 mg Oral Daily     insulin aspart  1-7 Units Subcutaneous TID AC     insulin aspart  1-5 Units Subcutaneous At Bedtime     piperacillin-tazobactam  3.375 g Intravenous Q6H     polyethylene glycol  17 g Oral Daily     senna-docusate  1 tablet Oral BID     sodium chloride (PF)  3 mL Intracatheter Q8H       Data   All microbiology laboratory data reviewed.  Recent Labs   Lab Test 08/08/21  1140 08/07/21  1125 08/05/21  0511   WBC 12.2* 10.2 10.3   HGB 12.1* 12.2* 12.4*   HCT 37.6* 38.4* 38.6*   MCV 93 93 93    257 203     Recent Labs   Lab Test 08/08/21  1140 08/07/21  1125 08/05/21  0511   CR 1.03 0.97 0.97     Recent Labs   Lab Test 08/04/21  0753   SED 14     No lab results found.    Invalid input(s): BEBE

## 2021-08-09 NOTE — PROGRESS NOTES
"[facility] Nurse Inpatient Wound Assessment with Negative Pressure Wound Therapy     Assessment   Initial Assessment of wound(s) on pt's: L lateral malleolus  L lateral ankle wound due to chronic ulcer from PAD with glap closure and L fibular bone excision on 8/7.    Status:Initial Assessment    Treatment Plan  L lateral malleolus wound: Negative Pressure Wound Therapy (NPWT) to be changed by WOC RN every Mon/Wed/Fri with medium black foam, with Aylin over tendon and adapticcovering wound bed. Staff RN to assess pump settings set to -125 and dressing integrity every shift. Remove foam dressing and replace with BID normal saline moist gauze dressing if:  -a dressing failure which cannot be repaired within 2 hours  -patient is discharging to home without a home pump  -patient is discharging to a facility outside the local area  -if a dressing is a \"Silver Foam\", remove before Radiation Therapy or MRI    Nursing to notify the Provider(s) and re-consult the [facility] Nurse if wound(s) deteriorate(s) or if necessary to reevaluate the plan.      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 MRI was concerning for osteomyelitis of the distal fibula and pt had evidence of aterial insufficiency on MIYA. Transferred for vascular surgery consultation prior to surgical I&D.              Assessment & Plan         Left ankle wound with associated cellulitis  Osteomyelitis of distal left fibula: Pt was doing some remodeling work last week when he scraped his left anterior lower extremity and left ankle on the aluminum ladder.  He started to develop increased erythema and opening of the left ankle wound 2-3 days prior to admission.  Xray on admission with bimalleolar " soft tissue swelling with no convincing evidence of osteomyelitis.  MIYA were obtained which showed BLE arterial insufficiency.  MRI obtained which shows distal fibula osteitis and concern for early osteomyelitis. Had been on vancomycin and Zosyn since admission.  Now on just zosyn.     8/7:  S/P I&D L ankle w/ bone excision and biopsy 8/7/21, no major complications reported.  Continue abx, appreciate ID and surgical service assistance.  Limited activity planned through the weekend.     8/8:  Doing well, pain controlled.  Continues on zosyn IV.  ID following and had planned to reassess after the weekend.  Await cultures.  Podiatry does not feel that all infection necessarily removed.  He may need a longer course of abx.  Patient remains on limited activity as he is planned for wound vac placement tomorrow.  Will defer local surgical site care and activity restrictions to Podiatry.     Peripheral arterial disease s/p distal SFA and AT angioplasty with 3mm balloon complicated by distal embolization requiring thrombectomy and 2 mm distal AT/DP angioplasty (8/6/21): MIYA revealed moderate arterial insufficiency in the RLE and moderate to severe in the LLE.  - Vascular surgery following s/p procedure above.   - Plavix 300 mg X1 post-op with plan for 75 mg/day X 3 months.  Aspirin + Statin should be indefinitely used.  - Atorvastatin 40 mg po at bedtime     Objective Data  Current support surface: Standard , Atmos Air mattress  Current off-loading measures: Pillows  Containment of urine/stool: Continent  Current diet/nutrition: Orders Placed This Encounter      Consistent Carb 60 grams CHO per Meal Diet    Output: I/O last 3 completed shifts:  In: 4528 [P.O.:480; I.V.:4048]  Out: -   Vin:   Sensory Perception: 4-->no impairment  Moisture: 4-->rarely moist  Activity: 3-->walks occasionally  Mobility: 3-->slightly limited  Nutrition: 3-->adequate  Friction and Shear: 3-->no apparent problem  Vin Score: 20  Labs: Recent  Labs   Lab Test 08/08/21  1140 08/04/21  0753 08/04/21  0512   HGB 12.1*  --   --    WBC 12.2*  --   --    A1C  --   --  7.6*   CRP  --  122.0*  --        Physical Exam  Wound Location: L lateral malleolus  8/9/21      Wound History: Patient reports ulcer to L ankle fro 2.5-3 weeks prior to admission, then had a slip off the ladder last week that  Caused scrape, leading to erythema and opening of ankle wound bringing him to the hospital. Osteo for L fibula and excised 8/7 with flap covering .  Surgical date: 8/7/21  Date Negative Pressure Wound Therapy initiated: 8/9/21  Measurements: (length x width x depth in cm):2.6cm x 2.4cmx 0.1  Tunneling: N/A  Undermining: N/A  Wound Base: moist red/pink tissue with white tendon exposed  Palpation of the wound bed:  Normal, spongy tedon  Periwound Skin: maceration,sutures in place surrounding open wound  Color: pale and pink  Temperature  normal   Drainage: Amount: large  Color: serosanguinous   Odor: none  Pain:  absent     Was patient premedicated prior to dressing change? No    Medication(s) used:  verbal      Intervention:     Visual inspection of wound(s):  Wound was fully assessed.  Wound Care: was done  Orders:  written  Supplies: comfeel to periwound, Aylin, Adaptic, and Medium Black Foam to wound bed  Discussed plan of care with: patient, daughter, and RN    Gabriela Colmenares RN CWOCN.

## 2021-08-09 NOTE — PROGRESS NOTES
[facility]    Received request for benefit check for potential home IV abx. Pt has all Medicare products, which does not cover IV ABX in the home. (Pt would have coverage for short term TCU or IC).     Below is what pt would be responsible for if pt wanted to go w [facility]    Drug would go to Part-D (pt would be responsible for the co-pay per dispense)    Pt would have to self-pay for the per-kin (daily)    If not homebound, nursing would also be self-pay (per visit)    For Zosyn 3.375g q6h the cost would be $65.43 / day for medication and supplies. If Rangel is not homebound, the cost for home RN would be dependent upon what the home care agency charges - costs range from $90-$200 per visit.     Thank you     Virginia Lua RN  [facility] Liaison  [phone] (Mon thru Fri 8am - 5pm)  [phone] Office

## 2021-08-09 NOTE — PROGRESS NOTES
[facility]  Hospitalist Progress Note  Name: Rangel Yang    MRN: [id number]     Summary of Stay:  Rangel Yang is a 71 year old male with a history of T2DM with peripheral neuropathy and previous left 5th toe amputation who was initially admitted 8/3/21 to [facility] for evaluation and treatment of a left ankle wound and cellulitis. MRI was concerning for osteomyelitis of the distal fibula and pt had evidence of aterial insufficiency on MIYA. Transferred for vascular surgery consultation prior to surgical I&D.          Assessment & Plan      Left ankle wound with associated cellulitis  Osteomyelitis of distal left fibula: Pt was doing some remodeling work last week when he scraped his left anterior lower extremity and left ankle on the aluminum ladder.  He started to develop increased erythema and opening of the left ankle wound 2-3 days prior to admission.  Xray on admission with bimalleolar soft tissue swelling with no convincing evidence of osteomyelitis.  MIYA were obtained which showed BLE arterial insufficiency.  MRI obtained which shows distal fibula osteitis and concern for early osteomyelitis. Had been on vancomycin and Zosyn since admission.  Now on just zosyn.    --S/P I&D L ankle w/ bone excision and biopsy 8/7/21, no major complications reported.  Continue abx, appreciate ID and surgical service assistance.      --Continues on zosyn IV.  ID following .  Await cultures.  Likely will need longer course of IV antibiotics at discharge as well.  Podiatry does not feel that all infection necessarily removed.  Patient underwent wound VAC placement on 8/9.  Will defer local surgical site care and activity restrictions to Podiatry.  Weightbearing is to be partial weightbearing in the Aircast while using a walker.  Podiatry has signed off.  They have placed relevant discharge orders.     Peripheral arterial disease s/p distal SFA and AT angioplasty with 3mm balloon complicated by  distal embolization requiring thrombectomy and 2 mm distal AT/DP angioplasty (8/6/21): MIYA revealed moderate arterial insufficiency in the RLE and moderate to severe in the LLE.  - Vascular surgery following s/p procedure above.   - Plavix 300 mg X1 post-op with plan for 75 mg/day X 3 months.  Aspirin + Statin should be indefinitely used.  - Atorvastatin 40 mg po at bedtime      Elevated blood pressure:   Pt without significant pain. Renal function well managed. No hx of HTN.   -BP has been in the 140s to 160s range here.  Consider ACE inhibitor if remains elevated.  - PRN hydralazine for SBP >180      Hyperlipidemia:  -  Statin added given vascular disease.  Recommend to continue at discharge.    DM-2 with neuropathy, A1C 7.6: Metformin 1000 mg bid PTA  - Hold Metformin for now  - sliding scale insulin.  Titrate as needed.    Deconditioning:  -  Therapy         COVID Status:  COVID-19 PCR Results    COVID-19 PCR Results 8/3/21   SARS CoV2 PCR Negative      Comments are available for some flowsheets but are not being displayed.         COVID-19 Antibody Results, Testing for Immunity    COVID-19 Antibody Results, Testing for Immunity   No data to display.            Diet: Consistent Carb 60 grams CHO per Meal Diet    DVT Prophylaxis: PCD's  Manuel Catheter: Not present  Code Status: Full Code      Disposition Plan   Discharge likely in the next day or 2 depending on antibiotic plans.  Therapy has cleared him for home.     Entered: Lesly Carbajal 08/09/2021, 6:26 PM       Interval History   Mr. Yang feels well today.  Worked well with therapy.  Denies any significant pain.  Feels like he can manage well at home.  However daughter at bedside expressed concern regarding him discharging home.    -Data reviewed today: I reviewed all new labs and imaging reports over the last 24 hours. I personally reviewed no images or EKG's today.    Physical Exam   Temp: (!) 96.5  F (35.8  C) Temp src: Axillary BP: (!) 148/64 Pulse:  70   Resp: 16 SpO2: 97 % O2 Device: None (Room air)    There were no vitals filed for this visit.  Vital Signs with Ranges  Temp:  [96.5  F (35.8  C)-97.5  F (36.4  C)] 96.5  F (35.8  C)  Pulse:  [67-75] 70  Resp:  [16] 16  BP: (132-169)/(64-90) 148/64  SpO2:  [97 %-98 %] 97 %  I/O last 3 completed shifts:  In: 6292.25 [P.O.:540; I.V.:5752.25]  Out: -     GEN:  Alert, oriented x 3, appears comfortable, no overt distress.  CV:  Regular rate and rhythm, distant.  No loud murmur/rub.  LUNGS:  Clear to auscultation bilaterally without rales/rhonchi/wheezing/retractions.  Symmetric chest rise on inhalation noted.  ABD:  Active bowel sounds, soft, non-tender, mildly distended.  No guarding/rigidity.  EXT:  Trace edema.  Detailed surgical site exam deferred to surgical service.  SKIN:  Dry to touch.    Medications     sodium chloride 75 mL/hr at 08/09/21 1036       acetaminophen  975 mg Oral Q8H     aspirin  81 mg Oral Daily     atorvastatin  40 mg Oral QPM     clopidogrel  75 mg Oral Daily     insulin aspart  1-7 Units Subcutaneous TID AC     insulin aspart  1-5 Units Subcutaneous At Bedtime     piperacillin-tazobactam  4.5 g Intravenous Q6H     polyethylene glycol  17 g Oral Daily     senna-docusate  1 tablet Oral BID     sodium chloride (PF)  3 mL Intracatheter Q8H     Data     Recent Labs   Lab 08/08/21  1140 08/07/21  1125 08/05/21  0511   WBC 12.2* 10.2 10.3   HGB 12.1* 12.2* 12.4*   HCT 37.6* 38.4* 38.6*   MCV 93 93 93    257 203       Recent Labs   Lab 08/09/21  1726 08/09/21  1211 08/09/21  0810 08/08/21  1140 08/07/21  1125 08/05/21  0511   NA  --   --   --  136 136 136   POTASSIUM  --   --   --  4.0 3.9 4.3   CHLORIDE  --   --   --  105 104 107   CO2  --   --   --  27 31 26   ANIONGAP  --   --   --  4 1* 3   * 208* 153* 199* 173* 199*   BUN  --   --   --  15 11 18   CR  --   --   --  1.03 0.97 0.97   GFRESTIMATED  --   --   --  73 79 79   SHEILA  --   --   --  8.2* 8.3* 8.5       No results found for  this or any previous visit (from the past 24 hour(s)).

## 2021-08-10 LAB
GLUCOSE BLDC GLUCOMTR-MCNC: 125 MG/DL (ref 70–99)
GLUCOSE BLDC GLUCOMTR-MCNC: 140 MG/DL (ref 70–99)
GLUCOSE BLDC GLUCOMTR-MCNC: 152 MG/DL (ref 70–99)
GLUCOSE BLDC GLUCOMTR-MCNC: 154 MG/DL (ref 70–99)
GLUCOSE BLDC GLUCOMTR-MCNC: 154 MG/DL (ref 70–99)

## 2021-08-10 PROCEDURE — 250N000013 HC RX MED GY IP 250 OP 250 PS 637: Performed by: INTERNAL MEDICINE

## 2021-08-10 PROCEDURE — 99232 SBSQ HOSP IP/OBS MODERATE 35: CPT | Performed by: HOSPITALIST

## 2021-08-10 PROCEDURE — 250N000011 HC RX IP 250 OP 636: Performed by: INTERNAL MEDICINE

## 2021-08-10 PROCEDURE — 250N000013 HC RX MED GY IP 250 OP 250 PS 637: Performed by: HOSPITALIST

## 2021-08-10 PROCEDURE — 250N000013 HC RX MED GY IP 250 OP 250 PS 637: Performed by: PODIATRIST

## 2021-08-10 PROCEDURE — 120N000001 HC R&B MED SURG/OB

## 2021-08-10 RX ORDER — OXYCODONE HYDROCHLORIDE 5 MG/1
5 TABLET ORAL EVERY 6 HOURS PRN
Qty: 15 TABLET | Refills: 0 | Status: SHIPPED | OUTPATIENT
Start: 2021-08-10 | End: 2021-08-10

## 2021-08-10 RX ORDER — POLYETHYLENE GLYCOL 3350 17 G/17G
17 POWDER, FOR SOLUTION ORAL DAILY PRN
Qty: 10 PACKET | Refills: 0 | Status: SHIPPED | OUTPATIENT
Start: 2021-08-10 | End: 2021-09-14

## 2021-08-10 RX ORDER — CLOPIDOGREL BISULFATE 75 MG/1
75 TABLET ORAL DAILY
Qty: 30 TABLET | Refills: 0 | Status: SHIPPED | OUTPATIENT
Start: 2021-08-11 | End: 2022-09-13

## 2021-08-10 RX ORDER — ATORVASTATIN CALCIUM 40 MG/1
40 TABLET, FILM COATED ORAL EVERY EVENING
Qty: 30 TABLET | Refills: 0 | Status: SHIPPED | OUTPATIENT
Start: 2021-08-10 | End: 2023-07-10

## 2021-08-10 RX ORDER — AMOXICILLIN 250 MG
1 CAPSULE ORAL 2 TIMES DAILY
Qty: 20 TABLET | Refills: 0 | Status: SHIPPED | OUTPATIENT
Start: 2021-08-10 | End: 2021-09-14

## 2021-08-10 RX ORDER — METRONIDAZOLE 500 MG/1
500 TABLET ORAL 2 TIMES DAILY
Status: DISCONTINUED | OUTPATIENT
Start: 2021-08-10 | End: 2021-08-11 | Stop reason: HOSPADM

## 2021-08-10 RX ORDER — CEFTRIAXONE 2 G/1
2 INJECTION, POWDER, FOR SOLUTION INTRAMUSCULAR; INTRAVENOUS EVERY 24 HOURS
Status: DISCONTINUED | OUTPATIENT
Start: 2021-08-10 | End: 2021-08-11 | Stop reason: HOSPADM

## 2021-08-10 RX ORDER — OXYCODONE HYDROCHLORIDE 5 MG/1
5 TABLET ORAL EVERY 6 HOURS PRN
Qty: 15 TABLET | Refills: 0 | Status: SHIPPED | OUTPATIENT
Start: 2021-08-10 | End: 2021-08-11

## 2021-08-10 RX ORDER — CARBOXYMETHYLCELLULOSE SODIUM 5 MG/ML
1 SOLUTION/ DROPS OPHTHALMIC
Status: DISCONTINUED | OUTPATIENT
Start: 2021-08-10 | End: 2021-08-11 | Stop reason: HOSPADM

## 2021-08-10 RX ORDER — ACETAMINOPHEN 325 MG/1
650 TABLET ORAL EVERY 6 HOURS PRN
COMMUNITY
Start: 2021-08-10 | End: 2023-07-10

## 2021-08-10 RX ORDER — METRONIDAZOLE 500 MG/1
500 TABLET ORAL 2 TIMES DAILY
Qty: 60 TABLET | Refills: 0 | Status: SHIPPED | OUTPATIENT
Start: 2021-08-10 | End: 2021-09-09

## 2021-08-10 RX ORDER — CEFTRIAXONE 1 G/1
2000 INJECTION, POWDER, FOR SOLUTION INTRAMUSCULAR; INTRAVENOUS DAILY
Qty: 600 ML | Refills: 0 | Status: SHIPPED | OUTPATIENT
Start: 2021-08-10 | End: 2021-08-30

## 2021-08-10 RX ADMIN — ASPIRIN 81 MG: 81 TABLET, COATED ORAL at 08:19

## 2021-08-10 RX ADMIN — CLOPIDOGREL BISULFATE 75 MG: 75 TABLET ORAL at 08:19

## 2021-08-10 RX ADMIN — METRONIDAZOLE 500 MG: 500 TABLET ORAL at 08:54

## 2021-08-10 RX ADMIN — ATORVASTATIN CALCIUM 40 MG: 40 TABLET, FILM COATED ORAL at 19:42

## 2021-08-10 RX ADMIN — INSULIN ASPART 1 UNITS: 100 INJECTION, SOLUTION INTRAVENOUS; SUBCUTANEOUS at 07:42

## 2021-08-10 RX ADMIN — METRONIDAZOLE 500 MG: 500 TABLET ORAL at 21:50

## 2021-08-10 RX ADMIN — CEFTRIAXONE SODIUM 2 G: 2 INJECTION, POWDER, FOR SOLUTION INTRAMUSCULAR; INTRAVENOUS at 08:54

## 2021-08-10 RX ADMIN — LISINOPRIL 5 MG: 5 TABLET ORAL at 08:19

## 2021-08-10 RX ADMIN — INSULIN ASPART 1 UNITS: 100 INJECTION, SOLUTION INTRAVENOUS; SUBCUTANEOUS at 12:49

## 2021-08-10 RX ADMIN — ACETAMINOPHEN 975 MG: 325 TABLET, FILM COATED ORAL at 02:47

## 2021-08-10 RX ADMIN — INSULIN ASPART 1 UNITS: 100 INJECTION, SOLUTION INTRAVENOUS; SUBCUTANEOUS at 17:39

## 2021-08-10 RX ADMIN — Medication 1 DROP: at 08:53

## 2021-08-10 RX ADMIN — PIPERACILLIN SODIUM AND TAZOBACTAM SODIUM 4.5 G: 4; .5 INJECTION, POWDER, LYOPHILIZED, FOR SOLUTION INTRAVENOUS at 02:47

## 2021-08-10 ASSESSMENT — ACTIVITIES OF DAILY LIVING (ADL)
ADLS_ACUITY_SCORE: 19
ADLS_ACUITY_SCORE: 19
ADLS_ACUITY_SCORE: 18
ADLS_ACUITY_SCORE: 19
ADLS_ACUITY_SCORE: 18
ADLS_ACUITY_SCORE: 18

## 2021-08-10 NOTE — PROGRESS NOTES
[facility]    Infectious Disease Progress Note    Date of Service (when I saw the patient): 08/10/2021     Assessment & Plan   Rangel Yang is a 71 year old male who was admitted on 8/5/2021.     IMPRESSION:     1.  A 71-year-old male with underlying diabetes and probably peripheral vascular disease, prior recurrent left foot problems, now with recent traumatic lateral foot wound and cellulitis without major clinical sepsis.  MRI scan shows the wound tracks deep to bone, but unlikely by history to be osteomyelitis.  2.  Prior significant left lateral foot wound distal to the current area treated in [place] with extensive antibiotics and I and D.  Imaging without obvious infection at that site.  3.  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  4.  Diabetes mellitus.  5.  s/p S/p left lower extremity angiogram via right groin approach with distal SFA angioplasty with 6 mm DCB and AT angioplasty with 3mm balloon complicated by distal embolization requiring penumbra CAT 3 thrombectomy and 2 mm distal AT/DP angioplasty     RECOMMENDATIONS:     1.  sens citrobacter and clostridium in cxs, parh pending  2 to po flagyl 500 bid for 30 days and ceftriaxone, plan 3 weeks then if wd and labs OK, prob to extended po septra or quinolone and flagyl and back to [place] with Follow-up ID and podiatry     Amos Arora MD    Interval History   Op noted  Afebrile   No new complaints no pain  Pain OK  cx citro and clostridium    Physical Exam   Temp: 97.2  F (36.2  C) Temp src: Oral BP: (!) 149/65 Pulse: 63   Resp: 14 SpO2: 98 % O2 Device: None (Room air) Oxygen Delivery: 3 LPM  There were no vitals filed for this visit.  Vital Signs with Ranges  Temp:  [96.1  F (35.6  C)-98.9  F (37.2  C)] 97.2  F (36.2  C)  Pulse:  [63-92] 63  Resp:  [14-16] 14  BP: (148-168)/(59-78) 149/65  SpO2:  [93 %-98 %] 98 %    Constitutional: Awake, alert, cooperative, no apparent distress  Lungs: Clear to auscultation  bilaterally, no crackles or wheezing  Cardiovascular: Regular rate and rhythm, normal S1 and S2, and no murmur noted  Abdomen: Normal bowel sounds, soft, non-distended, non-tender  Skin: No rashes, no cyanosis, no edema  Other:    Medications       aspirin  81 mg Oral Daily     atorvastatin  40 mg Oral QPM     cefTRIAXone  2 g Intravenous Q24H     clopidogrel  75 mg Oral Daily     insulin aspart  1-7 Units Subcutaneous TID AC     insulin aspart  1-5 Units Subcutaneous At Bedtime     lisinopril  5 mg Oral Daily     metroNIDAZOLE  500 mg Oral BID     polyethylene glycol  17 g Oral Daily     senna-docusate  1 tablet Oral BID     sodium chloride (PF)  3 mL Intracatheter Q8H       Data   All microbiology laboratory data reviewed.  Recent Labs   Lab Test 08/08/21  1140 08/07/21  1125 08/05/21  0511   WBC 12.2* 10.2 10.3   HGB 12.1* 12.2* 12.4*   HCT 37.6* 38.4* 38.6*   MCV 93 93 93    257 203     Recent Labs   Lab Test 08/08/21  1140 08/07/21  1125 08/05/21  0511   CR 1.03 0.97 0.97     Recent Labs   Lab Test 08/04/21  0753   SED 14     No lab results found.    Invalid input(s): BEBE

## 2021-08-10 NOTE — PROGRESS NOTES
Note order for Midline insertion from early a.m.  Attempted x 2 to insert line.  Patient had been dealing with the discharge process and financial concerns coming along with that.  Spoke with RN again at 1600, patient would like to have midline inserted tomorrow before discharge.

## 2021-08-10 NOTE — PLAN OF CARE
A&Ox4. VSS on RA. A1 GB/W, minimal WB to LLE. Regular diet. Ortho boot and wound vac to LLE. Baseline N&T to BLE. PIV SL with intermit abx. Asking about abx regimen for discharge. PT, ID, vascular, WOC following. Discharge pending.

## 2021-08-10 NOTE — PROGRESS NOTES
[facility]  Hospitalist Progress Note  Name: Rangel Yang    MRN: [id number]     Summary of Stay:  Rangel Yang is a 71 year old male with a history of T2DM with peripheral neuropathy and previous left 5th toe amputation who was initially admitted 8/3/21 to [facility] for evaluation and treatment of a left ankle wound and cellulitis. MRI was concerning for osteomyelitis of the distal fibula and pt had evidence of aterial insufficiency on MIYA. Transferred for vascular surgery consultation prior to surgical I&D.          Assessment & Plan      Left ankle wound with associated cellulitis  Osteomyelitis of distal left fibula: Pt was doing some remodeling work last week when he scraped his left anterior lower extremity and left ankle on the aluminum ladder.  He started to develop increased erythema and opening of the left ankle wound 2-3 days prior to admission.  Xray on admission with bimalleolar soft tissue swelling with no convincing evidence of osteomyelitis.  MIYA were obtained which showed BLE arterial insufficiency.  MRI obtained which shows distal fibula osteitis and concern for early osteomyelitis. Had been on vancomycin and Zosyn since admission.  Now on just zosyn.    --S/P I&D L ankle w/ bone excision and biopsy 8/7/21, no major complications reported.  Continue abx, appreciate ID and surgical service assistance.      --Continued on zosyn IV during hospitalization.  ID following .  Operative cultures growing sensitive Citrobacter and Clostridium, path still pending.  Podiatry does not feel that all infection necessarily removed.  Patient underwent wound VAC placement on 8/9.  Will defer local surgical site care and activity restrictions to Podiatry.  Weightbearing is to be partial weightbearing in the Aircast while using a walker.  Podiatry has signed off.  They have placed relevant discharge orders.    --Antibiotics switched to daily ceftriaxone IV for 3 weeks along with p.o.  Flagyl 500 mg twice daily for 30 days.  Subsequently probably extended p.o. Septra or quinolone and Flagyl and back to [place] with follow-up ID and podiatry.     Peripheral arterial disease s/p distal SFA and AT angioplasty with 3mm balloon complicated by distal embolization requiring thrombectomy and 2 mm distal AT/DP angioplasty (8/6/21): MIYA revealed moderate arterial insufficiency in the RLE and moderate to severe in the LLE.  - Vascular surgery following s/p procedure above.   - Plavix 300 mg X1 post-op with plan for 75 mg/day X 3 months.  Aspirin + Statin should be indefinitely used.  - Atorvastatin 40 mg po at bedtime      Elevated blood pressure:   Pt without significant pain. Renal function well managed. No hx of HTN.   -BP has been in the 140s to 160s range here.  Consider ACE inhibitor if remains elevated.  - PRN hydralazine for SBP >180      Hyperlipidemia:  -  Statin added given vascular disease.  Recommend to continue at discharge.    DM-2 with neuropathy, A1C 7.6: Metformin 1000 mg bid PTA  - Hold Metformin for now.  Resume at discharge.  - sliding scale insulin.  Titrate as needed.    Deconditioning:  -  Therapy         COVID Status:  COVID-19 PCR Results    COVID-19 PCR Results 8/3/21   SARS CoV2 PCR Negative      Comments are available for some flowsheets but are not being displayed.         COVID-19 Antibody Results, Testing for Immunity    COVID-19 Antibody Results, Testing for Immunity   No data to display.            Diet: Consistent Carb 60 grams CHO per Meal Diet  Diet    DVT Prophylaxis: PCD's  Manuel Catheter: Not present  Code Status: Full Code      Disposition Plan   Discharge likely home tomorrow.  Currently antibiotics are being arranged for outpatient.  Likely will have home antibiotics set up.  Will need midline placed tomorrow prior to discharge.       Entered: Lesly Carbajal 08/10/2021, 4:40 PM       Interval History   Mr. Yang feels well today.  Worked well with therapy.   Denies any significant pain.  Feeling frustrated surrounding discharge planning related to antibiotics and insurance coverage.  Currently working with care coordinator setting up outpatient antibiotics.    -Data reviewed today: I reviewed all new labs and imaging reports over the last 24 hours. I personally reviewed no images or EKG's today.    Physical Exam   Temp: 97.2  F (36.2  C) Temp src: Oral BP: (!) 149/65 Pulse: 63   Resp: 14 SpO2: 98 % O2 Device: None (Room air) Oxygen Delivery: 3 LPM  There were no vitals filed for this visit.  Vital Signs with Ranges  Temp:  [96.1  F (35.6  C)-98.9  F (37.2  C)] 97.2  F (36.2  C)  Pulse:  [63-92] 63  Resp:  [14-16] 14  BP: (149-168)/(59-78) 149/65  SpO2:  [93 %-98 %] 98 %  I/O last 3 completed shifts:  In: 350 [P.O.:350]  Out: -     GEN:  Alert, oriented x 3, appears comfortable, no overt distress.  CV:  Regular rate and rhythm, distant.  No loud murmur/rub.  LUNGS:  Clear to auscultation bilaterally without rales/rhonchi/wheezing/retractions.  Symmetric chest rise on inhalation noted.  ABD:  Active bowel sounds, soft, non-tender, mildly distended.  No guarding/rigidity.  EXT:  Trace edema.  Detailed surgical site exam deferred to surgical service.  SKIN:  Dry to touch.    Medications       aspirin  81 mg Oral Daily     atorvastatin  40 mg Oral QPM     cefTRIAXone  2 g Intravenous Q24H     clopidogrel  75 mg Oral Daily     insulin aspart  1-7 Units Subcutaneous TID AC     insulin aspart  1-5 Units Subcutaneous At Bedtime     lisinopril  5 mg Oral Daily     metroNIDAZOLE  500 mg Oral BID     polyethylene glycol  17 g Oral Daily     senna-docusate  1 tablet Oral BID     sodium chloride (PF)  10 mL Intracatheter Q8H     sodium chloride (PF)  3 mL Intracatheter Q8H     Data     Recent Labs   Lab 08/08/21  1140 08/07/21  1125 08/05/21  0511   WBC 12.2* 10.2 10.3   HGB 12.1* 12.2* 12.4*   HCT 37.6* 38.4* 38.6*   MCV 93 93 93    257 203       Recent Labs   Lab  08/10/21  1213 08/10/21  0731 08/10/21  0218 08/08/21  1140 08/07/21  1125 08/05/21  0511   NA  --   --   --  136 136 136   POTASSIUM  --   --   --  4.0 3.9 4.3   CHLORIDE  --   --   --  105 104 107   CO2  --   --   --  27 31 26   ANIONGAP  --   --   --  4 1* 3   * 140* 125* 199* 173* 199*   BUN  --   --   --  15 11 18   CR  --   --   --  1.03 0.97 0.97   GFRESTIMATED  --   --   --  73 79 79   SHEILA  --   --   --  8.2* 8.3* 8.5       No results found for this or any previous visit (from the past 24 hour(s)).

## 2021-08-10 NOTE — PROVIDER NOTIFICATION
MD Notification    Notified Person: MD    Notified Person Name: Augusto    Notification Date/Time:8/9/2021    Notification Interaction: text page    Purpose of Notification:  Pt tolerating reg diet w/ good oral intake. Can we discontinue continuous IV fluids? Thanks!    Orders Received:    Comments:

## 2021-08-10 NOTE — PLAN OF CARE
A&Ox4. VSS on RA ex HTN (BP 150s). A1 GB WK, minimal WB to LLE. Tolerating reg diet. Voiding in urinal. Ortho boot, wound vac to LLE. Baseline N&T in BLE.  PIV SL w/ int abx. PT, ID, Vasc Surg, WOC following.

## 2021-08-10 NOTE — PROGRESS NOTES
[facility]    Received request for benefit check for IV abx. Pt has all Medicare products, which does not cover IV ABX in the home. (Pt would have coverage for short term TCU or IC).      Below is what pt would be responsible for if pt wanted to go w [facility] home infusion    Drug would go to Part-D (pt would be responsible for the co-pay per dispense)    Pt would have to self-pay for the per-kin (daily)    If not homebound, nursing would also be self-pay (per visit)     For cerftriaxone 2g q24 the cost would be $33.19 / day for medication and supplies.    Addendum @ 1030h: I met with Rangel to review self-pay quote for home IV abx. He was quite upset that [facility]'s benefit review indicated he would have any out of pocket cost. He reports that his Medicare and Medicare Supplement Plan F [employer] Supplement would pay for everything. I discussed the option for infusion center as his final abx is a daily medication. He again became frustrated because he's supposed to limit weight bearing on his LLE but is also being told he may need to go to the infusion center daily. He agreed to allow me to send referral to [facility] and [facility] for comparative pricing. Rangel stated that his preference is home infusion unless he has to pay out of pocket in which case he would like to use infusion center. I will follow-up with Rangel with comparative pricing quotes.     Addendum @ 8947h:     [facility]: Cost for ceftriaxone 2g q24 is $33.19 / day for medication and supplies.    [facility]: Cost for ceftriaxone 2g q24 is $22.50 / day for medication and supplies.    [facility]: Awaiting comparative pricing quote.     I met with Rangel at bedside and he would like to proceed with [facility] at this time. [facility] will communicate with [facility] regarding patient choice.     Thank you    Virginia Lua RN  [facility] Liaison  [phone] (Mon thru Fri 8am - 5pm)  [phone] Office

## 2021-08-10 NOTE — PLAN OF CARE
Alert, oriented x 4, continent x 2, reported 1 BM this morning, ambulates with A1 GB and W, denies pain, boots on LLE when ambulating, CC working on Antibiotic discharge arrangement.

## 2021-08-11 VITALS
RESPIRATION RATE: 16 BRPM | OXYGEN SATURATION: 95 % | TEMPERATURE: 96.2 F | DIASTOLIC BLOOD PRESSURE: 68 MMHG | SYSTOLIC BLOOD PRESSURE: 146 MMHG | WEIGHT: 224.65 LBS | BODY MASS INDEX: 32.23 KG/M2 | HEART RATE: 66 BPM

## 2021-08-11 LAB
GLUCOSE BLDC GLUCOMTR-MCNC: 115 MG/DL (ref 70–99)
GLUCOSE BLDC GLUCOMTR-MCNC: 153 MG/DL (ref 70–99)
GLUCOSE BLDC GLUCOMTR-MCNC: 156 MG/DL (ref 70–99)

## 2021-08-11 PROCEDURE — 272N000433 HC KIT CATH IV 18 OR 20G CM, POWERGLIDE W MAX BARRIER

## 2021-08-11 PROCEDURE — 250N000013 HC RX MED GY IP 250 OP 250 PS 637: Performed by: PODIATRIST

## 2021-08-11 PROCEDURE — 250N000013 HC RX MED GY IP 250 OP 250 PS 637: Performed by: INTERNAL MEDICINE

## 2021-08-11 PROCEDURE — 36569 INSJ PICC 5 YR+ W/O IMAGING: CPT

## 2021-08-11 PROCEDURE — 99239 HOSP IP/OBS DSCHRG MGMT >30: CPT | Performed by: HOSPITALIST

## 2021-08-11 PROCEDURE — G0463 HOSPITAL OUTPT CLINIC VISIT: HCPCS | Mod: 25

## 2021-08-11 PROCEDURE — 97605 NEG PRS WND THER DME<=50SQCM: CPT

## 2021-08-11 PROCEDURE — 250N000009 HC RX 250: Performed by: INTERNAL MEDICINE

## 2021-08-11 PROCEDURE — 250N000011 HC RX IP 250 OP 636: Performed by: INTERNAL MEDICINE

## 2021-08-11 PROCEDURE — 250N000013 HC RX MED GY IP 250 OP 250 PS 637: Performed by: HOSPITALIST

## 2021-08-11 RX ORDER — LISINOPRIL 5 MG/1
5 TABLET ORAL DAILY
Qty: 30 TABLET | Refills: 0 | Status: SHIPPED | OUTPATIENT
Start: 2021-08-11 | End: 2023-07-10

## 2021-08-11 RX ADMIN — ASPIRIN 81 MG: 81 TABLET, COATED ORAL at 07:56

## 2021-08-11 RX ADMIN — LIDOCAINE HYDROCHLORIDE 1 ML: 10 INJECTION, SOLUTION EPIDURAL; INFILTRATION; INTRACAUDAL; PERINEURAL at 10:37

## 2021-08-11 RX ADMIN — LISINOPRIL 5 MG: 5 TABLET ORAL at 07:57

## 2021-08-11 RX ADMIN — CEFTRIAXONE SODIUM 2 G: 2 INJECTION, POWDER, FOR SOLUTION INTRAMUSCULAR; INTRAVENOUS at 10:49

## 2021-08-11 RX ADMIN — CLOPIDOGREL BISULFATE 75 MG: 75 TABLET ORAL at 07:56

## 2021-08-11 RX ADMIN — INSULIN ASPART 1 UNITS: 100 INJECTION, SOLUTION INTRAVENOUS; SUBCUTANEOUS at 13:14

## 2021-08-11 RX ADMIN — INSULIN ASPART 1 UNITS: 100 INJECTION, SOLUTION INTRAVENOUS; SUBCUTANEOUS at 07:54

## 2021-08-11 RX ADMIN — METRONIDAZOLE 500 MG: 500 TABLET ORAL at 07:57

## 2021-08-11 ASSESSMENT — ACTIVITIES OF DAILY LIVING (ADL)
ADLS_ACUITY_SCORE: 18

## 2021-08-11 NOTE — PROCEDURES
[facility]    Single Lumen Midline Placement    Date/Time: 8/11/2021 11:24 AM  Performed by: Carrie Torres RN  Authorized by: Amos Arora MD   Indications: vascular access    UNIVERSAL PROTOCOL   Site Marked: Yes  Prior Images Obtained and Reviewed:  Yes  Required items: Required blood products, implants, devices and special equipment available    Patient identity confirmed:  Verbally with patient, hospital-assigned identification number and arm band  NA - No sedation, light sedation, or local anesthesia  Confirmation Checklist:  Patient's identity using two indicators, relevant allergies, correct equipment/implants were available and procedure was appropriate and matched the consent or emergent situation  Time out: Immediately prior to the procedure a time out was called    Universal Protocol: the Joint Commission Universal Protocol was followed    Preparation: Patient was prepped and draped in usual sterile fashion    ESBL (mL):  2         ANESTHESIA    Local Anesthetic: Lidocaine 1% without epinephrine  Anesthetic Total (mL):  1      SEDATION    Patient Sedated: No        Preparation: skin prepped with ChloraPrep  Skin prep agent: skin prep agent completely dried prior to procedure  Sterile barriers: maximum sterile barriers were used: cap, mask, sterile gown, sterile gloves, and large sterile sheet  Hand hygiene: hand hygiene performed prior to central venous catheter insertion  Type of line used: Midline  Catheter type: single lumen  Lumen type: non-valved  Catheter size: 4 Fr  Brand: Bard  Placement method: ultrasound  Number of attempts: 1  Successful placement: yes  Orientation: left  Location: cephalic vein  Arm circumference: adults 10 cm  Extremity circumference: 30  Visible catheter length: 0  Internal length: 8 cm  Dressing and securement: chlorhexidine disc applied, dressing applied and securement device  Post procedure assessment: blood return through all  ports  PROCEDURE   Patient Tolerance:  Patient tolerated the procedure well with no immediate complications     Midline placed without difficulty/midline ok to use

## 2021-08-11 NOTE — DISCHARGE SUMMARY
Discharge Summary  Hospitalist    Date of Admission:  8/5/2021  Date of Discharge:  8/11/2021  Discharging Provider: Lesly Carbajal MD  Date of Service (when I saw the patient): 08/11/21    Discharge Diagnoses   Left ankle wound with associated cellulitis  Osteomyelitis of distal left fibula  Peripheral arterial disease s/p distal SFA and AT angioplasty with 3mm balloon complicated by distal embolization requiring thrombectomy and 2 mm distal AT/DP angioplasty (8/6/21)      History of Present Illness   Please refer H & P for details.      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 MRI was concerning for osteomyelitis of the distal fibula and pt had evidence of aterial insufficiency on MIYA. Transferred for vascular surgery consultation prior to surgical I&D.              Assessment & Plan         Left ankle wound with associated cellulitis  Osteomyelitis of distal left fibula: Pt was doing some remodeling work last week when he scraped his left anterior lower extremity and left ankle on the aluminum ladder.  He started to develop increased erythema and opening of the left ankle wound 2-3 days prior to admission.  Xray on admission with bimalleolar soft tissue swelling with no convincing evidence of osteomyelitis.  MIYA were obtained which showed BLE arterial insufficiency.  MRI obtained which shows distal fibula osteitis and concern for early osteomyelitis. Had been on vancomycin and Zosyn since admission.  Now on just zosyn.     --S/P I&D L ankle w/ bone excision and biopsy 8/7/21, no major complications reported.  Continue abx, appreciate ID and surgical service assistance.       --Continued on zosyn IV during hospitalization.  ID following .  Operative cultures growing sensitive  Citrobacter and Clostridium, path still pending.  Podiatry does not feel that all infection necessarily removed.  Patient underwent wound VAC placement on 8/9.  Will defer local surgical site care and activity restrictions to Podiatry.  Weightbearing is to be partial weightbearing in the Aircast while using a walker.  Podiatry has signed off.  They have placed relevant discharge orders.     --Antibiotics switched to daily ceftriaxone IV for 3 weeks along with p.o. Flagyl 500 mg twice daily for 30 days.  Subsequently probably extended p.o. Septra or quinolone and Flagyl and back to [place] with follow-up ID and podiatry.  Midline placed prior to discharge.    --Appreciate care coordinator assistance with setting up home infusion for antibiotics and continued wound VAC cares as outpatient with home cares.  Home RN, PT, OT ordered.  He is also issued a walker.     Peripheral arterial disease s/p distal SFA and AT angioplasty with 3mm balloon complicated by distal embolization requiring thrombectomy and 2 mm distal AT/DP angioplasty (8/6/21): MIYA revealed moderate arterial insufficiency in the RLE and moderate to severe in the LLE.  - Vascular surgery following s/p procedure above.   - Plavix 300 mg X1 post-op with plan for 75 mg/day X 3 months.  Aspirin + Statin should be indefinitely used.  - Atorvastatin 40 mg po at bedtime      Elevated blood pressure: Hypertension, new diagnosis:   Pt without significant pain. Renal function well managed. No hx of HTN.   -BP has been in the 140s to 160s range here.    Started on 5 mg lisinopril.  Will need recheck of renal function in few weeks.  - PRN hydralazine for SBP >180      Hyperlipidemia:  -  Statin added given vascular disease.  Recommend to continue at discharge.     DM-2 with neuropathy, A1C 7.6: Metformin 1000 mg bid PTA  - Hold Metformin for now.  Resume at discharge.  - sliding scale insulin.  Titrate as needed.     Deconditioning:  -  Therapy    Patient is stable  at discharge home.    Lesly Carbajal MD, MD      Pending Results   These results will be followed up by Hospitalist team.  Unresulted Labs Ordered in the Past 30 Days of this Admission     Date and Time Order Name Status Description    8/7/2021  9:13 AM Anaerobic Bacterial Culture Routine Preliminary           Code Status   Full Code       Primary Care Physician   Sandra Colmenares    Follow-ups Needed After Discharge   Follow-up Appointments     Follow Up      Follow up with Dr. Whitley or team members in 2 weeks.    *PLEASE CALL [phone] TO SCHEDULE*  Location openings are subject to change due to COVID-19  --------------------------------------------------------------------------  DR. RADHA AUSTIN  [place]  [address]  [place] [address] [phone]    [facility]  [address] [place] [address] [address]  [place], [place] [id number] [phone]  --------------------------------------------------------------------------  DR. BOBBY BRADSHAW  [place]  [address] [place], [place]     [phone]        [place]  [address]  [place] [address] [phone]  ---------------------------------------------------------------------------  --------------------------------------------------------------------------  DR. CHRISTINA WHITLEY  [place]  [address]  [place] [address] [phone]         Follow-up and recommended labs and tests       Follow up with primary care provider, Sandra Colmenares, within 7 days   for hospital follow- up.  No follow up labs or test are needed.  Follow up with ID in 2 weeks and Podiatry as recommended by them .             Physical Exam   Temp: (!) 96.2  F (35.7  C) Temp src: Oral BP: (!) 146/68 Pulse: 66   Resp: 16 SpO2: 95 % O2 Device: None (Room air)    Vitals:    08/11/21 0527   Weight: 101.9 kg (224 lb 10.4 oz)     Vital Signs with Ranges  Temp:  [96.2  F (35.7  C)-97.8  F (36.6  C)] 96.2  F (35.7  C)  Pulse:  [66-78] 66  Resp:  [16-18]  "16  BP: (134-169)/(62-76) 146/68  SpO2:  [95 %-99 %] 95 %  I/O last 3 completed shifts:  In: 800 [P.O.:800]  Out: -     GEN:  Alert, oriented x 3, appears comfortable, no overt distress.  CV:  Regular rate and rhythm, distant.  No loud murmur/rub.  LUNGS:  Clear to auscultation bilaterally without rales/rhonchi/wheezing/retractions.  Symmetric chest rise on inhalation noted.  ABD:  Active bowel sounds, soft, non-tender, mildly distended.  No guarding/rigidity.  EXT:  Trace edema.  Detailed surgical site exam deferred to surgical service.  Wound VAC in place.  SKIN:  Dry to touch.         Discharge Disposition   Discharged to home  Condition at discharge: Stable    Consultations This Hospital Stay   PHARMACY TO DOSE VANCO  PODIATRY IP CONSULT  INFECTIOUS DISEASES IP CONSULT  VASCULAR SURGERY IP CONSULT  CARE MANAGEMENT / SOCIAL WORK IP CONSULT  WOUND OSTOMY CONTINENCE NURSE  IP CONSULT  CARE MANAGEMENT / SOCIAL WORK IP CONSULT  ORTHOSIS EXTREMITY LOWER REFERRAL IP CONSULT  PHYSICAL THERAPY ADULT IP CONSULT  VASCULAR ACCESS ADULT IP CONSULT  INFECTIOUS DISEASES IP CONSULT    Time Spent on this Encounter   ILesly MD, personally saw the patient today and spent greater than 30 minutes discharging this patient.    Discharge Orders      Home care nursing referral      Home infusion referral      Home Care PT Referral for Hospital Discharge      Home Care OT Referral for Hospital Discharge      Activity    Partial weight bearing with AirCast on and using a walker.   I told him that \"partial\" is the least amount of weight he can do safely.  Okay to bear weight in the AirCast for stairs.  Okay to remove the AirCast during periods of rest for gentle ankle range of motion exercises.     Follow Up    Follow up with Dr. Whitley or team members in 2 weeks.    *PLEASE CALL [phone] TO SCHEDULE*  Location openings are subject to change due to " COVID-19  --------------------------------------------------------------------------  DR. RADHA AUSTIN  [place]  [address]  [place] [address] [phone]    [place]  [address]  [place] [address]  [phone]  --------------------------------------------------------------------------  DR. BOBBY BRADSHAW  [facility]  [address] [place] [address]     [phone]        [place]  [address]  [place] [address] [phone]  -----------------------------------------------------------------------------------------------------------------------------------------------------  DR. CHRISTINA ROGEL  [place]  [address]  [place] [address] [phone]     Dressing    Wound VAC per the recommendations of the WOC RN     Reason for your hospital stay    You were hospitalized with foot infection, treated with antibiotics     Follow-up and recommended labs and tests     Follow up with primary care provider, Sandra Colmenares, within 7 days for hospital follow- up.  No follow up labs or test are needed.  Follow up with ID in 2 weeks and Podiatry as recommended by them .     When to contact your care team    Call your primary doctor if you have any of the following: worsening pain, fever, short of breath, bleeding issues, swelling, diarrhea     MD face to face encounter    Documentation of Face to Face and Certification for Home Health Services    I certify that patient: Rangel Yang is under my care and that I, or a nurse practitioner or physician's assistant working with me, had a face-to-face encounter that meets the physician face-to-face encounter requirements with this patient on: 8/11/2021.    This encounter with the patient was in whole, or in part, for the following medical condition, which is the primary reason for home health care: diabetic foot ulcer.    I certify that, based on my findings, the following services are medically  necessary home health services: Nursing, Occupational Therapy, and Physical Therapy.    My clinical findings support the need for the above services because: Nurse is needed: To provide caregiver training to assist with: iv antibiotic, wound care.., Occupational Therapy Services are needed to assess and treat cognitive ability and address ADL safety due to impairment in weakness., and Physical Therapy Services are needed to assess and treat the following functional impairments: weakness    Further, I certify that my clinical findings support that this patient is homebound (i.e. absences from home require considerable and taxing effort and are for medical reasons or [employer]reg[employer] services or infrequently or of short duration when for other reasons) because: Requires assistance of another person or specialized equipment to access medical services because patient: Requires supervision of another for safe transfer...    Based on the above findings. I certify that this patient is confined to the home and needs intermittent skilled nursing care, physical therapy and/or speech therapy.  The patient is under my care, and I have initiated the establishment of the plan of care.  This patient will be followed by a physician who will periodically review the plan of care.  Physician/Provider to provide follow up care: Sandra Colmenares    Attending hospital physician (the Medicare certified [place] provider): Lesly Carbajal MD  Physician Signature: See electronic signature associated with these discharge orders.  Date: 8/11/2021     Willy ANTONY Documentation:   Describe the reason for need to support medical necessity: diabetic foot ulcer    I, the undersigned, certify that the above prescribed supplies are medically necessary for this patient and is both reasonable and necessary in reference to accepted standards of medical and necessary in reference to accepted standards of medical practice in the treatment of this patient's  condition and is not prescribed as a convenience.     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 Arora office.  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)      lisinopril (ZESTRIL) 5 MG tablet Take 1 tablet (5 mg) by mouth daily  Qty: 30 tablet, Refills: 0    Associated Diagnoses: Essential hypertension      metroNIDAZOLE (FLAGYL) 500 MG tablet Take 1 tablet (500 mg) by mouth 2 times daily  Qty: 60 tablet, Refills: 0    Associated Diagnoses: Osteomyelitis of left fibula, unspecified type (H)      polyethylene glycol (MIRALAX) 17 g packet Take 17 g by mouth daily as needed for constipation  Qty: 10 packet, Refills: 0    Associated Diagnoses: 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)       senna-docusate (SENOKOT-S/PERICOLACE) 8.6-50 MG tablet Take 1 tablet by mouth 2 times daily Hold for loose stools  Qty: 20 tablet, Refills: 0    Associated Diagnoses: 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)         CONTINUE these medications which have NOT CHANGED    Details   metFORMIN (GLUCOPHAGE) 500 MG tablet Take 1,000 mg by mouth 2 times daily (with meals)           Allergies   No Known Allergies  Data   Most Recent 3 CBC's:  Recent Labs   Lab Test 08/08/21  1140 08/07/21  1125 08/05/21  0511   WBC 12.2* 10.2 10.3   HGB 12.1* 12.2* 12.4*   MCV 93 93 93    257 203      Most Recent 3 BMP's:  Recent Labs   Lab Test 08/11/21  1216 08/11/21  0715 08/11/21  0205 08/08/21  1140 08/07/21  1125 08/05/21  0511   NA  --   --   --  136 136 136   POTASSIUM  --   --   --  4.0 3.9 4.3   CHLORIDE  --   --   --  105 104 107   CO2  --   --   --  27 31 26   BUN  --   --   --  15 11 18   CR  --   --   --  1.03 0.97 0.97   ANIONGAP  --   --   --  4 1* 3   SHEILA  --   --   --  8.2* 8.3* 8.5   * 156* 115* 199* 173* 199*     Most Recent 2 LFT's:No lab results found.  Most Recent INR's and Anticoagulation Dosing History:  Anticoagulation Dose History     Recent Dosing and Labs Latest Ref Rng & Units 4/20/2010    INR 0.86 - 1.14 0.98        Most Recent 3 Troponin's:No lab results found.  Most Recent Cholesterol Panel:  Recent Labs   Lab Test 08/07/21  1125   CHOL 183   *   HDL 27*   TRIG 175*     Most Recent 6 Bacteria Isolates From Any Culture (See EPIC Reports for Culture Details):No lab results found.  Most Recent TSH, T4 and A1c Labs:  Recent Labs   Lab Test 08/04/21  0512   A1C 7.6*       Results for orders placed or performed during the hospital encounter of 08/05/21   IR Lower Extremity Angiogram Left    Narrative    Preprocedure diagnosis: Non healing wound of the left lateral ankle  with distal fibular osteomyelitis.    Postprocedure diagnosis: Same.    PROCEDURE:  1. Ultrasound-guided  right common femoral artery access..  2. Selective catheterization of left common iliac, external iliac,  common femoral, deep femoral, popliteal, anterior tibial and dorsalis  pedis arteries.  3. Left lower extremity arteriogram with runoff.  4. Left distal superficial femoral/proximal popliteal artery  recanalization with balloon angioplasty using 5 mm x 10 cm angioplasty  balloon with completion arteriogram.  5. Left distal superficial femoral/popliteal artery balloon  angioplasty using 6 mm x 8 cm drug-coated balloon with completion  arteriogram.  6. Left anterior tibial artery balloon angioplasty using 3 mm x 22 cm  angioplasty balloon with completion arteriogram.  7. Left distal anterior tibial artery thrombectomy using Cat 3penumbra  device with balloon angioplasty using 2 mm x 10 cm angioplasty balloon  with completion arteriogram.  8. Right common femoral artery access closure with 6 Venezuelan Angio-Seal  device.    Surgeon: Tomas Champion M.D.  Assistant: Eliana Kinney M.D.    Sedation: Patient received 4 mg of intravenous Versed and 50 mcg of  intravenous fentanyl. Medication was administered under my direct  supervision. Medication was administered by registered nurse in IR.  The patient was continuously monitored.  Heparin: 10,000 units  Protamine: 30 mg.  Sedation time: 116 minutes  Fluoroscopy time: 25 minutes.  Total fluoroscopy dose: 102 mGy.  Contrast: 165 mL of Visipaque was used.    Indication for procedure: This is a 71-year-old male that nonhealing  of the distal left leg at the level of the lateral malleolus and  osteomyelitis of the distal fibula. Noninvasive studies show arterial  insufficiency. Arteriogram is advised for revascularization and limb  salvage.     Procedure details: Patient was identified and then taken to the  procedure room and placed in supine position. Both groins were  prepped. The right common femoral artery was located to the right  femoral head by way of fluoroscopy. It was  further localized with  ultrasonography. Local anesthetic was administered in the right groin.  Images were stored. The right common femoral artery was accessed with  a micropuncture needle in retrograde fashion. This was then converted  to a 0.035 inch wire system and a short 5 Ecu[place]orean sheath was placed.  4000 units of heparin were given. A 0.035 inch stiff Glidewire and  Omni Flush catheter were advanced into the distal aorta and aortoiliac  arteriogram was performed which showed that the distal aorta, both  common iliac arteries, external iliac arteries, both hypogastric  arteries were patent. Wire and catheter were then advanced into the  distal left external iliac artery. Arteriogram showed that the distal  left external iliac artery, common femoral artery and deep femoral  arteries were patent. Proximal superior femoral artery had moderate  stenosis over a long segment. Otherwise it was patent. Wire was  advanced into the superficial femoral artery and replaced with an  angled Navicross catheter. Arteriogram showed that the distal  femoral/proximal popliteal artery was occluded. It reconstituted as an  above-the-knee popliteal artery. The tibioperoneal trunk was occluded.  The anterior tibial artery was patent at its origin but had multiple  areas of high-grade tendon stenoses down to the distal anterior tibial  artery. The peroneal artery reconstituted by way of intramuscular  collaterals. There was hyperemia in the distal anterior tibial artery  in the area of the wound. Additional 4000 units heparin were given.  The sheath was converted to a 6 Ecuadorean crossover sheath and positioned  in the mid superficial femoral artery. The occlusion in the distal  superficial femoral artery was crossed with a straight 0.035 inch  Glidewire and Jordan cross catheter. Catheter was advanced into the  second segment of the popliteal artery. Arteriogram confirmed  intraluminal position. The occlusion was first treated with a 5 mm  "x  10 cm angioplasty balloon. Balloon was inflated to 10 amaury for 3  minutes. Balloon was deflated and removed. There was no residual  stenosis, perforation or dissection. Then the treated this with a 6 mm  x 8 cm drug coated balloon. Balloon was inflated to 10 amaury for 3  minutes. Balloon was deflated and removed and showed excellent  arteriographic result.     Then we recommend with a 0.014 inch transcend wire and Navicorss  catheter. The anterior tibial artery was selected. The wire and  catheter beyond the area of the multiple stenoses into the distal  anterior tibial artery. Intraluminal position was confirmed and the  anterior tibial artery was balloon angioplastied with a 3 mm x 22 cm  angioplasty balloon. Balloon was inflated for 3 minutes completion  arteriogram showed excellent without however it did not showed that  the distal anterior tibial artery was occluded. We suspected that this  is due to embolization. We gave additional 2000 units of heparin.  Multiple passes with Cat 3 thrombectomy device were made. The  completion arteriogram did not show resolution. The occlusion was  traversed with the 0.014\" wire and cat 3 catheter. The 0.014 inch wire  and positioned it in the dorsalis pedis artery anterior to the  occlusion was then balloon angioplastied with a 2 mm x 10 cm  angioplasty balloon. Balloon was inflated to 12 amaury for 3 minutes.  Balloon was then deflated and removed which showed significant  improvement and free flow of contrast into the dorsalis pedis artery.    It also showed hyperemia on the area of the left lateral malleolus.   We decided to treat this area again with a 14 amaury pressure and 3  minute inflation. Balloon was deflated and removed and showed in-line  flow into the forefoot. Procedure was concluded. 30 mg of protamine  were given.    Common femoral artery access site was closed with a 6 Slovak  Angio-Seal device.    Patient tolerated the procedure well. There were no " immediate  complications. Patient was taken to his room in stable condition.    I spoke to his daughter and explained our procedure.     TAD BAUGH MD         SYSTEM ID:  [id number]   X-ray lt Ankle G/E 3 vw port: In PACU    Narrative    XR ANKLE PORT LEFT G/E 3 VIEWS 8/7/2021 10:22 AM    HISTORY: post op state ankle pain.    COMPARISON: None.    FINDINGS:   No acute fracture. Small linear fragment of bone along the lateral  margin of the lateral malleolus. No plain film evidence of  osteomyelitis. There is a soft tissue ulcer overlying the lateral  malleolus. Small chronic ununited fracture of the tip of the lateral  malleolus. Small chronic avulsion fracture of the tip of the medial  malleolus.    SANDRO PERDOMO MD         SYSTEM ID:  [id number]

## 2021-08-11 NOTE — PLAN OF CARE
Summary:osteomyelitis,  I & D 8/7, wound vac placed 8/9    Primary Diagnosis: Osteomyelitis LLE  Orientation: A&Ox4  Aggression Stop Light: Green  Mobility: SBA with walker with wound vac  Pain Management: Denies   Diet: Mod CHO, tolerating   Bowel/Bladder: Continent  Abnormal Lab/Assessments: N/a  Drain/Device/Wound: Wound vac, Midline placed for to Iv antibiotics for discharge  Consults: ID/Podiatry/PT  D/C Day/Goals/Place: Discharge home today with Iv antibiotics, wound vac    Shift Note:   Patient A&Ox4, SBA ambulating  to bathroom with walker, up in chair.  Denies pain, VSS.  Wound vac changed today per WOC, wound vac in room for discharge.  Midline placed today for antibiotics, Iv antibiotic given this shift.  Awaiting for prescriptions from pharmacy and walker for discharge.

## 2021-08-11 NOTE — PROGRESS NOTES
Care Management Discharge Note    Discharge Date: 08/11/2021       Discharge Disposition: Transitional Care, Home    Discharge Services:      Discharge DME:      Discharge Transportation: family or friend will provide    Private pay costs discussed: Not applicable    PAS Confirmation Code:    Patient/family educated on Medicare website which has current facility and service quality ratings: yes    Education Provided on the Discharge Plan:    Persons Notified of Discharge Plans: Farida HENDRICKS Liaison [facility], Brenda HENDRICKS Liaison [facility], bedside RN  Patient/Family in Agreement with the Plan:      Handoff Referral Completed: No    Additional Information:  Patient ready to discharge home with [facility] for IV abx & [facility] RN for Wound VAC changes and PT/OT.  Received call from Brenda HENDRICKS from [facility] stating that she will do bedside teach with pt's daughter Peggy this afternoon and requested discharge orders be faxed to [phone].  Faxed discharge orders to [facility] [phone].   Received confirmation from [facility] that Wound VAC and approved.  Delivered home Wound VAC serial number [id number] to pt's room.  Patient informed of potential cost of rental of Wound VAC and signed Proof of Delivery/Assignment of Benefits Statement and form was faxed to [facility] at [phone].  Met with pt to discuss discharge plan of home with [facility] for IV abx and [facility] RN for Wound VAC changes and PT/OT. Patient stated understanding of this plan.  Pt's daughter Peggy received bedside teach from Brenda from [facility] for administering IV abx.  Per pt, his daughter Peggy will be transporting him home and scheduling his follow-up with pt's Podiatrist.   Sary Talbot, RN  Sary Talbot RN, BSN, OCN   Inpatient Care Coordination [address]  Office: [phone]

## 2021-08-11 NOTE — PLAN OF CARE
A&Ox4. VSS on RA. Denies pain. SBA with GB and walker. Wound vac to suction with minimal output. Dressing CDI. Orthotic boot in place. Ambulated to bathroom.

## 2021-08-11 NOTE — PROGRESS NOTES
Discharge    Patient discharged to daughter's house via family car   Care plan note. VSS on RA. Medications from pharmacy sent with pt. Education completed with daughter and pt regarding hooking up wound vac. Spoke with Dr Carbajal on the phone and was going to discontinue the oxycodone.     Listed belongings gathered and returned to patient. Yes  Belongings returned to patient from security/pharmacy No  Care Plan and Patient education resolved: Yes  Prescriptions if needed, hard copies sent with patient  NA  Home and hospital acquired medications returned to patient: NA  Medication Bin checked and emptied on discharge Yes  Follow up appointment made for patient: Yes

## 2021-08-11 NOTE — PROGRESS NOTES
[facility]    Infectious Disease Progress Note    Date of Service (when I saw the patient): 08/11/2021     Assessment & Plan   Rangel Yang is a 71 year old male who was admitted on 8/5/2021.     IMPRESSION:     1.  A 71-year-old male with underlying diabetes and probably peripheral vascular disease, prior recurrent left foot problems, now with recent traumatic lateral foot wound and cellulitis without major clinical sepsis.  MRI scan shows the wound tracks deep to bone, but unlikely by history to be osteomyelitis.  2.  Prior significant left lateral foot wound distal to the current area treated in [place] with extensive antibiotics and I and D.  Imaging without obvious infection at that site.  3.  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  4.  Diabetes mellitus.  5.  s/p S/p left lower extremity angiogram via right groin approach with distal SFA angioplasty with 6 mm DCB and AT angioplasty with 3mm balloon complicated by distal embolization requiring penumbra CAT 3 thrombectomy and 2 mm distal AT/DP angioplasty     RECOMMENDATIONS:     1.  sens citrobacter and clostridium in cxs, parh pending  2 to po flagyl 500 bid for 30 days and ceftriaxone, plan 3 weeks then if wd and labs OK, prob to extended po septra or quinolone and flagyl and back to [place] with Follow-up ID and podiatry   [place] discussion on home IV as he previously seemed to understand not covered ??    Amos Arora MD    Interval History   Op noted  Afebrile   No new complaints no pain  Pain OK  cx citro and clostridium    Physical Exam   Temp: 97.5  F (36.4  C) Temp src: Oral BP: 134/68 Pulse: 76   Resp: 16 SpO2: 99 % O2 Device: None (Room air)    Vitals:    08/11/21 0527   Weight: 101.9 kg (224 lb 10.4 oz)     Vital Signs with Ranges  Temp:  [96.8  F (36  C)-97.8  F (36.6  C)] 97.5  F (36.4  C)  Pulse:  [68-78] 76  Resp:  [16-18] 16  BP: (134-169)/(62-76) 134/68  SpO2:  [96 %-99 %] 99 %    Constitutional:  Awake, alert, cooperative, no apparent distress  Lungs: Clear to auscultation bilaterally, no crackles or wheezing  Cardiovascular: Regular rate and rhythm, normal S1 and S2, and no murmur noted  Abdomen: Normal bowel sounds, soft, non-distended, non-tender  Skin: No rashes, no cyanosis, no edema  Other:    Medications       aspirin  81 mg Oral Daily     atorvastatin  40 mg Oral QPM     cefTRIAXone  2 g Intravenous Q24H     clopidogrel  75 mg Oral Daily     insulin aspart  1-7 Units Subcutaneous TID AC     insulin aspart  1-5 Units Subcutaneous At Bedtime     lisinopril  5 mg Oral Daily     metroNIDAZOLE  500 mg Oral BID     polyethylene glycol  17 g Oral Daily     senna-docusate  1 tablet Oral BID     sodium chloride (PF)  10 mL Intracatheter Q8H     sodium chloride (PF)  3 mL Intracatheter Q8H       Data   All microbiology laboratory data reviewed.  Recent Labs   Lab Test 08/08/21  1140 08/07/21  1125 08/05/21  0511   WBC 12.2* 10.2 10.3   HGB 12.1* 12.2* 12.4*   HCT 37.6* 38.4* 38.6*   MCV 93 93 93    257 203     Recent Labs   Lab Test 08/08/21  1140 08/07/21  1125 08/05/21  0511   CR 1.03 0.97 0.97     Recent Labs   Lab Test 08/04/21  0753   SED 14     No lab results found.    Invalid input(s): BEBE

## 2021-08-11 NOTE — PLAN OF CARE
[id number]  A&Ox4. VSS on RA, HTN noted. Denies pain. SBA with walker. Wound vac in place, dressing CDI. Orthotic boot in place. Loss of IV access during shift, pt refused new placement of PIV due to midline being placed in AM, MD aware. Discharge to home with in home infusion services pending midline placement.

## 2021-08-11 NOTE — PROVIDER NOTIFICATION
MD Notification    Notified Person: MD    Notified Person Name: Dr. Harmon    Notification Date/Time: 08/11/21 0135    Notification Interaction: [employer] paging    Purpose of Notification: FYI- pt IV came out. Is refusing new IV at this time. Has scheduled antibiotics at 0900, scheduled for midline placement in AM. Thanks.     Orders Received:    Comments:

## 2021-08-11 NOTE — PLAN OF CARE
Physical Therapy Discharge Summary    Reason for therapy discharge:    Discharged to home with home therapy.    Progress towards therapy goal(s). See goals on Care Plan in [facility] electronic health record for goal details.  Goals partially met.  Barriers to achieving goals:   discharge from facility.    Therapy recommendation(s):    Continued therapy is recommended.  Rationale/Recommendations:  to maximize functional mob I and safety.

## 2021-08-11 NOTE — PROGRESS NOTES
"[facility] Nurse Inpatient Wound Assessment with Negative Pressure Wound Therapy     Assessment   Follow up of wound(s) on pt's: L lateral malleolus  L lateral ankle wound due to chronic ulcer from PAD with glap closure and L fibular bone excision on 8/7.    Status:stable    Treatment Plan  L lateral malleolus wound: Negative Pressure Wound Therapy (NPWT) to be changed by WOC RN every Mon/Wed/Fri with medium black foam, with Aylin over tendon and adapticcovering wound bed. Staff RN to assess pump settings set to -125 and dressing integrity every shift. Remove foam dressing and replace with BID normal saline moist gauze dressing if:  -a dressing failure which cannot be repaired within 2 hours  -patient is discharging to home without a home pump  -patient is discharging to a facility outside the local area  -if a dressing is a \"Silver Foam\", remove before Radiation Therapy or MRI    Nursing to notify the Provider(s) and re-consult the [facility] Nurse if wound(s) deteriorate(s) or if necessary to reevaluate the plan.      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 for evaluation and treatment of a left ankle wound and cellulitis. MRI was concerning for osteomyelitis of the distal fibula and pt had evidence of aterial insufficiency on MIYA. Transferred for vascular surgery consultation prior to surgical I&D.              Assessment & Plan         Left ankle wound with associated cellulitis  Osteomyelitis of distal left fibula: Pt was doing some remodeling work last week when he scraped his left anterior lower extremity and left ankle on the aluminum ladder.  He started to develop increased erythema and opening of the left ankle wound 2-3 days prior to admission.  Xray on admission with bimalleolar soft tissue swelling " with no convincing evidence of osteomyelitis.  MIYA were obtained which showed BLE arterial insufficiency.  MRI obtained which shows distal fibula osteitis and concern for early osteomyelitis. Had been on vancomycin and Zosyn since admission.  Now on just zosyn.     8/7:  S/P I&D L ankle w/ bone excision and biopsy 8/7/21, no major complications reported.  Continue abx, appreciate ID and surgical service assistance.  Limited activity planned through the weekend.     8/8:  Doing well, pain controlled.  Continues on zosyn IV.  ID following and had planned to reassess after the weekend.  Await cultures.  Podiatry does not feel that all infection necessarily removed.  He may need a longer course of abx.  Patient remains on limited activity as he is planned for wound vac placement tomorrow.  Will defer local surgical site care and activity restrictions to Podiatry.     Peripheral arterial disease s/p distal SFA and AT angioplasty with 3mm balloon complicated by distal embolization requiring thrombectomy and 2 mm distal AT/DP angioplasty (8/6/21): MIYA revealed moderate arterial insufficiency in the RLE and moderate to severe in the LLE.  - Vascular surgery following s/p procedure above.   - Plavix 300 mg X1 post-op with plan for 75 mg/day X 3 months.  Aspirin + Statin should be indefinitely used.  - Atorvastatin 40 mg po at bedtime     Objective Data  Current support surface: Standard , Atmos Air mattress  Current off-loading measures: Pillows  Containment of urine/stool: Continent  Current diet/nutrition: Orders Placed This Encounter      Consistent Carb 60 grams CHO per Meal Diet      Diet    Output: I/O last 3 completed shifts:  In: 860 [P.O.:760; I.V.:100]  Out: -   Vin:   Sensory Perception: 4-->no impairment  Moisture: 4-->rarely moist  Activity: 3-->walks occasionally  Mobility: 3-->slightly limited  Nutrition: 3-->adequate  Friction and Shear: 3-->no apparent problem  Vin Score: 20  Labs: Recent Labs   Lab  Test 08/08/21  1140 08/04/21  0753 08/04/21  0512   HGB 12.1*  --   --    WBC 12.2*  --   --    A1C  --   --  7.6*   CRP  --  122.0*  --        Physical Exam  Wound Location: L lateral malleolus  8/11/21 8/9/21      Wound History: Patient reports ulcer to L ankle fro 2.5-3 weeks prior to admission, then had a slip off the ladder last week that  Caused scrape, leading to erythema and opening of ankle wound bringing him to the hospital. Osteo for L fibula and excised 8/7 with flap covering .  Surgical date: 8/7/21  Date Negative Pressure Wound Therapy initiated: 8/9/21  Measurements: (length x width x depth in cm):2.8cm x 3.3cmx 0.1  Tunneling: N/A  Undermining: N/A  Wound Base: moist red/pink tissue with white tendon exposed  Palpation of the wound bed:  Normal, spongy tedon  Periwound Skin: maceration,sutures in place surrounding open wound  Color: pale and pink  Temperature  normal   Drainage: Amount: moderate  Color: serosanguinous   Odor: none  Pain:  absent     Was patient premedicated prior to dressing change? No    Medication(s) used:  verbal      Intervention:     Visual inspection of wound(s):  Wound was fully assessed.  Wound Care: was done  Orders:  written  Supplies: comfeel to periwound. Aylin, Adaptic, and Medium Black Foam to wound bed  Discussed plan of care with: patient, daughter, and RN    Gabriela Colmenares RN CWOCN.

## 2021-08-12 ENCOUNTER — PATIENT OUTREACH (OUTPATIENT)
Dept: CARE COORDINATION | Facility: CLINIC | Age: 71
End: 2021-08-12

## 2021-08-12 DIAGNOSIS — Z71.89 OTHER SPECIFIED COUNSELING: ICD-10-CM

## 2021-08-12 LAB
PATH REPORT.COMMENTS IMP SPEC: NORMAL
PATH REPORT.COMMENTS IMP SPEC: NORMAL
PATH REPORT.FINAL DX SPEC: NORMAL
PATH REPORT.GROSS SPEC: NORMAL
PATH REPORT.MICROSCOPIC SPEC OTHER STN: NORMAL
PHOTO IMAGE: NORMAL

## 2021-08-12 PROCEDURE — 88311 DECALCIFY TISSUE: CPT | Mod: 26 | Performed by: PATHOLOGY

## 2021-08-12 PROCEDURE — 88307 TISSUE EXAM BY PATHOLOGIST: CPT | Mod: 26 | Performed by: PATHOLOGY

## 2021-08-12 NOTE — PROGRESS NOTES
"Clinic Care Coordination Contact  [facility]: Post-Discharge Note  SITUATION                                                      Admission:    Admission Date: 08/05/21   Reason for Admission: Left ankle wound with associated cellulitis  Discharge:   Discharge Date: 08/11/21  Discharge Diagnosis: Left ankle wound with associated cellulitis    BACKGROUND                                                      Rangel Yang is a 71 year old male with a history of T2DM with peripheral neuropathy and previous left 5th toe amputation who was initially admitted 8/3/21 to [facility] for evaluation and treatment of a left ankle wound and cellulitis. MRI was concerning for osteomyelitis of the distal fibula and pt had evidence of aterial insufficiency on MIYA. Transferred for vascular surgery consultation prior to surgical I&D    ASSESSMENT      Discharge Assessment  How are you doing now that you are home?: \"Fine, had a nice sleep last night\"  How are your symptoms? (Red Flag symptoms escalate to triage hotline per guidelines): Improved  Do you feel your condition is stable enough to be safe at home until your provider visit?: Yes  Does the patient have their discharge instructions? : Yes  Does the patient have questions regarding their discharge instructions? : No  Were you started on any new medications or were there changes to any of your previous medications? : No  Does the patient have all of their medications?: Yes  Do you have questions regarding any of your medications? : No  Do you have all of your needed medical supplies or equipment (DME)?  (i.e. oxygen tank, CPAP, cane, etc.): Yes  Discharge follow-up appointment scheduled within 14 calendar days? : No  Is patient agreeable to assistance with scheduling? : No    Post-op  Did the patient have surgery or a procedure: Yes  Incision: other  Drainage: No  Bleeding: none  Fever: No  Chills: No  Redness: No  Warmth: No  Swelling: No  Incision site pain: " No  Closure: non-dissolving  Eating & Drinking: eating and drinking without complaints/concerns  PO Intake: regular diet  Bowel Function: normal  Urinary Status: voiding without complaint/concerns        PLAN                                                      Outpatient Plan:  Follow-up Appointments     Follow Up      Follow up with Dr. Whitley or team members in 2 weeks.    No future appointments.      For any urgent concerns, please contact our 24 hour nurse triage line: [phone] ([phone])         Gaby Levi

## 2021-08-13 ENCOUNTER — TELEPHONE (OUTPATIENT)
Dept: PODIATRY | Facility: CLINIC | Age: 71
End: 2021-08-13

## 2021-08-13 NOTE — TELEPHONE ENCOUNTER
Spoke with Hipolito. Verbal orders given for below:      Orders for skilled nursing 3x a week, would care, and PT/OT evaluation.    BRIDGETTE MaiM

## 2021-08-13 NOTE — TELEPHONE ENCOUNTER
Hipolito from [facility], [facility] calling for verbal orders from Dr. Whitley.    Orders for skilled nursing 3x a week, would care, and PT/OT evaluation.     Number for verbal orders [phone]    Amairani Tuttle MS ATC

## 2021-08-14 LAB — BACTERIA WND CULT: ABNORMAL

## 2021-08-16 ENCOUNTER — LAB REQUISITION (OUTPATIENT)
Dept: LAB | Facility: CLINIC | Age: 71
End: 2021-08-16
Payer: MEDICARE

## 2021-08-16 LAB
AST SERPL W P-5'-P-CCNC: 39 U/L (ref 0–45)
BASOPHILS # BLD AUTO: 0.1 10E3/UL (ref 0–0.2)
BASOPHILS NFR BLD AUTO: 1 %
CREAT SERPL-MCNC: 1.09 MG/DL (ref 0.66–1.25)
CRP SERPL-MCNC: 6 MG/L (ref 0–8)
EOSINOPHIL # BLD AUTO: 0.3 10E3/UL (ref 0–0.7)
EOSINOPHIL NFR BLD AUTO: 2 %
ERYTHROCYTE [DISTWIDTH] IN BLOOD BY AUTOMATED COUNT: 13.7 % (ref 10–15)
ERYTHROCYTE [SEDIMENTATION RATE] IN BLOOD BY WESTERGREN METHOD: 14 MM/HR (ref 0–20)
GFR SERPL CREATININE-BSD FRML MDRD: 68 ML/MIN/1.73M2
HCT VFR BLD AUTO: 43.8 % (ref 40–53)
HGB BLD-MCNC: 14 G/DL (ref 13.3–17.7)
HOLD SPECIMEN: NORMAL
IMM GRANULOCYTES # BLD: 0.1 10E3/UL
IMM GRANULOCYTES NFR BLD: 1 %
LYMPHOCYTES # BLD AUTO: 2.4 10E3/UL (ref 0.8–5.3)
LYMPHOCYTES NFR BLD AUTO: 17 %
MCH RBC QN AUTO: 30.2 PG (ref 26.5–33)
MCHC RBC AUTO-ENTMCNC: 32 G/DL (ref 31.5–36.5)
MCV RBC AUTO: 95 FL (ref 78–100)
MONOCYTES # BLD AUTO: 0.7 10E3/UL (ref 0–1.3)
MONOCYTES NFR BLD AUTO: 5 %
NEUTROPHILS # BLD AUTO: 10.8 10E3/UL (ref 1.6–8.3)
NEUTROPHILS NFR BLD AUTO: 74 %
NRBC # BLD AUTO: 0 10E3/UL
NRBC BLD AUTO-RTO: 0 /100
PLATELET # BLD AUTO: 421 10E3/UL (ref 150–450)
RBC # BLD AUTO: 4.63 10E6/UL (ref 4.4–5.9)
WBC # BLD AUTO: 14.3 10E3/UL (ref 4–11)

## 2021-08-16 PROCEDURE — 85025 COMPLETE CBC W/AUTO DIFF WBC: CPT | Mod: ORL | Performed by: INTERNAL MEDICINE

## 2021-08-16 PROCEDURE — 84450 TRANSFERASE (AST) (SGOT): CPT | Mod: ORL | Performed by: INTERNAL MEDICINE

## 2021-08-16 PROCEDURE — 86140 C-REACTIVE PROTEIN: CPT | Mod: ORL | Performed by: INTERNAL MEDICINE

## 2021-08-16 PROCEDURE — 85652 RBC SED RATE AUTOMATED: CPT | Mod: ORL | Performed by: INTERNAL MEDICINE

## 2021-08-16 PROCEDURE — 82565 ASSAY OF CREATININE: CPT | Mod: ORL | Performed by: INTERNAL MEDICINE

## 2021-08-23 ENCOUNTER — LAB REQUISITION (OUTPATIENT)
Dept: LAB | Facility: CLINIC | Age: 71
End: 2021-08-23
Payer: MEDICARE

## 2021-08-23 LAB
AST SERPL W P-5'-P-CCNC: 20 U/L (ref 0–45)
BASOPHILS # BLD AUTO: 0.1 10E3/UL (ref 0–0.2)
BASOPHILS NFR BLD AUTO: 1 %
CREAT SERPL-MCNC: 1.06 MG/DL (ref 0.66–1.25)
CRP SERPL-MCNC: 5.3 MG/L (ref 0–8)
EOSINOPHIL # BLD AUTO: 0.4 10E3/UL (ref 0–0.7)
EOSINOPHIL NFR BLD AUTO: 4 %
ERYTHROCYTE [DISTWIDTH] IN BLOOD BY AUTOMATED COUNT: 13.7 % (ref 10–15)
ERYTHROCYTE [SEDIMENTATION RATE] IN BLOOD BY WESTERGREN METHOD: 17 MM/HR (ref 0–20)
GFR SERPL CREATININE-BSD FRML MDRD: 71 ML/MIN/1.73M2
HCT VFR BLD AUTO: 43.1 % (ref 40–53)
HGB BLD-MCNC: 14 G/DL (ref 13.3–17.7)
HOLD SPECIMEN: NORMAL
IMM GRANULOCYTES # BLD: 0.1 10E3/UL
IMM GRANULOCYTES NFR BLD: 1 %
LYMPHOCYTES # BLD AUTO: 2.7 10E3/UL (ref 0.8–5.3)
LYMPHOCYTES NFR BLD AUTO: 26 %
MCH RBC QN AUTO: 30.4 PG (ref 26.5–33)
MCHC RBC AUTO-ENTMCNC: 32.5 G/DL (ref 31.5–36.5)
MCV RBC AUTO: 94 FL (ref 78–100)
MONOCYTES # BLD AUTO: 0.6 10E3/UL (ref 0–1.3)
MONOCYTES NFR BLD AUTO: 6 %
NEUTROPHILS # BLD AUTO: 6.5 10E3/UL (ref 1.6–8.3)
NEUTROPHILS NFR BLD AUTO: 62 %
NRBC # BLD AUTO: 0 10E3/UL
NRBC BLD AUTO-RTO: 0 /100
PLATELET # BLD AUTO: 361 10E3/UL (ref 150–450)
RBC # BLD AUTO: 4.61 10E6/UL (ref 4.4–5.9)
WBC # BLD AUTO: 10.3 10E3/UL (ref 4–11)

## 2021-08-23 PROCEDURE — 85025 COMPLETE CBC W/AUTO DIFF WBC: CPT | Mod: ORL | Performed by: INTERNAL MEDICINE

## 2021-08-23 PROCEDURE — 86140 C-REACTIVE PROTEIN: CPT | Mod: ORL | Performed by: INTERNAL MEDICINE

## 2021-08-23 PROCEDURE — 82565 ASSAY OF CREATININE: CPT | Mod: ORL | Performed by: INTERNAL MEDICINE

## 2021-08-23 PROCEDURE — 85652 RBC SED RATE AUTOMATED: CPT | Mod: ORL | Performed by: INTERNAL MEDICINE

## 2021-08-23 PROCEDURE — 84450 TRANSFERASE (AST) (SGOT): CPT | Mod: ORL | Performed by: INTERNAL MEDICINE

## 2021-08-23 PROCEDURE — 36415 COLL VENOUS BLD VENIPUNCTURE: CPT | Mod: ORL | Performed by: INTERNAL MEDICINE

## 2021-08-26 ENCOUNTER — TELEPHONE (OUTPATIENT)
Dept: PODIATRY | Facility: CLINIC | Age: 71
End: 2021-08-26

## 2021-08-26 ENCOUNTER — OFFICE VISIT (OUTPATIENT)
Dept: PODIATRY | Facility: CLINIC | Age: 71
End: 2021-08-26
Payer: MEDICARE

## 2021-08-26 VITALS
HEIGHT: 70 IN | WEIGHT: 224 LBS | DIASTOLIC BLOOD PRESSURE: 70 MMHG | SYSTOLIC BLOOD PRESSURE: 132 MMHG | BODY MASS INDEX: 32.07 KG/M2

## 2021-08-26 DIAGNOSIS — Z98.890 POST-OPERATIVE STATE: ICD-10-CM

## 2021-08-26 DIAGNOSIS — I73.9 PAD (PERIPHERAL ARTERY DISEASE) (H): ICD-10-CM

## 2021-08-26 DIAGNOSIS — E11.42 DIABETIC POLYNEUROPATHY ASSOCIATED WITH TYPE 2 DIABETES MELLITUS (H): Primary | ICD-10-CM

## 2021-08-26 PROCEDURE — 99024 POSTOP FOLLOW-UP VISIT: CPT | Performed by: PODIATRIST

## 2021-08-26 ASSESSMENT — MIFFLIN-ST. JEOR: SCORE: 1782.31

## 2021-08-26 NOTE — TELEPHONE ENCOUNTER
Patient saw Dr. Whitley today alone. His daughter has some questions and would like to speak with Dr. Whitley.    Tel: [phone]

## 2021-08-26 NOTE — PROGRESS NOTES
"Podiatry / Foot and Ankle Surgery Progress Note    August 26, 2021    Subject: Patient was seen for follow up on 2 weeks status post left I&D with bone debridement of the fibula for osteomyelitis.  Patient notes he is doing well.  Is getting home care 3 times a week for wound VAC dressing changes and has his PICC line and that he has been administering his antibiotics himself.  Denies fever, nausea, vomiting.    Objective:  Vitals: /70   Ht 1.778 m (5' 10\")   Wt 101.6 kg (224 lb)   BMI 32.14 kg/m    BMI= Body mass index is 32.14 kg/m .    A1C: 7.6 (8/2021)    General:  Patient is alert and orientated.  NAD.    Vascular:  DP and PT pulses are faintly palpable.  No edema or varicosities noted.  CFT's < 3secs.  Skin temp is normal.    Neuro:  Light and gross touch sensation diminished to feet.    Derm: Wound VAC to left ankle was removed.  Incision are intact.  Sutures are intact.  Area of ulceration is filling in with red granular tissue.  This measures approximately 4 cm x 2 cm x 0.2 cm.  No surrounding erythema, purulent drainage or malodor noted..    Musculoskeletal: Previous left fifth toe amputation.      Pathology: Left ankle bone, biopsy:  -Necrotic bone without evidence of osteomyelitis    Assessment:    Diabetic polyneuropathy associated with type 2 diabetes mellitus (H)  Post-operative state  PAD (peripheral artery disease) (H)    Medical Decision Making/Plan: At this time the dressing was changed.  We will have them reapply the wound VAC on tomorrow when home care comes.  Put a dry dressing on today.  We will continue the wound VAC for the next 2 weeks.  We will have him follow-up then and we will remove the sutures.  We will have him do dry dressing changes as he wants to get back to [place] and we will have him follow-up with his podiatrist about there.  All questions were answered to patient satisfaction and he will call further questions or concerns.      Patient Risk Factor:  Patient is a " high risk factor for infection.     Kina Whitley DPM, Podiatry/Foot and Ankle Surgery

## 2021-08-26 NOTE — TELEPHONE ENCOUNTER
Phone call to patient and informed of daughter's call. He gives permission to speak to her. He asks when he is supposed to follow up with ID. Per hospital discharge, he is to follow up with them 2 weeks post discharge. He states he will contact them.   He wants to get back to [place] where he is from as soon as possible. He asks how long the wound vac needs to stay on.   Per office visit note from Dr. Whitley today:    We will continue the wound VAC for the next 2 weeks.  We will have him follow-up then and we will remove the sutures.  We will have him do dry dressing changes as he wants to get back to [place] and we will have him follow-up with his podiatrist about there.     Patient verbalized understanding.     Will keep encounter open for call back from daughter.     EROS Faith RN

## 2021-08-26 NOTE — LETTER
"    8/26/2021         RE: Rangel Yang  [address] [facility] [address]        Dear Colleague,    Thank you for referring your patient, aRngel Yang, to the [facility]. Please see a copy of my visit note below.    Podiatry / Foot and Ankle Surgery Progress Note    August 26, 2021    Subject: Patient was seen for follow up on 2 weeks status post left I&D with bone debridement of the fibula for osteomyelitis.  Patient notes he is doing well.  Is getting home care 3 times a week for wound VAC dressing changes and has his PICC line and that he has been administering his antibiotics himself.  Denies fever, nausea, vomiting.    Objective:  Vitals: /70   Ht 1.778 m (5' 10\")   Wt 101.6 kg (224 lb)   BMI 32.14 kg/m    BMI= Body mass index is 32.14 kg/m .    A1C: 7.6 (8/2021)    General:  Patient is alert and orientated.  NAD.    Vascular:  DP and PT pulses are faintly palpable.  No edema or varicosities noted.  CFT's < 3secs.  Skin temp is normal.    Neuro:  Light and gross touch sensation diminished to feet.    Derm: Wound VAC to left ankle was removed.  Incision are intact.  Sutures are intact.  Area of ulceration is filling in with red granular tissue.  This measures approximately 4 cm x 2 cm x 0.2 cm.  No surrounding erythema, purulent drainage or malodor noted..    Musculoskeletal: Previous left fifth toe amputation.      Pathology: Left ankle bone, biopsy:  -Necrotic bone without evidence of osteomyelitis    Assessment:    Diabetic polyneuropathy associated with type 2 diabetes mellitus (H)  Post-operative state  PAD (peripheral artery disease) (H)    Medical Decision Making/Plan: At this time the dressing was changed.  We will have them reapply the wound VAC on tomorrow when home care comes.  Put a dry dressing on today.  We will continue the wound VAC for the next 2 weeks.  We will have him follow-up then and we will remove the sutures.  We will have " him do dry dressing changes as he wants to get back to [place] and we will have him follow-up with his podiatrist about there.  All questions were answered to patient satisfaction and he will call further questions or concerns.      Patient Risk Factor:  Patient is a high risk factor for infection.     Kina Whitley DPM, Podiatry/Foot and Ankle Surgery        Again, thank you for allowing me to participate in the care of your patient.        Sincerely,        Kina Whitley DPM, Podiatry/Foot and Ankle Surgery

## 2021-08-26 NOTE — TELEPHONE ENCOUNTER
There is no consent to communicate on file to speak with daughter, Leonora Yang. Left voicemail asking Leonora to return call.     EROS Faith RN

## 2021-08-30 ENCOUNTER — LAB REQUISITION (OUTPATIENT)
Dept: LAB | Facility: CLINIC | Age: 71
End: 2021-08-30
Payer: MEDICARE

## 2021-08-30 LAB
AST SERPL W P-5'-P-CCNC: 26 U/L (ref 0–45)
BASOPHILS # BLD AUTO: 0.1 10E3/UL (ref 0–0.2)
BASOPHILS NFR BLD AUTO: 1 %
CREAT SERPL-MCNC: 1.14 MG/DL (ref 0.66–1.25)
CRP SERPL-MCNC: 3.3 MG/L (ref 0–8)
EOSINOPHIL # BLD AUTO: 0.4 10E3/UL (ref 0–0.7)
EOSINOPHIL NFR BLD AUTO: 5 %
ERYTHROCYTE [DISTWIDTH] IN BLOOD BY AUTOMATED COUNT: 14 % (ref 10–15)
ERYTHROCYTE [SEDIMENTATION RATE] IN BLOOD BY WESTERGREN METHOD: 13 MM/HR (ref 0–20)
GFR SERPL CREATININE-BSD FRML MDRD: 65 ML/MIN/1.73M2
HCT VFR BLD AUTO: 43.7 % (ref 40–53)
HGB BLD-MCNC: 14.1 G/DL (ref 13.3–17.7)
IMM GRANULOCYTES # BLD: 0 10E3/UL
IMM GRANULOCYTES NFR BLD: 0 %
LYMPHOCYTES # BLD AUTO: 1.7 10E3/UL (ref 0.8–5.3)
LYMPHOCYTES NFR BLD AUTO: 19 %
MCH RBC QN AUTO: 30.2 PG (ref 26.5–33)
MCHC RBC AUTO-ENTMCNC: 32.3 G/DL (ref 31.5–36.5)
MCV RBC AUTO: 94 FL (ref 78–100)
MONOCYTES # BLD AUTO: 0.5 10E3/UL (ref 0–1.3)
MONOCYTES NFR BLD AUTO: 6 %
NEUTROPHILS # BLD AUTO: 5.9 10E3/UL (ref 1.6–8.3)
NEUTROPHILS NFR BLD AUTO: 69 %
NRBC # BLD AUTO: 0 10E3/UL
NRBC BLD AUTO-RTO: 0 /100
PLATELET # BLD AUTO: 236 10E3/UL (ref 150–450)
RBC # BLD AUTO: 4.67 10E6/UL (ref 4.4–5.9)
WBC # BLD AUTO: 8.6 10E3/UL (ref 4–11)

## 2021-08-30 PROCEDURE — 86140 C-REACTIVE PROTEIN: CPT | Performed by: INTERNAL MEDICINE

## 2021-08-30 PROCEDURE — 84450 TRANSFERASE (AST) (SGOT): CPT | Performed by: INTERNAL MEDICINE

## 2021-08-30 PROCEDURE — 85025 COMPLETE CBC W/AUTO DIFF WBC: CPT | Performed by: INTERNAL MEDICINE

## 2021-08-30 PROCEDURE — 82565 ASSAY OF CREATININE: CPT | Performed by: INTERNAL MEDICINE

## 2021-08-30 PROCEDURE — 85652 RBC SED RATE AUTOMATED: CPT | Performed by: INTERNAL MEDICINE

## 2021-09-08 ENCOUNTER — TELEPHONE (OUTPATIENT)
Dept: PODIATRY | Facility: CLINIC | Age: 71
End: 2021-09-08

## 2021-09-08 NOTE — TELEPHONE ENCOUNTER
Patient calls back stating Home Care is on their way to see him now. Asked that they contact our office if they have any questions for Dr. Whitley. He verbalized understanding.     EROS Faith RN

## 2021-09-08 NOTE — TELEPHONE ENCOUNTER
Patient calls stating he has home care coming Monday, Wednesday, Friday for wound vac dressing changes after having surgery with Dr. Whitley. They came on 9/6/21 and changed the dressing. There hasn't been any drainage in the container or the tubing. The wound vac pump seems to be working but he does not think it is sucking. It is making more frequent noise than before. There is bleeding through his ACE bandage and he is concerned the pump isn't working correctly and that he may get infection with the dressing being wet. He is also diabetic.   He has contacted home health and they had changed his appointment to tomorrow without his knowledge. He is waiting for a call back from Home Care since noon.   He was getting another call and will call writer back.     EROS Faith RN

## 2021-09-14 ENCOUNTER — OFFICE VISIT (OUTPATIENT)
Dept: PODIATRY | Facility: CLINIC | Age: 71
End: 2021-09-14
Payer: MEDICARE

## 2021-09-14 ENCOUNTER — TELEPHONE (OUTPATIENT)
Dept: PODIATRY | Facility: CLINIC | Age: 71
End: 2021-09-14

## 2021-09-14 VITALS
HEIGHT: 70 IN | DIASTOLIC BLOOD PRESSURE: 56 MMHG | WEIGHT: 224 LBS | SYSTOLIC BLOOD PRESSURE: 96 MMHG | BODY MASS INDEX: 32.07 KG/M2

## 2021-09-14 DIAGNOSIS — I73.9 PAD (PERIPHERAL ARTERY DISEASE) (H): ICD-10-CM

## 2021-09-14 DIAGNOSIS — Z98.890 POST-OPERATIVE STATE: ICD-10-CM

## 2021-09-14 DIAGNOSIS — L97.322 ULCER OF LEFT ANKLE, WITH FAT LAYER EXPOSED (H): ICD-10-CM

## 2021-09-14 DIAGNOSIS — E11.42 DIABETIC POLYNEUROPATHY ASSOCIATED WITH TYPE 2 DIABETES MELLITUS (H): Primary | ICD-10-CM

## 2021-09-14 PROCEDURE — 11042 DBRDMT SUBQ TIS 1ST 20SQCM/<: CPT | Mod: 78 | Performed by: PODIATRIST

## 2021-09-14 PROCEDURE — 99024 POSTOP FOLLOW-UP VISIT: CPT | Performed by: PODIATRIST

## 2021-09-14 RX ORDER — SULFAMETHOXAZOLE/TRIMETHOPRIM 800-160 MG
1 TABLET ORAL 2 TIMES DAILY
COMMUNITY
Start: 2021-08-31 | End: 2021-10-01

## 2021-09-14 RX ORDER — METRONIDAZOLE 500 MG/1
500 TABLET ORAL 2 TIMES DAILY
COMMUNITY
Start: 2020-12-30 | End: 2021-10-01

## 2021-09-14 ASSESSMENT — MIFFLIN-ST. JEOR: SCORE: 1782.31

## 2021-09-14 NOTE — TELEPHONE ENCOUNTER
[facility], calls stating they received a fax from Dr. Whitley and they are not the correct recipient. They provide IV antibiotics only for the patient and do not do home care. Apologized for the error and asked that she shred the information. She states she will do so.     Dr. Whitley informed. She states patient gave her that information at appointment today and daughter was disagreeing with patient. Will contact patient.     Phone call to patient and informed of the above. He states he though [facility] was doing both. Otherwise, he does not know and will have to look through his paperwork. Offered to see if [facility] was providing services, and patient would like writer to do that.   Will call him if we need more information.     Phone call to Austin Camarena [facility]. Patient is active with them and Hipolito is his . She will have her return writer's call. Asked for fax number so that we may send updated wound care orders today.   F:[phone].     Note from today's visit faxed to above number, attention: Hipolito. Confirmed it went through via [employer].       EROS Faith RN

## 2021-09-14 NOTE — TELEPHONE ENCOUNTER
Phone call to Hipolito and provided updated wound care orders per today's note and to discontinue wound vac. Also let her know the office visit note was faxed with new orders.   She will be seeing patient tomorrow and will need to order supplies. They most likely will not be able to get Iodosorb gel until Friday. She asks what can be used in the meantime. They do not have iodine.   Will discuss with provider and get back with her.     Please advise a substitute for Iodosorb gel until it is available.     EROS Faith RN

## 2021-09-14 NOTE — TELEPHONE ENCOUNTER
Updated orders to discontinue with wound vac and switch to 3x a week dressing changes with iodofoam, 4x4 gauze pads, large kerlix roll and ace bandage. Faxed to [facility] at [phone].    Conchis FREEMAN CMA

## 2021-09-14 NOTE — LETTER
"    9/14/2021         RE: Rangel Yang  [address] [facility] [address]        Dear Colleague,    Thank you for referring your patient, Rangel Yang, to the [facility] PODIATRY. Please see a copy of my visit note below.    Podiatry / Foot and Ankle Surgery Progress Note    September 14, 2021    Subject: Patient was seen for follow up on 5-week status post I&D left heel for bone infection.  Here with daughter.  Patient notes that there is been a lot of drainage and leaking from the wound VAC.  Patient denies fever, nausea, vomiting.  Wondering when he can get back to [place].  Recently was switched to oral antibiotics from ID.    Objective:  Vitals: BP 96/56   Ht 1.778 m (5' 10\")   Wt 101.6 kg (224 lb)   BMI 32.14 kg/m    BMI= Body mass index is 32.14 kg/m .    A1C: 7.6 (8/2021)    General:  Patient is alert and orientated.  NAD    Vascular:  DP and PT pulses are faintly palpable.  No edema or varicosities noted.  CFT's < 3secs.  Skin temp is normal.     Neuro:  Light and gross touch sensation diminished to feet.     Derm: Wound VAC to left ankle was removed.  Incision are intact.  Sutures are intact.  Area of ulceration is filling in with red granular tissue.  This measures approximately 4 cm x 3 cm x 0.4 cm.  No surrounding erythema, purulent drainage or malodor noted..     Musculoskeletal: Previous left fifth toe amputation.        Pathology: Left ankle bone, biopsy:  -Necrotic bone without evidence of osteomyelitis     Assessment:     Diabetic polyneuropathy associated with type 2 diabetes mellitus (H)  Post-operative state  Ulcer of left ankle, with fat layer exposed (H)  PAD (peripheral artery disease) (H)       Medical Decision Making/Plan: At this time, the wound was debrided.  We will discontinue the VAC.  He can send the wound VAC back.  We will have his home care which is [facility] do 3 times a week dressing changes with iodoform to the ulcer as a " primary dressing and then cover this with four 4 x 4 gauze pads, 1 large Kerlix roll and a 4 inch Ace bandage as secondary dressings.  We will have him call his podiatrist out in [place] to establish care out there for him to move back. All questions were answered to patient satisfaction and he will call further questions or concerns.     PRocedure: After verbal consent, excisional debridement was performed on ulcer.  #15 blade was used to debride ulcer down to and including subcutaneous tissue. Bleeding controlled with light pressure.   No drainage noted.  No anesthesia was used due to neuropathy. Dry dressing applied to foot.  Patient tolerated procedure well.       Patient Risk Factor:  Patient is a high risk factor for infection.     Kina Whitley DPM, Podiatry/Foot and Ankle Surgery        Again, thank you for allowing me to participate in the care of your patient.        Sincerely,        Kina Whitley DPM, Podiatry/Foot and Ankle Surgery

## 2021-09-14 NOTE — TELEPHONE ENCOUNTER
Aqu[employer] is fine until they can get iodosorb gel.     Please let them know.     Kina fuentes DPM

## 2021-09-14 NOTE — PROGRESS NOTES
"Podiatry / Foot and Ankle Surgery Progress Note    September 14, 2021    Subject: Patient was seen for follow up on 5-week status post I&D left heel for bone infection.  Here with daughter.  Patient notes that there is been a lot of drainage and leaking from the wound VAC.  Patient denies fever, nausea, vomiting.  Wondering when he can get back to [place].  Recently was switched to oral antibiotics from ID.    Objective:  Vitals: BP 96/56   Ht 1.778 m (5' 10\")   Wt 101.6 kg (224 lb)   BMI 32.14 kg/m    BMI= Body mass index is 32.14 kg/m .    A1C: 7.6 (8/2021)    General:  Patient is alert and orientated.  NAD    Vascular:  DP and PT pulses are faintly palpable.  No edema or varicosities noted.  CFT's < 3secs.  Skin temp is normal.     Neuro:  Light and gross touch sensation diminished to feet.     Derm: Wound VAC to left ankle was removed.  Incision are intact.  Sutures are intact.  Area of ulceration is filling in with red granular tissue.  This measures approximately 4 cm x 3 cm x 0.4 cm.  No surrounding erythema, purulent drainage or malodor noted.  There is heavy amounts of clear serous drainage from the wound with maceration around the wound.  Sutures are intact.  Base of the wound is 80% granular with 20% fibrous slough.  Subcutaneous tissue layer is exposed but no exposure of muscle or bone.     Musculoskeletal: Previous left fifth toe amputation.        Pathology: Left ankle bone, biopsy:  -Necrotic bone without evidence of osteomyelitis     Assessment:     Diabetic polyneuropathy associated with type 2 diabetes mellitus (H)  Post-operative state  Ulcer of left ankle, with fat layer exposed (H)  PAD (peripheral artery disease) (H)       Medical Decision Making/Plan: At this time, the sutures were removed and the wound was debrided.  We will discontinue the VAC.  He can send the wound VAC back.  We will have his home care which is [facility] do 3 times a week dressing changes with iodoform to the " ulcer as a primary dressing and then cover this with four 4 x 4 gauze pads, 1 large Kerlix roll and a 4 inch Ace bandage as secondary dressings.  We will have him call his podiatrist out in [place] to establish care out there for him to move back. All questions were answered to patient satisfaction and he will call further questions or concerns.     PRocedure: After verbal consent, excisional debridement was performed on ulcer.  #15 blade was used to debride ulcer down to and including subcutaneous tissue. Bleeding controlled with light pressure.   No drainage noted.  No anesthesia was used due to neuropathy. Dry dressing applied to foot.  Patient tolerated procedure well.       Patient Risk Factor:  Patient is a high risk factor for infection.     Kina Whitley DPM, Podiatry/Foot and Ankle Surgery

## 2021-09-14 NOTE — PATIENT INSTRUCTIONS
Thank you for choosing [facility] Podiatry / Foot & Ankle Surgery!    DR. ROGEL'S CLINIC:  [facility]   [address]   [address]   [place] [address]   [phone]  [phone]      SCHEDULE SURGERY: [phone]   BILLING QUESTIONS: [phone]   APPOINTMENTS: [phone]   CONSUMER PRICE LINE: [phone]      Follow up: With podiatrist on [place] in 3 weeks    Next steps: We will change home care dressings.  We will discontinue the VAC.

## 2021-09-14 NOTE — TELEPHONE ENCOUNTER
Reason for call:  Patient returning call his Home Care is [facility]    Phone number to reach patient:  Home number on file [phone] (home)    Best Time:  unknown    Can we leave a detailed message on this number?  unknown

## 2021-09-15 ENCOUNTER — TELEPHONE (OUTPATIENT)
Dept: PODIATRY | Facility: CLINIC | Age: 71
End: 2021-09-15

## 2021-09-15 NOTE — TELEPHONE ENCOUNTER
Reason for call:[facility] Nurse would like more clarification on supply orders    Phone number to reach patient:  [phone]    Best Time:  unknown    Can we leave a detailed message on this number?  unknown

## 2021-09-15 NOTE — TELEPHONE ENCOUNTER
Hipolito left another message needing to clarify wound care orders. She states she received a faxed order for iodoform to the wound, not iodosorb gel. She also spoke with Conchis yesterday her told her iodosorb gel.     Phone call to Hipolito and apologized for the confusion. She states she was able to get the iodosorb gel today from the wound care nurse and used it on patient today. She was informed that iodosorb gel was the correct order. She verbalized understanding.     EROS Faith RN

## 2021-09-21 ENCOUNTER — TELEPHONE (OUTPATIENT)
Dept: PODIATRY | Facility: CLINIC | Age: 71
End: 2021-09-21

## 2021-09-21 NOTE — TELEPHONE ENCOUNTER
I faxed over orders for Dr Whitley to sign and fax back to [phone]. This was completed and placed in abstracting.

## 2021-10-05 ENCOUNTER — TELEPHONE (OUTPATIENT)
Dept: PODIATRY | Facility: CLINIC | Age: 71
End: 2021-10-05

## 2021-10-05 NOTE — TELEPHONE ENCOUNTER
Orders faxed from [facility]. Completed, faxed back to [phone], and placed in abstracting.

## 2021-10-07 ENCOUNTER — MEDICAL CORRESPONDENCE (OUTPATIENT)
Dept: HEALTH INFORMATION MANAGEMENT | Facility: CLINIC | Age: 71
End: 2021-10-07

## 2021-10-08 ENCOUNTER — TELEPHONE (OUTPATIENT)
Dept: PODIATRY | Facility: CLINIC | Age: 71
End: 2021-10-08

## 2021-10-08 NOTE — TELEPHONE ENCOUNTER
Orders faxed from [facility]. Once completed, faxed back to [phone], and place in abstracting.    Anthony Trevizo MA

## 2022-08-18 ENCOUNTER — TELEPHONE (OUTPATIENT)
Dept: OTHER | Facility: CLINIC | Age: 72
End: 2022-08-18

## 2022-08-18 DIAGNOSIS — I73.9 PAD (PERIPHERAL ARTERY DISEASE) (H): Primary | ICD-10-CM

## 2022-08-18 NOTE — TELEPHONE ENCOUNTER
Who is the name of the provider?:  Previous pt of Dr. Manuel and Dr. Whitley        What is the location you see this provider at?:  [place]        Reason for call:  Pt has seen Dr. Manuel and Dr. Whitley in the past. He had surgery on right foot  in [place] on 5/27/22. He would like to follow up with a vascular surgeon here in [place]. He has  imaging due from his doctors in [place]  and he wants to know if he can get that done here and establish care.          Contact name: Rangel       Contact number: [phone]

## 2022-08-18 NOTE — TELEPHONE ENCOUNTER
"Need records from MD in [place].     Called pt back, pt report \"Lost Right foot, up for getting prosthetic. Need approval on leg to go forward on using prosthetic.\"   Pt think daughter has information in hand.  June of 2022.   LOV June 2022 in [place], came to [place] in July 2022.    Pt will call previous provider in [place] and have med records/imaging sent to [facility] for review and appropriate referral coordination.     S/p 8/6/21 Left lower extremity angiogram with balloon angioplasty by Dr. 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     Needs MIYA.  Left LE arterial U/S...    Awaiting medical records/imaging from [place].     ELIAS Lopez, RN  [facility]  Office:  [phone] Fax: [phone]    "

## 2022-08-21 ENCOUNTER — LAB REQUISITION (OUTPATIENT)
Dept: LAB | Facility: CLINIC | Age: 72
End: 2022-08-21
Payer: MEDICARE

## 2022-08-21 DIAGNOSIS — E11.00 TYPE 2 DIABETES MELLITUS WITH HYPEROSMOLARITY WITHOUT NONKETOTIC HYPERGLYCEMIC-HYPEROSMOLAR COMA (NKHHC) (H): ICD-10-CM

## 2022-08-22 NOTE — TELEPHONE ENCOUNTER
As of today we have still not received any records for patient from [place].   As a new patient to [facility] we need all of his medical history and records from [place]- once they are received we will review and advise.    Alexandria GRIFFIN, RN    [facility]  Office: [phone]  Fax: [phone]

## 2022-08-22 NOTE — TELEPHONE ENCOUNTER
Patient calling to follow up on request for appointment so he can proceed with getting for fitted for prosthetic.   1) States on 8/19 he requested records be sent by [place] provider. Has [facility] received them?  2) Questioning why the need to wait for records since he does not have the foot any longer.  Wants to get the US so he can move forward with getting fitted.     States he does not understand       Phone: [phone]

## 2022-08-23 NOTE — TELEPHONE ENCOUNTER
Patient called back for status update. Patient stated that information was faxed to [facility] on 8/22. I reached out to [facility] to confirm transmission and they stated that no records had been sent to [facility]. Their Medical Records Department provided the following information:    [facility] Medical Records phone [phone] fax [phone]    The agent at [facility] stated that we could fax them a stat request

## 2022-08-23 NOTE — TELEPHONE ENCOUNTER
Spoke with patient and scheduled the following:    MIYA/TBI on 8/29/22 at [facility]     Follow up with Dr. Champion on 9/8/22 in [place].    Patient would like to be seen sooner and I added his name to the cancellation list.

## 2022-08-23 NOTE — TELEPHONE ENCOUNTER
[facility]    Who is the name of the provider?:         What is the location you see this provider at?:      Reason for call:  Spoke with patient and he said that 71 pages were faxed to us yesterday 8/23/22 @ 4:30pm per [place].      Can we leave a detailed message on this number?  YES

## 2022-08-23 NOTE — TELEPHONE ENCOUNTER
Records are viewable in Care Everywhere.    6/22/22 RLE arterial US.    Patient needs MIYA/TBI and follow up with Dr. Champion.    Routing to scheduling to contact patient to coordinate above.      Appt note:  Hx of LLE angioplasty on 8/5/21; recently seen at [facility] for nonhealing right foot wound with subsequent right below knee amputation on 6/27/22; pt wishing to re-establish with Dr. Champion.

## 2022-08-24 LAB
ANION GAP SERPL CALCULATED.3IONS-SCNC: 13 MMOL/L (ref 7–15)
BUN SERPL-MCNC: 29.4 MG/DL (ref 8–23)
CALCIUM SERPL-MCNC: 9.8 MG/DL (ref 8.8–10.2)
CHLORIDE SERPL-SCNC: 100 MMOL/L (ref 98–107)
CREAT SERPL-MCNC: 0.97 MG/DL (ref 0.67–1.17)
DEPRECATED HCO3 PLAS-SCNC: 24 MMOL/L (ref 22–29)
GFR SERPL CREATININE-BSD FRML MDRD: 83 ML/MIN/1.73M2
GLUCOSE SERPL-MCNC: 116 MG/DL (ref 70–99)
HBA1C MFR BLD: 5.7 %
MAGNESIUM SERPL-MCNC: 1.8 MG/DL (ref 1.7–2.3)
POTASSIUM SERPL-SCNC: 4.1 MMOL/L (ref 3.4–5.3)
SODIUM SERPL-SCNC: 137 MMOL/L (ref 136–145)

## 2022-08-24 PROCEDURE — 83036 HEMOGLOBIN GLYCOSYLATED A1C: CPT | Mod: ORL | Performed by: NURSE PRACTITIONER

## 2022-08-24 PROCEDURE — 83735 ASSAY OF MAGNESIUM: CPT | Mod: ORL | Performed by: NURSE PRACTITIONER

## 2022-08-24 PROCEDURE — 80048 BASIC METABOLIC PNL TOTAL CA: CPT | Mod: ORL | Performed by: NURSE PRACTITIONER

## 2022-08-24 PROCEDURE — P9604 ONE-WAY ALLOW PRORATED TRIP: HCPCS | Mod: ORL | Performed by: NURSE PRACTITIONER

## 2022-08-24 PROCEDURE — 36415 COLL VENOUS BLD VENIPUNCTURE: CPT | Mod: ORL | Performed by: NURSE PRACTITIONER

## 2022-08-29 ENCOUNTER — HOSPITAL ENCOUNTER (OUTPATIENT)
Dept: ULTRASOUND IMAGING | Facility: CLINIC | Age: 72
Discharge: HOME | End: 2022-08-29
Attending: SURGERY | Admitting: SURGERY
Payer: MEDICARE

## 2022-08-29 DIAGNOSIS — I73.9 PAD (PERIPHERAL ARTERY DISEASE) (H): ICD-10-CM

## 2022-08-29 PROCEDURE — 93922 UPR/L XTREMITY ART 2 LEVELS: CPT

## 2022-08-29 NOTE — TELEPHONE ENCOUNTER
Kusum from [facility] called, pt is there for MIYA/TBI and pt adament he does not need imaging on left leg, he needs it on right bka to be approved for starting prosthetic.   Pt declining left leg imaging.   Pt had been scheduled with Dr. Champion, however right BKA was done in [place].     Discussed with Dr. Champion, pt will need to contact provider in [place] who did his BKA for prosthetics referral and care.     Explained this via phone to pt and Kusum, pt was upset and discontinued call.     Kusum then called back, pts daughter inquiring about left leg MIYA/TBI, notes pt gets confused at times. I explained Dr. Champion can see pt for re-establishing follow up on left leg angiogram on 8/6/21 by Dr. Champion with MIYA/TBI on left leg prior. Kusum will work with pt to obtain left leg MIYA/TBI. They note understanding.     ELIAS Lopez, RN  [facility]  Office:  [phone] Fax: [phone]

## 2022-09-08 ENCOUNTER — OFFICE VISIT (OUTPATIENT)
Dept: SURGERY | Facility: CLINIC | Age: 72
End: 2022-09-08
Payer: MEDICARE

## 2022-09-08 VITALS
SYSTOLIC BLOOD PRESSURE: 118 MMHG | BODY MASS INDEX: 32.07 KG/M2 | WEIGHT: 224 LBS | DIASTOLIC BLOOD PRESSURE: 68 MMHG | OXYGEN SATURATION: 98 % | HEART RATE: 87 BPM | HEIGHT: 70 IN | RESPIRATION RATE: 16 BRPM

## 2022-09-08 DIAGNOSIS — I70.229 CRITICAL ISCHEMIA OF EXTREMITY WITH HISTORY OF REVASCULARIZATION OF SAME EXTREMITY (H): Primary | ICD-10-CM

## 2022-09-08 DIAGNOSIS — Z98.890 CRITICAL ISCHEMIA OF EXTREMITY WITH HISTORY OF REVASCULARIZATION OF SAME EXTREMITY (H): Primary | ICD-10-CM

## 2022-09-08 PROCEDURE — 99213 OFFICE O/P EST LOW 20 MIN: CPT | Performed by: SURGERY

## 2022-09-08 NOTE — PROGRESS NOTES
Mr. Rangel Yang is a 72-year-old male who I had seen for a nonhealing wound in the left foot in August 2021.  We performed a left lower extremity arteriogram.  There was occlusion of the distal left superficial femoral artery which was balloon angioplastied.  We also balloon angioplasty of his left anterior tibial artery.  Peroneal and posterior tibial arteries were occluded.    He subsequently went on to heal that wound in the left lower extremity.    This spring and summer he was in [place] when he developed a wound in his right fourth.  From his description it sounds like a headache effort was made to salvage the right lower extremity with multiple wound debridements, wound care as well as a right lower extremity arteriogram and angioplasty.  However, unfortunately the wound in his right lower extremity progressed and he ended up with a right below the knee amputation.  That is healing well and he is presently back in [place] upon the insistence of his daughter.    His left noninvasive studies show a dorsalis pedis ankle-brachial index of 0.93 and posterior tibial ankle-brachial index of 0.79.  Left great toe pressure is 105 mmHg with a left digital brachial index of 0.60.    I explained to him that on noninvasive studies it does appear that our previous arterial intervention is patent.  However, should he develop another wound or an ulcer or any discoloration or infection in his left foot then he should immediately report to us as that would be a sign of progressive arterial ischemia which would warrant a left lower extremity arteriogram and intervention for limb salvage.    At present when there are no wounds in the left lower extremity I don't feel the need for an arteriogram in his left lower extremity.     I have also advised him to be under regular surveillance for preventive foot care with a podiatrist.  I advised him the name of Dr. Estephania Hernadez for the same.     I have asked him to come back and  see me in Mid October when he is waking on his right BKA prosthesis.

## 2022-09-12 ENCOUNTER — TELEPHONE (OUTPATIENT)
Dept: OTHER | Facility: CLINIC | Age: 72
End: 2022-09-12

## 2022-09-12 NOTE — TELEPHONE ENCOUNTER
LOV 9/8/22 with Dr. Champion.     Routing to  to coordinate f/u mid October 2022 OV only in clinic with Dr. Champion.     Appt note: f/u to 9/8/22, s/p right BKA, eval pt with prosthesis.     DARIELA LopezN, RN  [facility]  Office:  [phone] Fax: [phone]

## 2022-09-13 NOTE — TELEPHONE ENCOUNTER
Per Dr. Champion, pt to remain on 81mg Aspirin daily and discontinue Plavix.    Returned call to patient and discussed the above.  Patient verbalized understanding.      Jessica Baker, DARIELAN, RN-[facility] Vascular [facility]

## 2022-09-13 NOTE — TELEPHONE ENCOUNTER
LM to schedule.         LOV 9/8/22 with Dr. Champion.      Routing to  to coordinate f/u mid October 2022 OV only in clinic with Dr. Champion.      Appt note: f/u to 9/8/22, s/p right BKA, celeste pt with prosthesis.       Fatimah Dill, Surgery Scheduling   [facility]  Vascular [facility]

## 2022-09-13 NOTE — TELEPHONE ENCOUNTER
"Routing to Dr. Champion to verify if pt to stay on Plavix.     S/p balloon angioplasty of his left anterior tibial artery. Non healing wound in left foot.   Hx of right BKA.     Kevin Paula MD - 06/29/2022 noted:    \"PLAVIX  Take 1 tablet by mouth daily for Peripheral Arterial Thromboembolism Prevention.\"    ELIAS Lopez, RN  [facility]  Office:  [phone] Fax: [phone]            "

## 2022-09-13 NOTE — TELEPHONE ENCOUNTER
Who is the name of the provider?:  Akira       What is the location you see this provider at?: [place]        Reason for call:  Pt is wondering if he should till be taking clopidogrel .     Pt just wants a yes or no answer.         Contact name: Rangel       Contact number: [phone]    Vm: yes

## 2022-09-16 NOTE — TELEPHONE ENCOUNTER
Second attempt to reach patient.  LVM requesting a call back and that this was our final attempt to reach out to patient.  Call back number provided.    Jessica Baker, DARIELAN, RN-[facility] Vascular [facility]

## 2022-09-30 ENCOUNTER — TELEPHONE (OUTPATIENT)
Dept: OTHER | Facility: CLINIC | Age: 72
End: 2022-09-30

## 2022-09-30 NOTE — TELEPHONE ENCOUNTER
[facility] VASCULAR HEALTH CENTER    Who is the name of the provider?:  Akira    What is the location you see this provider at/preferred location?: [place]  Person calling: Rachel COCHRAN) Alejandro (group that follows at [facility])  Phone number:  [phone]  Nurse call back needed:  YES     Reason for call:   Please call Rachel.  Questions regarding medications:  Need clarification on Plavix and Aspirin.    The patient told the Practitioner yesterday that Dr. Champion told him he could stop the Plavix and just continue with Aspirin.  They do not have an order for this.    They would like Dr. Champion's recommendation.    Pharmacy location:     Outside Imaging: Not Applicable   Can we leave a detailed message on this number?  YES

## 2022-09-30 NOTE — TELEPHONE ENCOUNTER
Per 9/13/22 encounter, pt to remain on 81mg Aspirin daily and discontinue Plavix.    Returned call to RUTHIE Ramos and provided above update.  Noted understanding and had no further questions.    DARIELA ZabalaN, RN-[facility] Vascular [facility]

## 2022-10-05 NOTE — TELEPHONE ENCOUNTER
Pt scheduled as follow:  Future Appointments   Date Time Provider Department Center   10/27/2022  1:30 PM [facility]

## 2022-11-29 ENCOUNTER — LAB REQUISITION (OUTPATIENT)
Dept: LAB | Facility: CLINIC | Age: 72
End: 2022-11-29
Payer: MEDICARE

## 2022-11-29 DIAGNOSIS — D50.9 IRON DEFICIENCY ANEMIA, UNSPECIFIED: ICD-10-CM

## 2022-11-29 DIAGNOSIS — E11.51 TYPE 2 DIABETES MELLITUS WITH DIABETIC PERIPHERAL ANGIOPATHY WITHOUT GANGRENE (H): ICD-10-CM

## 2022-11-30 LAB
BASOPHILS # BLD AUTO: 0.1 10E3/UL (ref 0–0.2)
BASOPHILS NFR BLD AUTO: 1 %
EOSINOPHIL # BLD AUTO: 0.5 10E3/UL (ref 0–0.7)
EOSINOPHIL NFR BLD AUTO: 6 %
ERYTHROCYTE [DISTWIDTH] IN BLOOD BY AUTOMATED COUNT: 13.6 % (ref 10–15)
HBA1C MFR BLD: 7.6 %
HCT VFR BLD AUTO: 46.4 % (ref 40–53)
HGB BLD-MCNC: 15.5 G/DL (ref 13.3–17.7)
IMM GRANULOCYTES # BLD: 0 10E3/UL
IMM GRANULOCYTES NFR BLD: 0 %
LYMPHOCYTES # BLD AUTO: 3.2 10E3/UL (ref 0.8–5.3)
LYMPHOCYTES NFR BLD AUTO: 36 %
MCH RBC QN AUTO: 29.5 PG (ref 26.5–33)
MCHC RBC AUTO-ENTMCNC: 33.4 G/DL (ref 31.5–36.5)
MCV RBC AUTO: 88 FL (ref 78–100)
MONOCYTES # BLD AUTO: 0.6 10E3/UL (ref 0–1.3)
MONOCYTES NFR BLD AUTO: 7 %
NEUTROPHILS # BLD AUTO: 4.6 10E3/UL (ref 1.6–8.3)
NEUTROPHILS NFR BLD AUTO: 50 %
NRBC # BLD AUTO: 0 10E3/UL
NRBC BLD AUTO-RTO: 0 /100
PLATELET # BLD AUTO: 222 10E3/UL (ref 150–450)
RBC # BLD AUTO: 5.26 10E6/UL (ref 4.4–5.9)
WBC # BLD AUTO: 9.1 10E3/UL (ref 4–11)

## 2022-11-30 PROCEDURE — 85025 COMPLETE CBC W/AUTO DIFF WBC: CPT | Mod: ORL | Performed by: NURSE PRACTITIONER

## 2022-11-30 PROCEDURE — 36415 COLL VENOUS BLD VENIPUNCTURE: CPT | Mod: ORL | Performed by: NURSE PRACTITIONER

## 2022-11-30 PROCEDURE — 83036 HEMOGLOBIN GLYCOSYLATED A1C: CPT | Mod: ORL | Performed by: NURSE PRACTITIONER

## 2022-11-30 PROCEDURE — P9604 ONE-WAY ALLOW PRORATED TRIP: HCPCS | Mod: ORL | Performed by: NURSE PRACTITIONER

## 2022-12-26 ENCOUNTER — LAB REQUISITION (OUTPATIENT)
Dept: LAB | Facility: CLINIC | Age: 72
End: 2022-12-26
Payer: MEDICARE

## 2022-12-26 DIAGNOSIS — E11.51 TYPE 2 DIABETES MELLITUS WITH DIABETIC PERIPHERAL ANGIOPATHY WITHOUT GANGRENE (H): ICD-10-CM

## 2023-01-01 NOTE — PROGRESS NOTES
"[facility]  Internal Medicine  Progress Note    Date of Service: 8/4/2021    Patient: Rangel Yang  MRN: [id number]  Admission Date: 8/3/2021  Hospital Day # 2    Assessment & Plan: Rangel Yang is a 71 year old male with a history of DM Type 2, Peripheral Neuropathy, Previous Left 5th toe amputation who presented with LLE erythema, swelling and wound.    LLE Wound and Cellulitis - pt was doing some remodeling work last week when he scraped his left anterior lower extremity and left ankle on the aluminum ladder.  He started to develop increased erythema and opening of the left ankle wound 2-3 days prior to admission.  Xray on admission with bimalleolar soft tissue swelling with no convincing evidence of osteomyelitis.  - continue Vancomycin and Zosyn  - Infectious Disease consult  - Podiatry consulted; MIYA and MRI ordered  - analgesics prn    DM Type 2 - hgb A1C 7.6.  -237.  - hold pta Metformin  - start ISS protocol    CODE: full  DVT: SCD  Diet/fluids: regular diet, NS    Viv PARKINSONC  Hospitalist Physician Assistant  [facility]  Pager: [phone]      Subjective & Interval Hx:    Patient reports pain is controlled.  Denies chest pain, shortness of breath, abdominal pain.  Hx of DM with toe amputation in the past.      Last 24 hr care team notes reviewed.   ROS:  4 point ROS including Respiratory, CV, GI and , other than that noted in the HPI, is negative.    Physical Exam:    Blood pressure (!) 141/69, pulse 71, temperature 97.9  F (36.6  C), temperature source Oral, resp. rate 18, height 1.778 m (5' 10\"), weight 100.3 kg (221 lb 1.6 oz), SpO2 99 %.  General: Alert, interactive, NAD  HEENT: AT/NC  Resp: clear to auscultation bilaterally, no crackles or wheezes  Cardiac: regular rate and rhythm, no murmur  Abdomen: Soft, nontender, nondistended. +BS  Extremities: LLE anterior shin to the foot with erythema no exceeding outlined borders.  Left lateral " malleolus with open ulceration with purulent drainage and foul smell.  Left lateral foot with chronic appearing dry ulceration which does not appear infected.  Left posterior calf with a separate erythematous area of abrasion which does not appear cellulitic.  Neuro: Alert & oriented x 3, Cns 2-12 intact, moves all extremities equally    Labs & Images:  Reviewed in Epic   Medications:    Current Facility-Administered Medications   Medication     acetaminophen (TYLENOL) tablet 650 mg    Or     acetaminophen (TYLENOL) Suppository 650 mg     glucose gel 15-30 g    Or     dextrose 50 % injection 25-50 mL    Or     glucagon injection 1 mg     glucose gel 15-30 g    Or     dextrose 50 % injection 25-50 mL    Or     glucagon injection 1 mg     HYDROmorphone (DILAUDID) injection 0.2 mg     insulin aspart (NovoLOG) injection (RAPID ACTING)     insulin aspart (NovoLOG) injection (RAPID ACTING)     lidocaine (LMX4) cream     lidocaine 1 % 0.1-1 mL     melatonin tablet 1 mg     naloxone (NARCAN) injection 0.2 mg    Or     naloxone (NARCAN) injection 0.4 mg    Or     naloxone (NARCAN) injection 0.2 mg    Or     naloxone (NARCAN) injection 0.4 mg     oxyCODONE (ROXICODONE) tablet 5 mg     piperacillin-tazobactam (ZOSYN) intermittent infusion 4.5 g     sodium chloride (PF) 0.9% PF flush 3 mL     sodium chloride (PF) 0.9% PF flush 3 mL     sodium chloride 0.9% infusion     vancomycin (VANCOCIN) 1,750 mg in sodium chloride 0.9 % 500 mL intermittent infusion        Hoda

## 2023-02-07 ENCOUNTER — LAB REQUISITION (OUTPATIENT)
Dept: LAB | Facility: CLINIC | Age: 73
End: 2023-02-07
Payer: MEDICARE

## 2023-02-07 DIAGNOSIS — E11.36 TYPE 2 DIABETES MELLITUS WITH DIABETIC CATARACT (H): ICD-10-CM

## 2023-02-08 LAB — HBA1C MFR BLD: 8.2 %

## 2023-02-08 PROCEDURE — 83036 HEMOGLOBIN GLYCOSYLATED A1C: CPT | Mod: ORL | Performed by: NURSE PRACTITIONER

## 2023-02-08 PROCEDURE — 36415 COLL VENOUS BLD VENIPUNCTURE: CPT | Mod: ORL | Performed by: NURSE PRACTITIONER

## 2023-02-08 PROCEDURE — P9604 ONE-WAY ALLOW PRORATED TRIP: HCPCS | Mod: ORL | Performed by: NURSE PRACTITIONER

## 2023-05-09 ENCOUNTER — LAB REQUISITION (OUTPATIENT)
Dept: LAB | Facility: CLINIC | Age: 73
End: 2023-05-09
Payer: MEDICARE

## 2023-05-09 DIAGNOSIS — E11.36 TYPE 2 DIABETES MELLITUS WITH DIABETIC CATARACT (H): ICD-10-CM

## 2023-05-17 LAB
ANION GAP SERPL CALCULATED.3IONS-SCNC: 18 MMOL/L (ref 7–15)
BUN SERPL-MCNC: 20.4 MG/DL (ref 8–23)
CALCIUM SERPL-MCNC: 9.7 MG/DL (ref 8.8–10.2)
CHLORIDE SERPL-SCNC: 102 MMOL/L (ref 98–107)
CREAT SERPL-MCNC: 1 MG/DL (ref 0.67–1.17)
DEPRECATED HCO3 PLAS-SCNC: 18 MMOL/L (ref 22–29)
GFR SERPL CREATININE-BSD FRML MDRD: 80 ML/MIN/1.73M2
GLUCOSE SERPL-MCNC: 163 MG/DL (ref 70–99)
HBA1C MFR BLD: 8 %
HGB BLD-MCNC: 16.3 G/DL (ref 13.3–17.7)
POTASSIUM SERPL-SCNC: 5.3 MMOL/L (ref 3.4–5.3)
SODIUM SERPL-SCNC: 138 MMOL/L (ref 136–145)

## 2023-05-17 PROCEDURE — 80048 BASIC METABOLIC PNL TOTAL CA: CPT | Mod: ORL | Performed by: NURSE PRACTITIONER

## 2023-05-17 PROCEDURE — P9604 ONE-WAY ALLOW PRORATED TRIP: HCPCS | Mod: ORL | Performed by: NURSE PRACTITIONER

## 2023-05-17 PROCEDURE — 85018 HEMOGLOBIN: CPT | Mod: ORL | Performed by: NURSE PRACTITIONER

## 2023-05-17 PROCEDURE — 36415 COLL VENOUS BLD VENIPUNCTURE: CPT | Mod: ORL | Performed by: NURSE PRACTITIONER

## 2023-05-17 PROCEDURE — 83036 HEMOGLOBIN GLYCOSYLATED A1C: CPT | Mod: ORL | Performed by: NURSE PRACTITIONER

## 2023-07-07 ENCOUNTER — TELEPHONE (OUTPATIENT)
Dept: OTHER | Facility: CLINIC | Age: 73
End: 2023-07-07
Payer: MEDICARE

## 2023-07-07 NOTE — TELEPHONE ENCOUNTER
[facility] VASCULAR [facility] CENTER    Who is the name of the provider?:  Self Referral    What is the location you see this provider at/preferred location?: [place]  Person calling / Facility: Rangel Yang  Phone number:  [phone]  Nurse call back needed:  NO    Reason for call:  Seen by Dr Champion at [facility]. Patient called with regards to a recent wound caused by broken glass. States that he may still have glass in wound. Describes history of diabetes and states that he will try to call I and go to urgent care.    Pharmacy location:  n/a  Outside Imaging: n/a   Can we leave a detailed message on this number?  YES

## 2023-07-07 NOTE — TELEPHONE ENCOUNTER
Consult received via Phone from Patient for wound of the Right Lower leg.    Please schedule with Oniel Hill or PETE Gaxiola CNP at [facility] for next available appointment.    **For all providers, Ramandeep Padgett PA-C, Dr. Hernadez, Alma Gaxiola NP or Dr. Hutton, please schedule a follow up 2-3 weeks after initial appointment.**    Is the patient able to make their own medical decisions? Yes    Is patient a RIP lift? No  Routing to  Wound Healing Scheduling.

## 2023-07-08 ENCOUNTER — TRANSFERRED RECORDS (OUTPATIENT)
Dept: HEALTH INFORMATION MANAGEMENT | Facility: CLINIC | Age: 73
End: 2023-07-08
Payer: MEDICARE

## 2023-07-10 ENCOUNTER — OFFICE VISIT (OUTPATIENT)
Dept: URGENT CARE | Facility: URGENT CARE | Age: 73
End: 2023-07-10
Payer: MEDICARE

## 2023-07-10 ENCOUNTER — HOSPITAL ENCOUNTER (EMERGENCY)
Facility: CLINIC | Age: 73
Discharge: HOME | End: 2023-07-10
Attending: EMERGENCY MEDICINE | Admitting: EMERGENCY MEDICINE
Payer: MEDICARE

## 2023-07-10 ENCOUNTER — TELEPHONE (OUTPATIENT)
Dept: ORTHOPEDICS | Facility: CLINIC | Age: 73
End: 2023-07-10

## 2023-07-10 ENCOUNTER — NURSE TRIAGE (OUTPATIENT)
Dept: GERIATRICS | Facility: CLINIC | Age: 73
End: 2023-07-10
Payer: MEDICARE

## 2023-07-10 VITALS
DIASTOLIC BLOOD PRESSURE: 86 MMHG | OXYGEN SATURATION: 97 % | BODY MASS INDEX: 33 KG/M2 | WEIGHT: 230 LBS | HEART RATE: 86 BPM | SYSTOLIC BLOOD PRESSURE: 158 MMHG

## 2023-07-10 VITALS
TEMPERATURE: 98.3 F | HEART RATE: 99 BPM | OXYGEN SATURATION: 95 % | RESPIRATION RATE: 18 BRPM | DIASTOLIC BLOOD PRESSURE: 84 MMHG | SYSTOLIC BLOOD PRESSURE: 140 MMHG

## 2023-07-10 DIAGNOSIS — Z51.89 VISIT FOR WOUND CHECK: ICD-10-CM

## 2023-07-10 DIAGNOSIS — M79.5 FOREIGN BODY (FB) IN SOFT TISSUE: Primary | ICD-10-CM

## 2023-07-10 DIAGNOSIS — S81.801A OPEN WOUND OF RIGHT LOWER EXTREMITY, INITIAL ENCOUNTER: ICD-10-CM

## 2023-07-10 PROCEDURE — 99213 OFFICE O/P EST LOW 20 MIN: CPT | Performed by: PHYSICIAN ASSISTANT

## 2023-07-10 PROCEDURE — 99284 EMERGENCY DEPT VISIT MOD MDM: CPT | Mod: 25

## 2023-07-10 PROCEDURE — 76882 US LMTD JT/FCL EVL NVASC XTR: CPT | Mod: RT

## 2023-07-10 RX ORDER — MUPIROCIN 20 MG/G
OINTMENT TOPICAL 2 TIMES DAILY
Qty: 15 G | Refills: 0 | Status: SHIPPED | OUTPATIENT
Start: 2023-07-10 | End: 2023-07-20

## 2023-07-10 RX ORDER — DOXYCYCLINE 100 MG/1
TABLET ORAL
COMMUNITY
Start: 2023-07-06 | End: 2023-08-17

## 2023-07-10 ASSESSMENT — ENCOUNTER SYMPTOMS
WOUND: 1
CHILLS: 0
FEVER: 0

## 2023-07-10 ASSESSMENT — ACTIVITIES OF DAILY LIVING (ADL): ADLS_ACUITY_SCORE: 33

## 2023-07-10 NOTE — TELEPHONE ENCOUNTER
Patient was transferred from [facility].   Patient is being referred by Urgent Care for urgent referral for glass embedded below his knee.     Urgent Care note has not been completed.   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 Surgery was done in [place] in July of 2022.   He fell out of his wheelchair and landed on the garage floor with his right leg/knee about one month ago. They think he got the glass embedded at that time.   His knee wound had been healing well up until approximately 4 days ago when it became more reddened. Patient lives in Assisted Living. The NP there ordered a portable xray from [facility] and found the glass present. He is currently on Doxycycline.     When he called into triage, he was told to go to [facility]. He states they were not able to remove it and referred him to our office. The portable xray company supposedly faxed over the xray results to [place]. They can be reached by phone at: [phone].     Patient is considered about infection because he is diabetic.     Will check into further of who an appropriate provider would be and get back with him. Patient asked if he would be seen today and was told that was unlikely.   He verbalized understanding.     Ok to leave message : Yes    Per chart review, it appears that either patient or Assisted Living staff contacted Dr. Champion's office on 7/7/23 and was told to contact [facility]. He has an appointment with them scheduled for 8/8/23.  Patient was also instructed to go to Urgent Care.     EROS Faith RN

## 2023-07-10 NOTE — TELEPHONE ENCOUNTER
Urgent Care note is now complete.   There is an addendum stating that patient was not able to get into orthopedics today and that he was instructed to go to [facility] ED.     Closing encounter.     EROS Faith RN

## 2023-07-10 NOTE — PROGRESS NOTES
Assessment & Plan     Foreign body (FB) in soft tissue    - The glass is not visible on exam  -Emergent referral completed to Orthopedics  - Orthopedic  Referral     Addendum  Patient cannot be seen by orthopedics today.  I have referred patient to the emergency room.  [facility] and provided information about patient. Patient was called and advised to go to the ED    Open wound of right lower extremity, initial encounter  -Wound is healing, granulation tissue present. No cellulitis or purulence on wound       Patient Instructions   I have referred you to Orthopedics for removal of the foreign body      Return for Follow up, with PCP.    At the end of the encounter, I discussed results, diagnosis, medications. Discussed red flags for immediate return to clinic/ER, as well as indications for follow up if no improvement. Patient understood and agreed to plan. Patient was stable for discharge.    Dino Multani is a 73 year old male who presents to clinic today for the following health issues:  Chief Complaint   Patient presents with     Foreign Object     Start a month sx right knee- embedded piece of glass, partially healed xray tx similar to bacitracin, cleaning 2x/day, non adhesive dressing and tape,      HPI   Patient reports glass embedded on the right lower leg x one month ago. He is amputated on the right leg, below the knee. He notes falling onto glass, having a wound and the glass penetrating into the wound. He was able to remove some of the glass. He was started on doxycycline by his PCP. X ray of the wound was done on 07/08/2023 which showed a piece of foreign body in the soft tissue. Patient is here because he wants the piece of glass removed. He called the wound clinic and was directed to come here.  Patient has a history of type 2 diabetes. Denies fever and chills       Review of Systems   Constitutional: Negative for chills and fever.   Skin: Positive for wound.        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    resting bedside ABIs of 0.9 bilaterally; ocurred in association with  infection after agressive toenail cutting which the pt did himself     Hyperlipidemia LDL goal < 70 4-2010    tg > 700 at dx     Peripheral neuropathy     secondary to previously unrecognized DM2     Type II or unspecified type diabetes mellitus without mention of complication, uncontrolled 4-2010    very angry about his dx       Social History     Tobacco Use     Smoking status: Former     Smokeless tobacco: Never   Substance Use Topics     Alcohol use: Yes     Comment: occ           Objective    BP (!) 158/86   Pulse 86   Wt 104.3 kg (230 lb)   SpO2 97%   BMI 33.00 kg/m    Physical Exam  Constitutional:       Appearance: Normal appearance.   HENT:      Head: Normocephalic.   Cardiovascular:      Rate and Rhythm: Normal rate and regular rhythm.   Pulmonary:      Effort: Pulmonary effort is normal.      Breath sounds: Normal breath sounds.   Musculoskeletal:      Cervical back: Normal range of motion and neck supple.        Legs:       Comments: Superficial wound on the right leg stump. Granulation tissue present. Wound is healing.       Right Lower Extremity: Right leg is amputated below knee.   Lymphadenopathy:      Head:      Right side of head: No submental, submandibular or tonsillar adenopathy.      Left side of head: No submental, submandibular or tonsillar adenopathy.   Skin:     General: Skin is warm and dry.      Findings: Wound  present.   Neurological:      Mental Status: He is alert and oriented to person, place, and time.           Media Information      Document Information    Other:  Photograph      07/10/2023 1:24 PM   Attached To:   Office Visit on 7/10/23 with Ruth Husain PA-C     Source Information    Ruth Husain PA-C   Urgent Care           Ruth Husain PA-C

## 2023-07-10 NOTE — ED TRIAGE NOTES
Patient reports glass embedded below knee on right leg.  He reports it happened about 3 weeks ago.  In the last week the wound has become more irritated and red.  He reports he is currently taking doxycyline (for about 5 days).  ABCs intact, A&Ox4.     Triage Assessment     Row Name 07/10/23 1527       Triage Assessment (Adult)    Airway WDL WDL       Respiratory WDL    Respiratory WDL WDL       Skin Circulation/Temperature WDL    Skin Circulation/Temperature WDL X  glass embedded below knee       Cardiac WDL    Cardiac WDL WDL       Peripheral/Neurovascular WDL    Peripheral Neurovascular WDL WDL       Cognitive/Neuro/Behavioral WDL    Cognitive/Neuro/Behavioral WDL WDL

## 2023-07-10 NOTE — TELEPHONE ENCOUNTER
S-(situation): Patient calling inquiring if able to be seen at .     B-(background): Patient states he has a wound in his knee and he got a piece of glass stuck in his knee over the weekend. Patient states he had an x-ray completed over the weekend and it showed some glass in the wound.     A-(assessment): Patient feels that glass is still stuck in wound. Patient does not want triage, just wants to know if he can be seen in .     R-(recommendations): RN advised we would now know what we can do for his wound until it is evaluated. RN advised patient to be seen in  for evaluation. Patient agreed with plan.         Reason for Disposition    Sensation of something still in the wound    Additional Information    Negative: Tip of the object is broken off and missing    Negative: Puncture wound from sharp object that was very dirty (e.g., barnyard)    Negative: Puncture wound of finger and entire finger swollen    Negative: Puncture wound of foot and puncture went through shoe (e.g., tennis shoe)    Negative: Puncture wound of bare foot (no shoes) and setting was dirty    Negative: Puncture wound of foot and hurts too much to walk on (i.e., unable to bear weight, severe limp)    Negative: SEVERE pain (e.g., excruciating)    Negative: Dirt in the wound and not removed with 15 minutes of scrubbing    Negative: Sounds like a serious injury to the triager    Negative: Puncture wound of head, neck, chest, abdomen, or overlying a joint and it could be deep    Negative: Needlestick from used or discarded injection needle  (Exception: Clean, unused needle.)    Negative: High pressure injection injury (e.g., from grease gun or paint gun, usually work-related)    Negative: Caused by an animal bite    Negative: Skin is cut or scraped, not punctured    Negative: Puncture wound of eye or eyelid    Negative: Foreign body is still in the skin (e.g., splinter, sliver, fishhook)    Negative: Shock suspected (e.g., cold/pale/clammy  skin, too weak to stand, low BP, rapid pulse)    Negative: Puncture wound of head, neck, chest, back, or abdomen that sounds life-threatening to triager    Negative: Sounds like a life-threatening emergency to the triager    Protocols used: PUNCTURE WOUND-A-OH    Russel ISAACS RN 7/10/2023 at 10:18 AM

## 2023-07-10 NOTE — DISCHARGE INSTRUCTIONS
Return to ER immediately if you develop: new redness or foul smelling drainage from wound, Fever > 101, persistent nausea or vomiting OR you have any other concerns about your health.

## 2023-07-10 NOTE — ED PROVIDER NOTES
History     Chief Complaint:  Wound Check     The history is provided by the patient.      Rangel Yang is a 73 year old male with history of type 2 diabetes, hypertension, peripheral artery disease, and hyperlipidemia who presents to the ED via car for evaluation of a wound check. Patient reports that 3 weeks ago he had an incident where he got glass in his right knee. He thinks he got some of it out but he doesn't think he got all of it out. 4 days ago he states that he went to an orthopedic Urgent Care where they took X-rays and still saw glass in the area, so they sent him here for further evaluation. Patient says he still feels the glass in there and is concerned that the wound hasn't been healing despite it being 3 weeks since the injury. He denies that anything has been preventing the wound from healing or any pain to the area. It is of note that patient has an amputated right lower leg and has a prosthetic, but has not been wearing this since the injury.     Independent Historian:   None - Patient Only    Review of External Notes:                Medications:    Aspirin 81 mg   Doxycycline   Metformin  Lipoic acid  Lipitor   CO Q-10  Glucotrol   Zestril   Plavix   Vancocin    Past Medical History:    ATN (acute tubular necrosis)   Diabetic gangrene  Hyperlipidemia  Peripheral neuropathy   Type II diabetes mellitus  Osteomyelitis  Chronic ulcer of left ankle   Arthritis   Cancer  Cataract  PAD  Obesity   Leukocytosis  Hyponatremia  Kidney injury   Hypertension     Past Surgical History:    Biopsy bone foot  IR lower extremity angiogram left   Tonsillextomy and Adenoidectomy   Open left 5th toe amputation   Varicose vein surgery of the left lower extremity  Bilateral cataract surgery   Fracture surgery  Right lower leg amputation through tibia/fibula     Physical Exam     Patient Vitals for the past 24 hrs:   BP Temp Temp src Pulse Resp SpO2   07/10/23 1706 -- 98.3  F (36.8  C) Oral -- -- --   07/10/23  1526 (!) 140/84 (!) 96.6  F (35.9  C) Temporal 99 18 95 %      Physical Exam  Extremity: Right lower extremity anterior distal to the knee on his stump there are 2 wounds the medial 1 about 5 mm in diameter the lateral wound about 3 mm in diameter.  No significant surrounding erythema.  No purulent or foul-smelling drainage.  No palpable or visualized foreign body.  CV: ppi, regular   Resp: speaking in full sentences without any resp distress  Skin: warm dry well perfused  Neuro: Alert, no gross motor or sensory deficits,  gait stable    Emergency Department Course     Imaging:  POC US SOFT TISSUE   Final Result   Limited bedside ultrasound to evaluate for foreign body underneath the wound.      Probe: High-frequency linear array      Windows: Transverse and longitudinal views of the wound.      Findings impression: No foreign body present.  No underlying anechoic or hypoechoic fluid collection.         Report per radiology    Emergency Department Course & Assessments:     Interventions:  Medications - No data to display     Independent Interpretation (X-rays, CTs, rhythm strip):  None    Assessments/Consultations/Discussion of Management or Tests:   ED Course as of 23 0100   Mon Jul 10, 2023   1606 I obtained history and examined the patient as noted above.    1654 I rechecked the patient and explained findings.      Social Determinants of Health affecting care:   None    Disposition:  The patient was discharged to home.     Impression & Plan    CMS Diagnoses: None    Medical Decision Makin-year-old who is here for a wound check.  Right lower extremity below-knee amputation to wound anteriorly.  States that he recently pulled out a shard of glass is concerned there is a residual foreign body.  Had an x-ray done a few days ago where there was question of foreign body however seeing the image and seen the report they describe this area as posterior his wounds are anterior and there is nothing to suggest  glass foreign body in the area of his wound.  Looked at this with ultrasound as well there is no underlying foreign body.  Safe and stable for discharge home.  Given Mepilex to help limit pressure and friction on the wound so they can heal especially when he is wearing his prosthetic.     Diagnosis:    ICD-10-CM    1. Visit for wound check  Z51.89          Discharge Medications:  Discharge Medication List as of 7/10/2023  5:04 PM      START taking these medications    Details   mupirocin (BACTROBAN) 2 % external ointment Apply topically 2 times daily for 10 daysDisp-15 g, [id number]            Scribe Disclosure:  I, Angela Burgess, am serving as a scribe at 4:55 PM on 7/10/2023 to document services personally performed by Isaiah Aguilera MD based on my observations and the provider's statements to me.     7/10/2023   Isaiah Aguilera MD Walker, Jerome Richard, MD  07/11/23 0100

## 2023-07-24 ENCOUNTER — TELEPHONE (OUTPATIENT)
Dept: OTHER | Facility: CLINIC | Age: 73
End: 2023-07-24
Payer: MEDICARE

## 2023-07-24 DIAGNOSIS — I73.9 PAD (PERIPHERAL ARTERY DISEASE) (H): Primary | ICD-10-CM

## 2023-07-24 NOTE — TELEPHONE ENCOUNTER
[facility]    Who is the name of the provider?:  Dr Champion    What is the location you see this provider at/preferred location?: [place]  Person calling / Facility: Rangel  Phone number:  [phone]  Nurse call back needed:  if needed     Reason for call:  Patient was calling to schedule follow up appointment w Dr Champion last seen 9/22    Pharmacy location:  na  Outside Imaging: na   Can we leave a detailed message on this number?  yes

## 2023-07-24 NOTE — TELEPHONE ENCOUNTER
"Chart review: LOV 9/8/22, Dr. Champion noted:  \"I have asked him to come back and see me in Mid October when he is waking on his right BKA prosthesis.\"    Pt cancelled 10/27/22 OV appt \"due to illness\" and pt was to call back to rescheduled. No follow up call received.     Routing to  to coordinate.   Pt needs to be scheduled for MIYA/TBI of left lower extremity and in person follow up OV with Dr. Champion at next available.     Appt note: overdue follow up. Pt s/p right below knee amputation 6/27/22 in [place]. Hx of left lower extremity angioplasty 8/6/21. (MIYA/TBI of left LE to be obtained prior).      ELIAS Lopez, RN  [facility]  Office:  [phone] Fax: [phone]      "

## 2023-07-27 NOTE — PLAN OF CARE
A&Ox4. VSS except hypertensive. Denies pain. Pt steady on feet, SBA/ind. NPO for I&D and bone resection today. . IV infusing. Groin site CDI, CMS intact. Redness and swelling to LLE.    ,[email or web address],DirectAddress_Unknown,DirectAddress_Unknown ,[email or web address],DirectAddress_Unknown,DirectAddress_Unknown,DirectAddress_Unknown

## 2023-07-27 NOTE — TELEPHONE ENCOUNTER
Patient is scheduled for his Ultrasound and follow up visit with Dr Champion on  08/17/23 in [place].

## 2023-08-17 ENCOUNTER — HOSPITAL ENCOUNTER (OUTPATIENT)
Dept: ULTRASOUND IMAGING | Facility: CLINIC | Age: 73
Discharge: HOME | End: 2023-08-17
Attending: SURGERY | Admitting: SURGERY
Payer: MEDICARE

## 2023-08-17 ENCOUNTER — OFFICE VISIT (OUTPATIENT)
Dept: SURGERY | Facility: CLINIC | Age: 73
End: 2023-08-17
Attending: SURGERY
Payer: MEDICARE

## 2023-08-17 ENCOUNTER — TELEPHONE (OUTPATIENT)
Dept: CARDIOLOGY | Facility: CLINIC | Age: 73
End: 2023-08-17

## 2023-08-17 VITALS
HEART RATE: 93 BPM | OXYGEN SATURATION: 97 % | WEIGHT: 230 LBS | HEIGHT: 70 IN | DIASTOLIC BLOOD PRESSURE: 88 MMHG | BODY MASS INDEX: 32.93 KG/M2 | SYSTOLIC BLOOD PRESSURE: 146 MMHG

## 2023-08-17 DIAGNOSIS — I70.223 CRITICAL LIMB ISCHEMIA OF BOTH LOWER EXTREMITIES (H): Primary | ICD-10-CM

## 2023-08-17 DIAGNOSIS — I73.9 PAD (PERIPHERAL ARTERY DISEASE) (H): Primary | ICD-10-CM

## 2023-08-17 DIAGNOSIS — I73.9 PAD (PERIPHERAL ARTERY DISEASE) (H): ICD-10-CM

## 2023-08-17 PROCEDURE — 93922 UPR/L XTREMITY ART 2 LEVELS: CPT

## 2023-08-17 PROCEDURE — 99213 OFFICE O/P EST LOW 20 MIN: CPT | Performed by: SURGERY

## 2023-08-18 NOTE — PROGRESS NOTES
Rangel Yang is a 73-year-old male who I had initially seen for a nonhealing wound in the left foot in August 2021 we performed a left lower extremity arteriogram.  There was occlusion of the distal left superficial femoral artery which was balloon angioplastied.  We also balloon angioplastied his left anterior tibial artery.  Peroneal and posterior tibial arteries were occluded.  He subsequently went on to heal that wound in the left lower extremity.    The summer following that he went to [place] where he developed a wound in his right foot.  From his description it sounds like a heroic effort was made to salvage his right foot and lower extremity with multiple wound debridements, wound care as well as right lower extremity arteriogram and angioplasty.  However, pointedly the wound in the right foot and lower extremity progress and he ended up with a right below-knee amputation.  He then started wearing a prosthesis.  He moved back to [place] upon the insistence of his daughter.  He is presently residing in an assisted living.    Recently he fell trying to throw some items into a trash can which had wheels on it which he did not realize.  He actually landed on his butt but he scraped the right below-knee amputation.  His prosthetists team  [facility] have advised him local treatment and this has eventually healed.    No issues with the left lower extremity.  He is quite adamant about moving out of assisted living into independent living which is actually quite close to the location of the assisted living facility.    Noninvasive arterial studies are more or less similar to the one that we had last year.    I have explained to him that it is paramount that he keep his blood sugar under control and be very watchful of any wounds, ulcers or nonhealing injuries in either lower extremity.  We will, of course, continue to keep a close eye on matters and following him longitudinally with left lower extremity  ankle-brachial indices.  Of course if he develops any nonhealing wounds or ulcers or infections in his left foot then I have advised him to report to the emergency department at [facility].

## 2023-08-18 NOTE — TELEPHONE ENCOUNTER
As I was finishing the documentation I was alerted that Mr. Yang is not on any statins.  I called and left a message for him to call back.  If he does call back we will going to put him on Lipitor 40 mg daily.

## 2023-08-18 NOTE — TELEPHONE ENCOUNTER
"Pt called back, writer explained new order for Lipitor and reasoning per Dr. Champion.   Pt adamant he does not want to be on a statin, he has taken one in the past and had diarrhea side effect. Also, pt residing at [facility] right now and the \"food is horrible, making cholesterol high\", pt states once he is home and can cook own meals he will get his cholesterol lowered.   I will update Dr. Champion of this, if any further MD recommendation, I will call pt back with update.   Pt verbalized understanding.     Routing to Dr. Champion.     Update: per Dr. Champion, it is pts autonomy re if he declines to take statin.   Also, follow up in 1 year with MIYA of left lower extremity. Wounds in left foot are now healed.     ELIAS Lopez, RN  [facility]  Office:  [phone] Fax: [phone]    "

## 2023-08-25 NOTE — TELEPHONE ENCOUNTER
Pt called to ask if it was us that sent in Lipitor and he doesn't want to take this drug. Again was very adamant about not wanting it and wondering what other options he could try.    Can he take anything natural to help him but nothing that would make him sick?    Pt is also looking for a new primary care physician and would like some recommendation.

## 2023-08-25 NOTE — TELEPHONE ENCOUNTER
Called patient and offered to mail him dietary information for cholesterol control.  He refuses to take a statin.    He asked for recommendations for primary care and I provided the names of some internal medicine providers in [place].  He will call the clinic to arrange for an appointment to establish care.    Jania Patino RN BSN  [facility]  [phone]         none known

## 2023-09-27 NOTE — TELEPHONE ENCOUNTER
Patient called and he lost his list of names of internal medicine providers that was given to him. Can RN please call him back and give him some names again? Rangel can be reached at [phone].

## 2023-09-27 NOTE — TELEPHONE ENCOUNTER
[facility] [address] [place] [address]    Clinic Main Phone Number     [phone]    Called patient to provide the above contact information.

## 2023-10-06 ENCOUNTER — OFFICE VISIT (OUTPATIENT)
Dept: FAMILY MEDICINE | Facility: CLINIC | Age: 73
End: 2023-10-06
Payer: MEDICARE

## 2023-10-06 VITALS
HEART RATE: 86 BPM | TEMPERATURE: 97.6 F | BODY MASS INDEX: 36.05 KG/M2 | OXYGEN SATURATION: 99 % | SYSTOLIC BLOOD PRESSURE: 162 MMHG | WEIGHT: 243.4 LBS | DIASTOLIC BLOOD PRESSURE: 84 MMHG | HEIGHT: 69 IN

## 2023-10-06 DIAGNOSIS — E11.42 DIABETIC POLYNEUROPATHY ASSOCIATED WITH TYPE 2 DIABETES MELLITUS (H): Primary | ICD-10-CM

## 2023-10-06 LAB
CHOLEST SERPL-MCNC: 220 MG/DL
CREAT UR-MCNC: 135 MG/DL
HBA1C MFR BLD: 8.1 % (ref 0–5.6)
HDLC SERPL-MCNC: 32 MG/DL
LDLC SERPL CALC-MCNC: 138 MG/DL
MICROALBUMIN UR-MCNC: 122 MG/L
MICROALBUMIN/CREAT UR: 90.37 MG/G CR (ref 0–17)
NONHDLC SERPL-MCNC: 188 MG/DL
TRIGL SERPL-MCNC: 251 MG/DL

## 2023-10-06 PROCEDURE — 91320 SARSCV2 VAC 30MCG TRS-SUC IM: CPT | Performed by: PHYSICIAN ASSISTANT

## 2023-10-06 PROCEDURE — 80061 LIPID PANEL: CPT | Performed by: PHYSICIAN ASSISTANT

## 2023-10-06 PROCEDURE — 82570 ASSAY OF URINE CREATININE: CPT | Performed by: PHYSICIAN ASSISTANT

## 2023-10-06 PROCEDURE — 90677 PCV20 VACCINE IM: CPT | Performed by: PHYSICIAN ASSISTANT

## 2023-10-06 PROCEDURE — 99214 OFFICE O/P EST MOD 30 MIN: CPT | Mod: 25 | Performed by: PHYSICIAN ASSISTANT

## 2023-10-06 PROCEDURE — 83036 HEMOGLOBIN GLYCOSYLATED A1C: CPT | Performed by: PHYSICIAN ASSISTANT

## 2023-10-06 PROCEDURE — 90480 ADMN SARSCOV2 VAC 1/ONLY CMP: CPT | Performed by: PHYSICIAN ASSISTANT

## 2023-10-06 PROCEDURE — 99207 PR FOOT EXAM NO CHARGE: CPT | Performed by: PHYSICIAN ASSISTANT

## 2023-10-06 PROCEDURE — G0009 ADMIN PNEUMOCOCCAL VACCINE: HCPCS | Performed by: PHYSICIAN ASSISTANT

## 2023-10-06 PROCEDURE — 82043 UR ALBUMIN QUANTITATIVE: CPT | Performed by: PHYSICIAN ASSISTANT

## 2023-10-06 PROCEDURE — 36415 COLL VENOUS BLD VENIPUNCTURE: CPT | Performed by: PHYSICIAN ASSISTANT

## 2023-10-06 RX ORDER — BLOOD SUGAR DIAGNOSTIC
1 STRIP MISCELLANEOUS DAILY
COMMUNITY

## 2023-10-06 NOTE — PROGRESS NOTES
Assessment & Plan     Diabetic polyneuropathy associated with type 2 diabetes mellitus (H)    Patient is currently treating with 2000 mg of metformin daily. He has recently made diet changes after being in assisted living where they did not follow-up low carb diet. He wishes to not make any changes to treatment today but has a goal A1c of 6.0% per him and vascular surgery. Follow-up in 3 months with a primary care provider. Patient does not want to take statins due to bad side effects but will take some natural remedies.    - Albumin Random Urine Quantitative with Creat Ratio; Future  - Lipid panel reflex to direct LDL Non-fasting; Future  - HEMOGLOBIN A1C; Future  - FOOT EXAM  - Albumin Random Urine Quantitative with Creat Ratio  - Lipid panel reflex to direct LDL Non-fasting  - HEMOGLOBIN A1C                     Hipolito Xie PA-C  [facility]    Dino Multani is a 73 year old, presenting for the following health issues:  Establish Care and Diabetes (Patient moved back here from [place] about a year ago. )      10/6/2023    10:46 AM   Additional Questions   Roomed by Tammy   Accompanied by self         10/6/2023    10:46 AM   Patient Reported Additional Medications   Patient reports taking the following new medications no       History of Present Illness       Reason for visit:  [facility]    He eats 2-3 servings of fruits and vegetables daily.He consumes 0 sweetened beverage(s) daily.He exercises with enough effort to increase his heart rate 30 to 60 minutes per day.  He exercises with enough effort to increase his heart rate 7 days per week.   He is taking medications regularly.         Diabetes Follow-up    How often are you checking your blood sugar? One time daily  What time of day are you checking your blood sugars (select all that apply)?  Before meals  Have you had any blood sugars above 200?  Yes when he was in the hospital. Yesterday it was 154  Have you had any blood  "sugars below 70?  Yes when he was in the hospital  What symptoms do you notice when your blood sugar is low?  None and Not applicable  What concerns do you have today about your diabetes? None   Do you have any of these symptoms? (Select all that apply)  No numbness or tingling in feet.  No redness, sores or blisters on feet.  No complaints of excessive thirst.  No reports of blurry vision.  No significant changes to weight.  Have you had a diabetic eye exam in the last 12 months? No    Watches diet very carefully to avoid excess carbs and sugars. Eats lots of protein.       BP Readings from Last 2 Encounters:   10/06/23 (!) 162/84   08/17/23 (!) 146/88     Hemoglobin A1C (%)   Date Value   05/17/2023 8.0 (H)   02/08/2023 8.2 (H)   05/20/2010 7.8 @   04/12/2010 10.5 (H)     LDL Cholesterol Calculated (mg/dL)   Date Value   08/07/2021 121 (H)   04/13/2010     Cannot estimate LDL when triglyceride exceeds 400 mg/dL         Review of Systems   Constitutional, HEENT, cardiovascular, pulmonary, gi and gu systems are negative, except as otherwise noted.        Objective    BP (!) 162/84 (BP Location: Right arm, Patient Position: Sitting, Cuff Size: Adult Regular)   Pulse 86   Temp 97.6  F (36.4  C) (Oral)   Ht 1.753 m (5' 9\")   Wt 110.4 kg (243 lb 6.4 oz)   SpO2 99%   BMI 35.94 kg/m    Body mass index is 35.94 kg/m .      Physical Exam   GENERAL: healthy, alert and no distress  EYES: Eyes grossly normal to inspection, PERRL and conjunctivae and sclerae normal  RESP: lungs clear to auscultation - no rales, rhonchi or wheezes  CV: regular rate and rhythm, normal S1 S2, no S3 or S4, no murmur, click or rub, no peripheral edema and peripheral pulses strong  MS: no gross musculoskeletal defects noted, no edema  SKIN: no suspicious lesions or rashes  NEURO: Normal strength and tone, mentation intact and speech normal  PSYCH: mentation appears normal, affect normal/bright  Diabetic foot exam: LEFT FOOT: normal DP and PT " pulses, no trophic changes or ulcerative lesions, normal sensory exam, and normal monofilament exam  RIGHT FOOT: amputated

## 2024-05-30 DIAGNOSIS — E11.42 DIABETIC POLYNEUROPATHY ASSOCIATED WITH TYPE 2 DIABETES MELLITUS (H): Primary | ICD-10-CM

## 2024-05-30 NOTE — TELEPHONE ENCOUNTER
Medication Question or Refill    Contacts         Type Contact Phone/Fax    05/30/2024 02:35 PM CDT Phone (Incoming) Rangel Yang (Self) [phone] (H)            What medication are you calling about (include dose and sig)?: Metformin    Preferred Pharmacy:     [facility] [place], [place] - [address]  [address]  [facility] [address]  Phone: [phone] Fax: [phone]      Controlled Substance Agreement on file:   CSA -- Patient Level:    CSA: None found at the patient level.       Who prescribed the medication?: Hipolito Xie    Do you need a refill? Yes    When did you use the medication last? daily    Patient offered an appointment? Yes: Declined    Do you have any questions or concerns?  No      Okay to leave a detailed message?: Yes at Cell number on file:    No relevant phone numbers on file.

## 2024-05-30 NOTE — TELEPHONE ENCOUNTER
Please call patient. Sending 1 time refill but overdue for visit with primary care provider. Please help schedule.    Hipolito Xie PA-C on 5/30/2024 at 3:08 PM

## 2024-09-26 ENCOUNTER — OFFICE VISIT (OUTPATIENT)
Dept: INTERNAL MEDICINE | Facility: CLINIC | Age: 74
End: 2024-09-26
Payer: MEDICARE

## 2024-09-26 VITALS
SYSTOLIC BLOOD PRESSURE: 139 MMHG | RESPIRATION RATE: 20 BRPM | TEMPERATURE: 97.3 F | HEART RATE: 73 BPM | DIASTOLIC BLOOD PRESSURE: 80 MMHG | OXYGEN SATURATION: 95 %

## 2024-09-26 DIAGNOSIS — Z23 NEED FOR COVID-19 VACCINE: ICD-10-CM

## 2024-09-26 DIAGNOSIS — Z23 NEED FOR INFLUENZA VACCINATION: ICD-10-CM

## 2024-09-26 DIAGNOSIS — I73.9 PERIPHERAL ARTERIAL DISEASE (H): ICD-10-CM

## 2024-09-26 DIAGNOSIS — R01.1 HEART MURMUR: ICD-10-CM

## 2024-09-26 DIAGNOSIS — E11.42 DIABETIC POLYNEUROPATHY ASSOCIATED WITH TYPE 2 DIABETES MELLITUS (H): Primary | ICD-10-CM

## 2024-09-26 DIAGNOSIS — Z89.511 S/P BKA (BELOW KNEE AMPUTATION), RIGHT (H): ICD-10-CM

## 2024-09-26 PROBLEM — I70.223 CRITICAL LIMB ISCHEMIA OF BOTH LOWER EXTREMITIES (H): Status: ACTIVE | Noted: 2024-09-26

## 2024-09-26 PROBLEM — M86.9 OSTEOMYELITIS (H): Status: RESOLVED | Noted: 2021-08-05 | Resolved: 2024-09-26

## 2024-09-26 LAB
ANION GAP SERPL CALCULATED.3IONS-SCNC: 12 MMOL/L (ref 7–15)
BUN SERPL-MCNC: 20.9 MG/DL (ref 8–23)
CALCIUM SERPL-MCNC: 9.4 MG/DL (ref 8.8–10.4)
CHLORIDE SERPL-SCNC: 98 MMOL/L (ref 98–107)
CHOLEST SERPL-MCNC: 264 MG/DL
CREAT SERPL-MCNC: 1.03 MG/DL (ref 0.67–1.17)
EGFRCR SERPLBLD CKD-EPI 2021: 77 ML/MIN/1.73M2
EST. AVERAGE GLUCOSE BLD GHB EST-MCNC: 217 MG/DL
FASTING STATUS PATIENT QL REPORTED: YES
FASTING STATUS PATIENT QL REPORTED: YES
GLUCOSE SERPL-MCNC: 250 MG/DL (ref 70–99)
HBA1C MFR BLD: 9.2 % (ref 0–5.6)
HCO3 SERPL-SCNC: 27 MMOL/L (ref 22–29)
HDLC SERPL-MCNC: 30 MG/DL
LDLC SERPL CALC-MCNC: 160 MG/DL
NONHDLC SERPL-MCNC: 234 MG/DL
POTASSIUM SERPL-SCNC: 4.7 MMOL/L (ref 3.4–5.3)
SODIUM SERPL-SCNC: 137 MMOL/L (ref 135–145)
TRIGL SERPL-MCNC: 372 MG/DL

## 2024-09-26 PROCEDURE — 91320 SARSCV2 VAC 30MCG TRS-SUC IM: CPT

## 2024-09-26 PROCEDURE — 80061 LIPID PANEL: CPT

## 2024-09-26 PROCEDURE — 90480 ADMN SARSCOV2 VAC 1/ONLY CMP: CPT

## 2024-09-26 PROCEDURE — 90662 IIV NO PRSV INCREASED AG IM: CPT

## 2024-09-26 PROCEDURE — 99214 OFFICE O/P EST MOD 30 MIN: CPT | Mod: 25

## 2024-09-26 PROCEDURE — G0008 ADMIN INFLUENZA VIRUS VAC: HCPCS

## 2024-09-26 PROCEDURE — 80048 BASIC METABOLIC PNL TOTAL CA: CPT

## 2024-09-26 PROCEDURE — 83036 HEMOGLOBIN GLYCOSYLATED A1C: CPT

## 2024-09-26 PROCEDURE — 36415 COLL VENOUS BLD VENIPUNCTURE: CPT

## 2024-09-26 NOTE — PROGRESS NOTES
"  Assessment & Plan     (E11.42) Diabetic polyneuropathy associated with type 2 diabetes mellitus (H)  (primary encounter diagnosis)  Comment:   He is s/p right below the knee amputation in 2022 after he developed osteomyelitis from a RLE wound that he sustained while skiing in [place].    He has history of PAD, T2DM, hyperlipidemia, HTN. We discsused that vascular has recommend his A1C goal be 6.0% or less. I also advised patient that he be seen a minimum of every 6 months with diabetes.  His LDL is 138. He is not interested in starting a statin as he tells me these have bad side effects.  His BP today is 139/80. We discussed I would like his BP to be <130/80. He was on Lisinopril in the past but stopped this because he didn't feel well on this medication. Rangel is not interested in starting any new BP medication today.     Plan: HEMOGLOBIN A1C, BASIC METABOLIC PANEL, Lipid         panel reflex to direct LDL Fasting, Albumin         Random Urine Quantitative with Creat Ratio,         metFORMIN (GLUCOPHAGE) 500 MG tablet          (Z23) Need for influenza vaccination  Comment:   Plan: INFLUENZA HIGH DOSE, TRIVALENT, PF (FLUZONE)            (Z23) Need for COVID-19 vaccine  Comment:   Plan: COVID-19 12+ (PFIZER)            (Z89.511) S/P BKA (below knee amputation), right (H)  (I73.9) Peripheral arterial disease (H24)  Comment: see details above    (R01.1) Heart murmur  Comment:   Plan: Echocardiogram Complete            The longitudinal plan of care for the diagnosis(es)/condition(s) as documented were addressed during this visit. Due to the added complexity in care, I will continue to support Rangel in the subsequent management and with ongoing continuity of care.    BMI  Estimated body mass index is 35.94 kg/m  as calculated from the following:    Height as of 10/6/23: 1.753 m (5' 9\").    Weight as of 10/6/23: 110.4 kg (243 lb 6.4 oz).   Weight management plan: Discussed healthy diet and exercise " guidelines                Dino Multani is a 74 year old, presenting for the following health issues:  [facility]        9/26/2024     8:41 AM   Additional Questions   Roomed by Kalani     History of Present Illness       Reason for visit:  Changing primary He is missing 7 dose(s) of medications per week.             Objective    /80   Pulse 73   Temp 97.3  F (36.3  C) (Tympanic)   Resp 20   SpO2 95%   There is no height or weight on file to calculate BMI.  Physical Exam  Constitutional:       General: He is not in acute distress.     Appearance: Normal appearance. He is not ill-appearing, toxic-appearing or diaphoretic.   HENT:      Head: Normocephalic and atraumatic.   Eyes:      Conjunctiva/sclera: Conjunctivae normal.   Cardiovascular:      Rate and Rhythm: Normal rate and regular rhythm.      Pulses: Normal pulses.      Heart sounds: Murmur heard.   Pulmonary:      Effort: Pulmonary effort is normal.      Breath sounds: Normal breath sounds.   Musculoskeletal:      Right Lower Extremity: Right leg is amputated below knee.   Skin:     General: Skin is warm and dry.   Neurological:      Mental Status: He is alert and oriented to person, place, and time.   Psychiatric:         Mood and Affect: Mood normal.         Behavior: Behavior normal.         Thought Content: Thought content normal.         Judgment: Judgment normal.                  Signed Electronically by: KAREEM Raymond CNP

## 2024-10-01 ENCOUNTER — TRANSFERRED RECORDS (OUTPATIENT)
Dept: MULTI SPECIALTY CLINIC | Facility: CLINIC | Age: 74
End: 2024-10-01

## 2024-10-01 LAB — RETINOPATHY: NORMAL

## 2024-10-07 ENCOUNTER — VIRTUAL VISIT (OUTPATIENT)
Dept: INTERNAL MEDICINE | Facility: CLINIC | Age: 74
End: 2024-10-07
Payer: MEDICARE

## 2024-10-07 DIAGNOSIS — E78.5 HYPERLIPIDEMIA LDL GOAL <70: ICD-10-CM

## 2024-10-07 DIAGNOSIS — E11.42 DIABETIC POLYNEUROPATHY ASSOCIATED WITH TYPE 2 DIABETES MELLITUS (H): Primary | ICD-10-CM

## 2024-10-07 PROCEDURE — G2211 COMPLEX E/M VISIT ADD ON: HCPCS | Mod: 95

## 2024-10-07 PROCEDURE — 99214 OFFICE O/P EST MOD 30 MIN: CPT | Mod: 95

## 2024-10-07 RX ORDER — GLIPIZIDE 5 MG/1
5 TABLET, FILM COATED, EXTENDED RELEASE ORAL DAILY
Qty: 90 TABLET | Refills: 0 | Status: SHIPPED | OUTPATIENT
Start: 2024-10-07

## 2024-10-07 RX ORDER — ROSUVASTATIN CALCIUM 20 MG/1
20 TABLET, COATED ORAL DAILY
Qty: 90 TABLET | Refills: 0 | Status: SHIPPED | OUTPATIENT
Start: 2024-10-07

## 2024-10-07 NOTE — PROGRESS NOTES
Rangel is a 74 year old who is being evaluated via a billable video visit.    How would you like to obtain your AVS? MyChart  If the video visit is dropped, the invitation should be resent by: Text to cell phone: [phone]  Will anyone else be joining your video visit? No      Assessment & Plan     (E11.42) Diabetic polyneuropathy associated with type 2 diabetes mellitus (H)  (primary encounter diagnosis)  Comment: Diabetes uncontrolled with A1C of 9.2%. He is on Metformin 1,000MG BID.   He is trying to follow a healthy diet and trying to exercise daily.   He was on Glipizide in the past and is not sure why this was discontinued.  I would like him to restart the Glipizide XL 5MG. He will see me in 3 months with labs prior.     His LDL is elevated at 160.   He has tried Atorvastatin and Simvastatin did not tolerate this due to diarrhea. Rangel is agreeable trying Rosuvastatin. If he does not tolerate this, we could consider having him seen by cardiology.  I also recommended he start using fish oil to help lower his TG levels. He should start with 1G BID.   Plan: blood glucose monitoring (NO BRAND SPECIFIED)         meter device kit, blood glucose (NO BRAND         SPECIFIED) test strip, glipiZIDE (GLUCOTROL XL)        5 MG 24 hr tablet, Hemoglobin A1c, Albumin         Random Urine Quantitative with Creat Ratio            The longitudinal plan of care for the diagnosis(es)/condition(s) as documented were addressed during this visit. Due to the added complexity in care, I will continue to support Rangel in the subsequent management and with ongoing continuity of care.    Subjective   Rangel is a 74 year old, presenting for the following health issues:  Results      10/7/2024    11:56 AM   Additional Questions   Roomed by Kalani     Video Start Time: 12:45 PM    History of Present Illness       Reason for visit:  Changing primary He is missing 7 dose(s) of medications per week.                   Objective            Vitals:  No vitals were obtained today due to virtual visit.    Physical Exam   GENERAL: alert and no distress  EYES: Eyes grossly normal to inspection.  No discharge or erythema, or obvious scleral/conjunctival abnormalities.  RESP: No audible wheeze, cough, or visible cyanosis.    SKIN: Visible skin clear. No significant rash, abnormal pigmentation or lesions.  NEURO: Cranial nerves grossly intact.  Mentation and speech appropriate for age.  PSYCH: Appropriate affect, tone, and pace of words          Video-Visit Details    Type of service:  Video Visit   Video End Time:1:09 PM  Originating Location (pt. Location): Home    Distant Location (provider location):  On-site  Platform used for Video Visit: Christelle  Signed Electronically by: KAREEM Raymond CNP

## 2024-10-08 ENCOUNTER — MEDICAL CORRESPONDENCE (OUTPATIENT)
Dept: HEALTH INFORMATION MANAGEMENT | Facility: CLINIC | Age: 74
End: 2024-10-08

## 2024-10-08 ENCOUNTER — TELEPHONE (OUTPATIENT)
Dept: INTERNAL MEDICINE | Facility: CLINIC | Age: 74
End: 2024-10-08
Payer: MEDICARE

## 2024-10-08 NOTE — TELEPHONE ENCOUNTER
Medical necessity form for high utilization received via fax. Form in your mailbox to be signed.

## 2024-10-22 ENCOUNTER — HOSPITAL ENCOUNTER (OUTPATIENT)
Dept: CARDIOLOGY | Facility: CLINIC | Age: 74
Discharge: HOME | End: 2024-10-22
Payer: MEDICARE

## 2024-10-22 DIAGNOSIS — R01.1 HEART MURMUR: ICD-10-CM

## 2024-10-22 LAB — LVEF ECHO: NORMAL

## 2024-10-22 PROCEDURE — 255N000002 HC RX 255 OP 636

## 2024-10-22 PROCEDURE — 999N000208 ECHOCARDIOGRAM COMPLETE

## 2024-10-22 PROCEDURE — 93306 TTE W/DOPPLER COMPLETE: CPT | Mod: 26 | Performed by: INTERNAL MEDICINE

## 2024-10-22 RX ADMIN — HUMAN ALBUMIN MICROSPHERES AND PERFLUTREN 3 ML: 10; .22 INJECTION, SOLUTION INTRAVENOUS at 14:03

## 2024-10-31 ENCOUNTER — HOSPITAL ENCOUNTER (OUTPATIENT)
Dept: ULTRASOUND IMAGING | Facility: CLINIC | Age: 74
Discharge: HOME | End: 2024-10-31
Attending: SURGERY | Admitting: SURGERY
Payer: MEDICARE

## 2024-10-31 ENCOUNTER — OFFICE VISIT (OUTPATIENT)
Dept: SURGERY | Facility: CLINIC | Age: 74
End: 2024-10-31
Attending: SURGERY
Payer: MEDICARE

## 2024-10-31 VITALS
HEART RATE: 86 BPM | DIASTOLIC BLOOD PRESSURE: 76 MMHG | SYSTOLIC BLOOD PRESSURE: 116 MMHG | BODY MASS INDEX: 35.99 KG/M2 | OXYGEN SATURATION: 95 % | WEIGHT: 243 LBS | HEIGHT: 69 IN

## 2024-10-31 DIAGNOSIS — I73.9 PAD (PERIPHERAL ARTERY DISEASE) (H): ICD-10-CM

## 2024-10-31 DIAGNOSIS — E66.812 CLASS 2 SEVERE OBESITY DUE TO EXCESS CALORIES WITH SERIOUS COMORBIDITY AND BODY MASS INDEX (BMI) OF 35.0 TO 35.9 IN ADULT (H): ICD-10-CM

## 2024-10-31 DIAGNOSIS — I70.223 CRITICAL LIMB ISCHEMIA OF BOTH LOWER EXTREMITIES (H): Primary | ICD-10-CM

## 2024-10-31 DIAGNOSIS — E66.01 CLASS 2 SEVERE OBESITY DUE TO EXCESS CALORIES WITH SERIOUS COMORBIDITY AND BODY MASS INDEX (BMI) OF 35.0 TO 35.9 IN ADULT (H): ICD-10-CM

## 2024-10-31 PROCEDURE — 93922 UPR/L XTREMITY ART 2 LEVELS: CPT

## 2024-10-31 PROCEDURE — 99213 OFFICE O/P EST LOW 20 MIN: CPT | Performed by: SURGERY

## 2024-10-31 NOTE — PROGRESS NOTES
Luisito Yang is a 74-year-old male with a history of peripheral arterial disease.  See my note from August 2023 for the details of his peripheral arterial disease history.    He is still living in assisted living and tells me that his sugar has been poorly controlled because his metformin dose was halved by his caretakers at the assisted living.  Nevertheless he is still quite active and can put on his prosthesis and vacuum at home.    Last hemoglobin A1c was 9.2%.  Ankle-brachial index today is 0.56 with a digital brachial index of 0.25 (toe pressure 42 mmHg.    I suspect that the intervention that was done on his left lower extremity has gone on to require an distal superficial femoral artery and most likely occluded.    Since he does not have rest pain or open wounds there is no need for arterial intervention.    However, I have explained this to him in no uncertain words that if he develops an infection or nonhealing wound in the left lower extremity then he should come to the emergency department at [facility] and not wait for an outpatient appointment with vascular surgery.    He has verbalizes understanding.    I will see him back in a year with repeat ankle-brachial indexes of the left lower extremity.  However, my feeling is that we will end up meeting him before the 1 year is up.

## 2024-11-04 ENCOUNTER — PATIENT OUTREACH (OUTPATIENT)
Dept: INTERNAL MEDICINE | Facility: CLINIC | Age: 74
End: 2024-11-04
Payer: MEDICARE

## 2024-11-04 NOTE — PATIENT INSTRUCTIONS
Thank you for allowing us to provide your medical care.  Please see below for the follow up instructions pertaining to your medical appointment with Dr. Champion.    Follow up plan: visit with Dr. Champion and MIYA US w/ toe pressures in one year.    Our office will send you a letter by mail, closer to your follow up time frame, with a reminded to call to schedule appointment(s).  Please call our office with any concerns or questions [phone].

## 2024-11-04 NOTE — TELEPHONE ENCOUNTER
Patient Quality Outreach    Patient is due for the following:   Diabetes -  Eye Exam and Foot Exam  Colon Cancer Screening  Physical Annual Wellness Visit      Topic Date Due    Zoster (Shingles) Vaccine (2 of 3) 06/27/2016       Next Steps:   Schedule a Adult Preventative & as above when due for follow up appointment 1-7-2025.  Please abstract last eye exam info.    Type of outreach:    Phone, spoke to patient/parent. Agrees to schedule. Last eye exam 10-1-2024 [facility].      Questions for provider review:    None           Vanessa Chung LPN  Chart routed to none.

## 2024-11-19 ENCOUNTER — TELEPHONE (OUTPATIENT)
Dept: OTHER | Facility: CLINIC | Age: 74
End: 2024-11-19
Payer: MEDICARE

## 2024-11-19 NOTE — LETTER
[facility]  [address]  [place] [address]  Phone: [phone]  Fax: [phone]      November 19, 2024      Rangel Yang  [address]    [place] [address]      To whom this may concern,      Rangel will need a prosthetic as he has a right below the knee amputation.        Sincerely,          Tomas Champion MD    [facility] [place]  [address]  [facility] [address]  Phone: [phone]  Fax: [phone]

## 2024-11-19 NOTE — TELEPHONE ENCOUNTER
10/31/2024 LOV with Dr. Champion  Hx PAD, R BKA    Patient needs a letter stating he needs his prosthetic for his RLE as he has a R BKA.   Patient reports [facility] will be faxing the letter to his insurance and he will be picking up his prosthetic this Friday.      Writer will start letter per patient request.  Dr. Champion signed.  Faxed to [facility].

## 2024-11-19 NOTE — TELEPHONE ENCOUNTER
[facility]    Who is the name of the provider? Dr Champion    What is the location you see this provider at/preferred location? [place]    Person calling / Facility: Rangel    Phone number: [phone]    Nurse call back needed:     Reason for call: Pt has an appointment to get orthotics on Friday 11/22/24. Pt needs a letter faxed over to them before they will give him the orthotics.  Please send a letter to:   Fax # [phone]      Pharmacy location: N/A    Outside Imaging: N/A    Can we leave a detailed message on this number? Y    Additional Info: Patient will call back if the fax number is incorrect

## 2024-12-09 ENCOUNTER — TELEPHONE (OUTPATIENT)
Dept: INTERNAL MEDICINE | Facility: CLINIC | Age: 74
End: 2024-12-09
Payer: MEDICARE

## 2024-12-09 NOTE — TELEPHONE ENCOUNTER
General Call      Reason for Call:  Some form they fill out -  people were in touch with Krzysztof to get this signed.  Stated that he needed to come in for another appt to get this.  If he has to wait until Jan. He wont get shoes for this year.  She did see him in Sept and vascular surgeon in 10/31/24 - He should be all updated so why does he have to be seen.  He would have to make an appt in Dec. And has so many appts.  He also would have to find a ride.    What are your questions or concerns:  This form needs to be signed so he can get his orthotics    Date of last appointment with provider: 8/24    Okay to leave a detailed message?: Yes at Cell number on file:    Telephone Information:   Mobile [phone]     Melodie SHAH    Vascular Surgeon - #[phone] - if she wants to call there.    Fax #[phone]

## 2024-12-10 NOTE — TELEPHONE ENCOUNTER
Left message to call back there is a same day approval slot that can be used for patient /per Bianka   On 12/26/24@ 2:10 arrival time   please explain to patient that insurance dictates that the provider that signs the form must do a foot exam other wise form can not be filled out.     ANMOL Hutton LPN

## 2024-12-10 NOTE — TELEPHONE ENCOUNTER
"Pt calls back, writer advised below recommendation.   Pt states he never this issue in the past and states \"just forget it\" I just won't get a shoe. He also requests writer cancel 1/7/24 appointment. Appointment cancelled per pt request. Routing to PCP NEGAR.    Josiah Kelley RN [facility]    "

## 2025-01-06 ENCOUNTER — MEDICAL CORRESPONDENCE (OUTPATIENT)
Dept: HEALTH INFORMATION MANAGEMENT | Facility: CLINIC | Age: 75
End: 2025-01-06
Payer: MEDICARE

## 2025-01-12 DIAGNOSIS — E78.5 HYPERLIPIDEMIA LDL GOAL <70: ICD-10-CM

## 2025-01-12 DIAGNOSIS — E11.42 DIABETIC POLYNEUROPATHY ASSOCIATED WITH TYPE 2 DIABETES MELLITUS (H): ICD-10-CM

## 2025-01-13 RX ORDER — GLIPIZIDE 5 MG/1
5 TABLET, FILM COATED, EXTENDED RELEASE ORAL DAILY
Qty: 90 TABLET | Refills: 0 | Status: SHIPPED | OUTPATIENT
Start: 2025-01-13

## 2025-01-13 RX ORDER — ROSUVASTATIN CALCIUM 20 MG/1
20 TABLET, COATED ORAL DAILY
Qty: 90 TABLET | Refills: 2 | Status: SHIPPED | OUTPATIENT
Start: 2025-01-13

## 2025-04-11 ENCOUNTER — TELEPHONE (OUTPATIENT)
Dept: INTERNAL MEDICINE | Facility: CLINIC | Age: 75
End: 2025-04-11
Payer: MEDICARE

## 2025-04-11 DIAGNOSIS — E11.42 DIABETIC POLYNEUROPATHY ASSOCIATED WITH TYPE 2 DIABETES MELLITUS (H): ICD-10-CM

## 2025-04-11 DIAGNOSIS — E11.42 DIABETIC POLYNEUROPATHY ASSOCIATED WITH TYPE 2 DIABETES MELLITUS (H): Primary | ICD-10-CM

## 2025-04-11 NOTE — TELEPHONE ENCOUNTER
Pt calling has been having diarrhea for 2 weeks.   He is thinking this is from metformin     He is wanting a time release RX for this to see if it helps his diarrhea.       Pt has been taking immodium and working on diet daily     He is very rude on the phone and states he will not be able to come in for to clinic as he is having diarrhea and just wants change in RX.     Pt is upset and rude.   On phone does not want to talk to RN     Laura Esposito RN

## 2025-04-14 RX ORDER — METFORMIN HYDROCHLORIDE 500 MG/1
1000 TABLET, EXTENDED RELEASE ORAL
Qty: 60 TABLET | Refills: 0 | Status: SHIPPED | OUTPATIENT
Start: 2025-04-14 | End: 2025-05-14

## 2025-04-14 NOTE — TELEPHONE ENCOUNTER
Patient calls back as he hasn't received a response.  He has had 2 week history of loose stools, approximately 6 per day.  Denies abdominal pain, fever, bloody or black stools.   Patient wondering if Metformin is causing the diarrhea though this is not a new medication to him. He is asking if the extended release version could be tried to see if the diarrhea is reduced.  New medications are Glipizide and Rosuvastatin.  No openings in clinic today.    Has been using Imodium, asking if he should be using something else like Pepto Bismol.  MARLINE Lopez R.N.

## 2025-04-15 RX ORDER — GLIPIZIDE 5 MG/1
5 TABLET, FILM COATED, EXTENDED RELEASE ORAL DAILY
Qty: 90 TABLET | Refills: 0 | Status: SHIPPED | OUTPATIENT
Start: 2025-04-15

## 2025-04-15 NOTE — TELEPHONE ENCOUNTER
There appears to be an encounter asking for Metformin refill and one complaining of diarrhea, suspects Metformin is causing.  Two different providers answered and now [facility] pharmacist calls back to ask which one they should fill.    Call Aleksander at [facility] [phone] with response. MARLINE Lopez R.N.

## 2025-04-16 NOTE — TELEPHONE ENCOUNTER
Patient calls back to ask if it's OK to just take the Metformin XR just once daily. Writer advised OK, since it's XR the medication is 24 hours.     Summer RN 11:17 AM April 16, 2025   [facility]

## 2025-05-13 DIAGNOSIS — E11.42 DIABETIC POLYNEUROPATHY ASSOCIATED WITH TYPE 2 DIABETES MELLITUS (H): ICD-10-CM

## 2025-05-13 NOTE — TELEPHONE ENCOUNTER
Please call patient to verify dose. Is she taking 1000mg twice daily or 1000mg once daily? Then route refill to primary care provider.

## 2025-05-13 NOTE — TELEPHONE ENCOUNTER
Patient switched over to XR version after having the runs using the non timed release. He is wanting the XR sent to pharmacy again. Routing to PCP    Summer RN 2:52 PM May 13, 2025   [facility]

## 2025-05-13 NOTE — TELEPHONE ENCOUNTER
Please call patient and pass on my message below:    remind him he is overdue for diabetes labs.   His A1c was quite high at last visit and he was supposed to see me 4-5 months ago. He has received many extra asmita refills.  If he does not attend visit next month I will not be able to provide ongoing refills.    I require all patients with uncontrolled diabetes to be seen every 3 months until A1C is at goal.  If this is not something he is agreeable to doing, he is welcome to establish with a provider that best fits his needs. I tried to be clear about my expectations in the past regarding how often he would need to be seen by me with his health conditions.    Also, is he taking Metformin 1,000MG BID or just 1,000MG once daily? It looks like in the past he was on 1,000 MG BID so I am not sure when it was changed to just 1,000MG once daily

## 2025-05-14 NOTE — TELEPHONE ENCOUNTER
Pt is calling back.    Advised of PCPs message below.     Pt reports he takes 1000mg once daily at night  Twice daily gave him diarrhea     Also reports he needed to cancel appts in the past, writer reiterated needs to be seen at future OV with PCP or no future refills will be given, does have option to switch to another provider if he wants.    Please advise refill

## 2025-05-15 RX ORDER — METFORMIN HYDROCHLORIDE 500 MG/1
1000 TABLET, EXTENDED RELEASE ORAL
Qty: 60 TABLET | Refills: 0 | Status: SHIPPED | OUTPATIENT
Start: 2025-05-15

## 2025-05-28 ENCOUNTER — PATIENT OUTREACH (OUTPATIENT)
Dept: INTERNAL MEDICINE | Facility: CLINIC | Age: 75
End: 2025-05-28
Payer: MEDICARE

## 2025-05-28 PROBLEM — D64.9 NORMOCYTIC ANEMIA: Status: ACTIVE | Noted: 2022-06-23

## 2025-05-28 PROBLEM — I10 HYPERTENSION: Chronic | Status: ACTIVE | Noted: 2022-05-25

## 2025-05-28 PROBLEM — I73.9 PERIPHERAL ARTERIAL DISEASE: Status: ACTIVE | Noted: 2021-01-01

## 2025-05-28 PROBLEM — E11.610 DIABETIC CHARCOT FOOT (H): Status: ACTIVE | Noted: 2022-06-23

## 2025-05-28 PROBLEM — E11.65 TYPE 2 DIABETES MELLITUS WITH HYPERGLYCEMIA, WITHOUT LONG-TERM CURRENT USE OF INSULIN (H): Chronic | Status: ACTIVE | Noted: 2019-09-07

## 2025-05-28 PROBLEM — N17.9 ACUTE KIDNEY INJURY: Status: ACTIVE | Noted: 2022-05-25

## 2025-05-28 PROBLEM — L03.119 CELLULITIS AND ABSCESS OF FOOT: Status: ACTIVE | Noted: 2022-05-26

## 2025-05-28 PROBLEM — S92.354A NONDISPLACED FRACTURE OF FIFTH METATARSAL BONE, RIGHT FOOT, INITIAL ENCOUNTER FOR CLOSED FRACTURE: Status: ACTIVE | Noted: 2022-05-25

## 2025-05-28 PROBLEM — E11.621 DIABETIC ULCER OF RIGHT MIDFOOT ASSOCIATED WITH TYPE 2 DIABETES MELLITUS, WITH BONE INVOLVEMENT WITHOUT EVIDENCE OF NECROSIS (H): Status: ACTIVE | Noted: 2022-05-26

## 2025-05-28 PROBLEM — E87.1 HYPONATREMIA: Status: ACTIVE | Noted: 2022-05-25

## 2025-05-28 PROBLEM — L02.619 CELLULITIS AND ABSCESS OF FOOT: Status: ACTIVE | Noted: 2022-05-26

## 2025-05-28 PROBLEM — M86.9 OSTEOMYELITIS OF RIGHT FOOT (H): Status: ACTIVE | Noted: 2022-05-25

## 2025-05-28 PROBLEM — D72.829 LEUKOCYTOSIS: Status: ACTIVE | Noted: 2022-05-25

## 2025-05-28 PROBLEM — Z96.1 PSEUDOPHAKIA, LEFT EYE: Status: ACTIVE | Noted: 2017-01-19

## 2025-05-28 PROBLEM — R79.89 ELEVATED LIVER FUNCTION TESTS: Status: ACTIVE | Noted: 2022-05-25

## 2025-05-28 PROBLEM — E87.20 METABOLIC ACIDOSIS: Status: ACTIVE | Noted: 2022-05-25

## 2025-05-28 PROBLEM — L97.416 DIABETIC ULCER OF RIGHT MIDFOOT ASSOCIATED WITH TYPE 2 DIABETES MELLITUS, WITH BONE INVOLVEMENT WITHOUT EVIDENCE OF NECROSIS (H): Status: ACTIVE | Noted: 2022-05-26

## 2025-05-28 NOTE — TELEPHONE ENCOUNTER
Patient Quality Outreach    Patient is due for the following:   Diabetes -  A1C, Microalbumin, and Foot Exam  Colon Cancer Screening  Depression  -  PHQ-A needed  Physical Annual Wellness Visit      Topic Date Due    Zoster (Shingles) Vaccine (2 of 3) 06/27/2016    COVID-19 Vaccine (6 - 2024-25 season) 03/26/2025       Action(s) Taken:   Schedule a Annual Wellness Visit    Type of outreach:    Sent letter.    Questions for provider review:    None         Kimberli L Rule, LPN  Chart routed to self.

## 2025-05-28 NOTE — LETTER
May 28, 2025    Rangel Yang    [address]    [place] [address]    Hello,     Your care team at [facility] values your health and well-being. After reviewing your chart, we have identified recommendation(s) to help you better manage your health.    It's time for your Medicare AWV. During your visit, we'll discuss your health, well-being, and any questions you may have related to preventive care. We'll also review any recommended tests, exams, or screenings you might need. To schedule please call your clinic [phone] or use your [employer] account.    If you recently had or are having any of these services soon, please contact the clinic via phone or [employer] so that your care team can update your records.    We look forward to seeing you at your upcoming visit.    If you have any questions or concerns, please contact our clinic. Thank you for continuing to trust us with your care.    Sincerely,    Your [facility] Care Team           Electronically signed

## (undated) DEVICE — LINEN TOWEL PACK X5 5464

## (undated) DEVICE — CAST PADDING 4" COTTON WEBRIL UNSTERILE 9084

## (undated) DEVICE — PREP SKIN SCRUB TRAY 4461A

## (undated) DEVICE — DRSG GAUZE 4X4" 3033

## (undated) DEVICE — PACK EXTREMITY SOP15EXFSD

## (undated) DEVICE — SUCTION CANISTER MEDIVAC LINER 3000ML W/LID 65651-530

## (undated) DEVICE — DRSG KERLIX 4 1/2"X4YDS ROLL 6715

## (undated) DEVICE — DRSG KERLIX FLUFFS X5

## (undated) DEVICE — SOL WATER IRRIG 1000ML BOTTLE 2F7114

## (undated) DEVICE — BONE CEMENT MIXEVAC III HI VAC KIT  0206-015-000

## (undated) DEVICE — BNDG ELASTIC 4"X5YDS UNSTERILE 6611-40

## (undated) DEVICE — GLOVE PROTEXIS BLUE W/NEU-THERA 6.5  2D73EB65

## (undated) DEVICE — ESU PENCIL W/HOLSTER E2350H

## (undated) DEVICE — SOL NACL 0.9% IRRIG 1000ML BOTTLE 2F7124

## (undated) DEVICE — SU PROLENE 3-0 PS-2 18" 8687H

## (undated) DEVICE — GLOVE PROTEXIS POWDER FREE 6.5 ORTHOPEDIC 2D73ET65

## (undated) DEVICE — DRAPE STERI TOWEL LG 1010

## (undated) DEVICE — BLADE SAW SAGITTAL 25.5X9.5X.4MM FINE LINVATEC 5023-138

## (undated) DEVICE — SYR 10ML FINGER CONTROL W/O NDL 309695

## (undated) DEVICE — SU PROLENE 4-0 FS-2 18' 8683G

## (undated) RX ORDER — FENTANYL CITRATE 50 UG/ML
INJECTION, SOLUTION INTRAMUSCULAR; INTRAVENOUS
Status: DISPENSED
Start: 2021-08-06

## (undated) RX ORDER — PROTAMINE SULFATE 10 MG/ML
INJECTION, SOLUTION INTRAVENOUS
Status: DISPENSED
Start: 2021-08-06

## (undated) RX ORDER — ONDANSETRON 2 MG/ML
INJECTION INTRAMUSCULAR; INTRAVENOUS
Status: DISPENSED
Start: 2021-08-07

## (undated) RX ORDER — FENTANYL CITRATE 50 UG/ML
INJECTION, SOLUTION INTRAMUSCULAR; INTRAVENOUS
Status: DISPENSED
Start: 2021-08-07

## (undated) RX ORDER — LIDOCAINE HYDROCHLORIDE 20 MG/ML
INJECTION, SOLUTION EPIDURAL; INFILTRATION; INTRACAUDAL; PERINEURAL
Status: DISPENSED
Start: 2021-08-07

## (undated) RX ORDER — LIDOCAINE HYDROCHLORIDE 10 MG/ML
INJECTION, SOLUTION INFILTRATION; PERINEURAL
Status: DISPENSED
Start: 2021-08-06

## (undated) RX ORDER — PROPOFOL 10 MG/ML
INJECTION, EMULSION INTRAVENOUS
Status: DISPENSED
Start: 2021-08-07

## (undated) RX ORDER — HEPARIN SODIUM 200 [USP'U]/100ML
INJECTION, SOLUTION INTRAVENOUS
Status: DISPENSED
Start: 2021-08-06

## (undated) RX ORDER — HEPARIN SODIUM 1000 [USP'U]/ML
INJECTION, SOLUTION INTRAVENOUS; SUBCUTANEOUS
Status: DISPENSED
Start: 2021-08-06